# Patient Record
Sex: FEMALE | URBAN - METROPOLITAN AREA
[De-identification: names, ages, dates, MRNs, and addresses within clinical notes are randomized per-mention and may not be internally consistent; named-entity substitution may affect disease eponyms.]

---

## 2019-09-24 NOTE — H&P ADULT - NSICDXPASTSURGICALHX_GEN_ALL_CORE_FT
PAST SURGICAL HISTORY:  H/O carotid endarterectomy R side - Sptember 2018    History of cardiac cath 2013 ([facility])

## 2019-09-24 NOTE — H&P ADULT - NSHPSOCIALHISTORY_GEN_ALL_CORE
denies alcohol use  denies illicit drug use  Former 5-6 cigarette/day x 20 yr smoker. Quit 2 yrs ago.

## 2019-09-24 NOTE — H&P ADULT - NSHPPHYSICALEXAM_GEN_ALL_CORE
V/S 	BP:  168/92   HR: 83   RR: 16	T: 97.4	  O2: 97% RA   	  GEN: NAD  PULM:  CTA B/L  HEENT: No JVD  CARD: RRR, S1 and S2   ABD: +BS, NT, soft/ND	  EXT: No Edema B/L LE  NEURO: A+Ox3, no focal deficit  PSYCH: Mood appropriate   PULSES: 2+ Radial b/l, 2+ Femoral b/l (no bruit b/l), DP 2+ b/l, PT 2+ B/L  ASA III, Mallampati III  EKG NSR @ 78bpm, no ST changes, TWI in III V/S 	BP:  168/92   HR: 83   RR: 16	T: 97.4	  O2: 97% RA   	  GEN: NAD  PULM:  CTA B/L  HEENT: No JVD  CARD: RRR, S1 and S2   ABD: +BS, NT, soft/ND	  EXT: No Edema B/L LE  NEURO: A+Ox3, no focal deficit  PSYCH: Mood appropriate   PULSES: 2+ Radial b/l, 1+ Femoral b/l (no bruit b/l), DP 2+ b/l, PT 1+ B/L  ASA III, Mallampati III  EKG NSR @ 78bpm, no ST changes, TWI in III

## 2019-09-24 NOTE — H&P ADULT - NSICDXFAMILYHX_GEN_ALL_CORE_FT
FAMILY HISTORY:  Family history of early CAD, Brother s/p PCI at age 43  Sister s/p PCI at age 53 + 58

## 2019-09-24 NOTE — H&P ADULT - ASSESSMENT
58 y/o Greek Speaking F former smoker, w/ PMHX HTN, HLD, DM, history of diagnostic cardiac catheterization in 2013, Carotid Artery Disease s/p R Sided Carotid Endarterectomy, w/ (CCS Angina Equivalent Class 3 Sx) and abnormal NST.    Due to patient's risk factors, strong family history, abnormal stress test, and CCS angina class 3 equivalent symptoms, patient was referred for cardiac catheterization with PCI if indicated.      H/H 12/40. Pt denies bleeding, GI bleeding, hematemesis, hematuria, BRBPR or melena.   ASA 325mg and Plavix 600mg pre-cath.  IV NS@ 75cc/hr pre-cath.  Type of Sedation: Moderate  Candidate for Sedation: Yes  Risks & benefits of procedure and alternative therapy have been explained to the patient including but not limited to: allergic reaction, bleeding w/possible need for blood transfusion, infection, renal and vascular compromise, limb damage, arrhythmia, stroke, vessel dissection/perforation, Myocardial infarction, emergent CABG. Informed consent obtained and in chart

## 2019-09-24 NOTE — H&P ADULT - NSHPLABSRESULTS_GEN_ALL_CORE
12.3   7.65  )-----------( 290      ( 25 Sep 2019 11:22 )             40.9       09-25    140  |  100  |  13  ----------------------------<  219<H>  3.8   |  29  |  0.62  K repleted w/ Potassium 20meq PO    Ca    9.4      25 Sep 2019 11:22    TPro  7.3  /  Alb  4.3  /  TBili  0.2  /  DBili  x   /  AST  12  /  ALT  11  /  AlkPhos  57  09-25      PT/INR - ( 25 Sep 2019 11:22 )   PT: 11.1 sec;   INR: 0.98          PTT - ( 25 Sep 2019 11:22 )  PTT:29.3 sec    CARDIAC MARKERS ( 25 Sep 2019 11:22 )  x     / x     / 38 U/L / x     / x

## 2019-09-24 NOTE — H&P ADULT - HISTORY OF PRESENT ILLNESS
58 yo former 5-6 cigarette x 20yrs (quit 2yrs ago) F with strong FH of CAD and PMH of HTN, HLD, DM, history of diagnostic cardiac catheterization @ [facility] in 2013 presented to PCP with complaints of SOB 6 months following R sided carotid endarterectomy. SOB occurs after 1/2 flight of stairs and does not occur at rest. Patient additionally admits to occasional dizziness. Patient denied CP, diaphoresis, LE edema, orthopnea, palpitations, PND, fatigue, N/V or syncope. Patient was referred to Dr. Palomo for further cardiac evaluation. Per MD note, echo showed normal LVEF with no significant valvular abnormality. NST was done on 8/30/19 which showed a medium size completely reversible perfusion defect of moderate intensity involving the mid-apical entire septum (anterior and posterior) and the inferior myocardial walls suggestive of inducible myocardial ischemia in the posterior and LAD coronary circulation distributions.    In light of patient's risk factors, strong family history, abnormal stress test, and CCS anginal equivalent syndromes, patient was referred for cardiac catheterization with PCI if indicated. History obtained from patient's daughter (Robb)  **Patient will bring in list of current medications tomorrow    58 yo former 5-6 cigarette x 20yrs (quit 2yrs ago) F with strong FH of CAD and PMH of HTN, HLD, DM, history of diagnostic cardiac catheterization @ [facility] in 2013 presented to PCP with complaints of SOB 6 months following R sided carotid endarterectomy. SOB occurs after 1/2 flight of stairs and does not occur at rest. Patient additionally admits to occasional dizziness. Patient denied CP, diaphoresis, LE edema, orthopnea, palpitations, PND, fatigue, N/V or syncope. Patient was referred to Dr. Palomo for further cardiac evaluation. Per MD note, echo showed normal LVEF with no significant valvular abnormality. NST was done on 8/30/19 which showed a medium size completely reversible perfusion defect of moderate intensity involving the mid-apical entire septum (anterior and posterior) and the inferior myocardial walls suggestive of inducible myocardial ischemia in the posterior and LAD coronary circulation distributions.    In light of patient's risk factors, strong family history, abnormal stress test, and CCS anginal equivalent syndromes, patient was referred for cardiac catheterization with PCI if indicated. History obtained from patient's daughter (Robb)  **Patient will bring in list of current medications tomorrow    60 yo former 5-6 cigarette x 20yrs (quit 2yrs ago) F with strong FH of CAD and PMH of HTN, HLD, DM, history of diagnostic cardiac catheterization @ [facility] in 2013 presented to PCP with complaints of SOB 6 months following R sided carotid endarterectomy. SOB occurs after 1/2 flight of stairs and does not occur at rest. Patient additionally admits to occasional dizziness. Patient denied CP, diaphoresis, LE edema, orthopnea, palpitations, PND, fatigue, N/V or syncope. Patient was referred to Dr. Palomo for further cardiac evaluation. Per MD note, echo showed normal LVEF with no significant valvular abnormality. NST was done on 8/30/19 which showed a medium size completely reversible perfusion defect of moderate intensity involving the mid-apical entire septum (anterior and posterior) and the inferior myocardial walls suggestive of inducible myocardial ischemia in the posterior and LAD coronary circulation distributions.    In light of patient's risk factors, strong family history, abnormal stress test, and CCS anginal equivalent symptoms, patient was referred for cardiac catheterization with PCI if indicated. History obtained from patient's daughter (Robb)    58 y/o Uzbek Speaking F former 5-6 cigarette x 20yrs (quit 2yrs ago) F with strong FMHX of CAD and PMH of HTN, HLD, DM, history of diagnostic cardiac catheterization @ [facility] in 2013, Carotid Artery Disease s/p R Sided Carotid Endarterectomy, who presents w/ STARKEY after 1/2 flight of stairs and does not occur at rest (CCS Angina Equivalent Class 3 Sx). Patient additionally admits to occasional dizziness. Patient denied CP, diaphoresis, LE edema, orthopnea, palpitations, PND, fatigue, N/V or syncope. Patient was referred to Dr. Palomo for further cardiac evaluation. Per MD note, echo showed normal LVEF with no significant valvular abnormality. NST in 8/2019 showed a medium size completely reversible perfusion defect of moderate intensity involving the mid-apical entire septum (anterior and posterior) and the inferior myocardial walls suggestive of inducible myocardial ischemia in the posterior and LAD coronary circulation distributions.    Due to patient's risk factors, strong family history, abnormal stress test, and CCS angina class 3 equivalent symptoms, patient was referred for cardiac catheterization with PCI if indicated.

## 2019-09-25 ENCOUNTER — INPATIENT (INPATIENT)
Facility: HOSPITAL | Age: 59
LOS: 7 days | Discharge: HOME | DRG: 232 | End: 2019-10-03
Attending: THORACIC SURGERY (CARDIOTHORACIC VASCULAR SURGERY) | Admitting: THORACIC SURGERY (CARDIOTHORACIC VASCULAR SURGERY)
Payer: COMMERCIAL

## 2019-09-25 DIAGNOSIS — Z98.890 OTHER SPECIFIED POSTPROCEDURAL STATES: Chronic | ICD-10-CM

## 2019-09-25 LAB
ALBUMIN SERPL ELPH-MCNC: 4.3 G/DL — SIGNIFICANT CHANGE UP (ref 3.3–5)
ALP SERPL-CCNC: 57 U/L — SIGNIFICANT CHANGE UP (ref 40–120)
ALT FLD-CCNC: 11 U/L — SIGNIFICANT CHANGE UP (ref 10–45)
ANION GAP SERPL CALC-SCNC: 11 MMOL/L — SIGNIFICANT CHANGE UP (ref 5–17)
APTT BLD: 29.3 SEC — SIGNIFICANT CHANGE UP (ref 27.5–36.3)
AST SERPL-CCNC: 12 U/L — SIGNIFICANT CHANGE UP (ref 10–40)
BASOPHILS # BLD AUTO: 0.04 K/UL — SIGNIFICANT CHANGE UP (ref 0–0.2)
BASOPHILS NFR BLD AUTO: 0.5 % — SIGNIFICANT CHANGE UP (ref 0–2)
BILIRUB SERPL-MCNC: 0.2 MG/DL — SIGNIFICANT CHANGE UP (ref 0.2–1.2)
BUN SERPL-MCNC: 13 MG/DL — SIGNIFICANT CHANGE UP (ref 7–23)
CALCIUM SERPL-MCNC: 9.4 MG/DL — SIGNIFICANT CHANGE UP (ref 8.4–10.5)
CHLORIDE SERPL-SCNC: 100 MMOL/L — SIGNIFICANT CHANGE UP (ref 96–108)
CHOLEST SERPL-MCNC: 142 MG/DL — SIGNIFICANT CHANGE UP (ref 10–199)
CK SERPL-CCNC: 38 U/L — SIGNIFICANT CHANGE UP (ref 25–170)
CO2 SERPL-SCNC: 29 MMOL/L — SIGNIFICANT CHANGE UP (ref 22–31)
CREAT SERPL-MCNC: 0.62 MG/DL — SIGNIFICANT CHANGE UP (ref 0.5–1.3)
CRP SERPL-MCNC: 0.56 MG/DL — HIGH (ref 0–0.4)
EOSINOPHIL # BLD AUTO: 0.09 K/UL — SIGNIFICANT CHANGE UP (ref 0–0.5)
EOSINOPHIL NFR BLD AUTO: 1.2 % — SIGNIFICANT CHANGE UP (ref 0–6)
GLUCOSE SERPL-MCNC: 219 MG/DL — HIGH (ref 70–99)
HBA1C BLD-MCNC: 9.1 % — HIGH (ref 4–5.6)
HCT VFR BLD CALC: 40.9 % — SIGNIFICANT CHANGE UP (ref 34.5–45)
HDLC SERPL-MCNC: 37 MG/DL — LOW
HGB BLD-MCNC: 12.3 G/DL — SIGNIFICANT CHANGE UP (ref 11.5–15.5)
IMM GRANULOCYTES NFR BLD AUTO: 0.8 % — SIGNIFICANT CHANGE UP (ref 0–1.5)
INR BLD: 0.98 — SIGNIFICANT CHANGE UP (ref 0.88–1.16)
LIPID PNL WITH DIRECT LDL SERPL: 68 MG/DL — SIGNIFICANT CHANGE UP
LYMPHOCYTES # BLD AUTO: 2.15 K/UL — SIGNIFICANT CHANGE UP (ref 1–3.3)
LYMPHOCYTES # BLD AUTO: 28.1 % — SIGNIFICANT CHANGE UP (ref 13–44)
MCHC RBC-ENTMCNC: 22.9 PG — LOW (ref 27–34)
MCHC RBC-ENTMCNC: 30.1 GM/DL — LOW (ref 32–36)
MCV RBC AUTO: 76 FL — LOW (ref 80–100)
MONOCYTES # BLD AUTO: 0.46 K/UL — SIGNIFICANT CHANGE UP (ref 0–0.9)
MONOCYTES NFR BLD AUTO: 6 % — SIGNIFICANT CHANGE UP (ref 2–14)
NEUTROPHILS # BLD AUTO: 4.85 K/UL — SIGNIFICANT CHANGE UP (ref 1.8–7.4)
NEUTROPHILS NFR BLD AUTO: 63.4 % — SIGNIFICANT CHANGE UP (ref 43–77)
NRBC # BLD: 0 /100 WBCS — SIGNIFICANT CHANGE UP (ref 0–0)
PLATELET # BLD AUTO: 290 K/UL — SIGNIFICANT CHANGE UP (ref 150–400)
POTASSIUM SERPL-MCNC: 3.8 MMOL/L — SIGNIFICANT CHANGE UP (ref 3.5–5.3)
POTASSIUM SERPL-SCNC: 3.8 MMOL/L — SIGNIFICANT CHANGE UP (ref 3.5–5.3)
PROT SERPL-MCNC: 7.3 G/DL — SIGNIFICANT CHANGE UP (ref 6–8.3)
PROTHROM AB SERPL-ACNC: 11.1 SEC — SIGNIFICANT CHANGE UP (ref 10–12.9)
RBC # BLD: 5.38 M/UL — HIGH (ref 3.8–5.2)
RBC # FLD: 13.7 % — SIGNIFICANT CHANGE UP (ref 10.3–14.5)
SODIUM SERPL-SCNC: 140 MMOL/L — SIGNIFICANT CHANGE UP (ref 135–145)
TOTAL CHOLESTEROL/HDL RATIO MEASUREMENT: 3.8 RATIO — SIGNIFICANT CHANGE UP (ref 3.3–7.1)
TRIGL SERPL-MCNC: 185 MG/DL — HIGH (ref 10–149)
WBC # BLD: 7.65 K/UL — SIGNIFICANT CHANGE UP (ref 3.8–10.5)
WBC # FLD AUTO: 7.65 K/UL — SIGNIFICANT CHANGE UP (ref 3.8–10.5)

## 2019-09-25 PROCEDURE — 93010 ELECTROCARDIOGRAM REPORT: CPT

## 2019-09-25 PROCEDURE — 99223 1ST HOSP IP/OBS HIGH 75: CPT | Mod: 57

## 2019-09-25 PROCEDURE — 93880 EXTRACRANIAL BILAT STUDY: CPT | Mod: 26

## 2019-09-25 PROCEDURE — 71045 X-RAY EXAM CHEST 1 VIEW: CPT | Mod: 26

## 2019-09-25 RX ORDER — DEXTROSE 50 % IN WATER 50 %
15 SYRINGE (ML) INTRAVENOUS ONCE
Refills: 0 | Status: DISCONTINUED | OUTPATIENT
Start: 2019-09-25 | End: 2019-09-26

## 2019-09-25 RX ORDER — MECLIZINE HCL 12.5 MG
25 TABLET ORAL EVERY 12 HOURS
Refills: 0 | Status: DISCONTINUED | OUTPATIENT
Start: 2019-09-25 | End: 2019-10-03

## 2019-09-25 RX ORDER — INSULIN LISPRO 100/ML
VIAL (ML) SUBCUTANEOUS ONCE
Refills: 0 | Status: COMPLETED | OUTPATIENT
Start: 2019-09-25 | End: 2019-09-26

## 2019-09-25 RX ORDER — SODIUM CHLORIDE 9 MG/ML
1000 INJECTION, SOLUTION INTRAVENOUS
Refills: 0 | Status: DISCONTINUED | OUTPATIENT
Start: 2019-09-25 | End: 2019-09-27

## 2019-09-25 RX ORDER — SODIUM CHLORIDE 9 MG/ML
3 INJECTION INTRAMUSCULAR; INTRAVENOUS; SUBCUTANEOUS EVERY 8 HOURS
Refills: 0 | Status: DISCONTINUED | OUTPATIENT
Start: 2019-09-25 | End: 2019-10-03

## 2019-09-25 RX ORDER — INSULIN LISPRO 100/ML
VIAL (ML) SUBCUTANEOUS
Refills: 0 | Status: DISCONTINUED | OUTPATIENT
Start: 2019-09-25 | End: 2019-09-25

## 2019-09-25 RX ORDER — INSULIN LISPRO 100/ML
VIAL (ML) SUBCUTANEOUS ONCE
Refills: 0 | Status: COMPLETED | OUTPATIENT
Start: 2019-09-25 | End: 2019-09-25

## 2019-09-25 RX ORDER — DEXTROSE 50 % IN WATER 50 %
12.5 SYRINGE (ML) INTRAVENOUS ONCE
Refills: 0 | Status: DISCONTINUED | OUTPATIENT
Start: 2019-09-25 | End: 2019-09-26

## 2019-09-25 RX ORDER — GLUCAGON INJECTION, SOLUTION 0.5 MG/.1ML
1 INJECTION, SOLUTION SUBCUTANEOUS ONCE
Refills: 0 | Status: DISCONTINUED | OUTPATIENT
Start: 2019-09-25 | End: 2019-10-03

## 2019-09-25 RX ORDER — ASPIRIN/CALCIUM CARB/MAGNESIUM 324 MG
325 TABLET ORAL ONCE
Refills: 0 | Status: COMPLETED | OUTPATIENT
Start: 2019-09-25 | End: 2019-09-25

## 2019-09-25 RX ORDER — ASPIRIN/CALCIUM CARB/MAGNESIUM 324 MG
81 TABLET ORAL DAILY
Refills: 0 | Status: DISCONTINUED | OUTPATIENT
Start: 2019-09-25 | End: 2019-10-03

## 2019-09-25 RX ORDER — DEXTROSE 50 % IN WATER 50 %
25 SYRINGE (ML) INTRAVENOUS ONCE
Refills: 0 | Status: DISCONTINUED | OUTPATIENT
Start: 2019-09-25 | End: 2019-09-26

## 2019-09-25 RX ORDER — METOPROLOL TARTRATE 50 MG
12.5 TABLET ORAL EVERY 12 HOURS
Refills: 0 | Status: DISCONTINUED | OUTPATIENT
Start: 2019-09-25 | End: 2019-09-27

## 2019-09-25 RX ORDER — SIMVASTATIN 20 MG/1
20 TABLET, FILM COATED ORAL AT BEDTIME
Refills: 0 | Status: DISCONTINUED | OUTPATIENT
Start: 2019-09-25 | End: 2019-10-03

## 2019-09-25 RX ORDER — POTASSIUM CHLORIDE 20 MEQ
20 PACKET (EA) ORAL ONCE
Refills: 0 | Status: COMPLETED | OUTPATIENT
Start: 2019-09-25 | End: 2019-09-25

## 2019-09-25 RX ORDER — ESCITALOPRAM OXALATE 10 MG/1
5 TABLET, FILM COATED ORAL DAILY
Refills: 0 | Status: DISCONTINUED | OUTPATIENT
Start: 2019-09-25 | End: 2019-10-03

## 2019-09-25 RX ORDER — HEPARIN SODIUM 5000 [USP'U]/ML
5000 INJECTION INTRAVENOUS; SUBCUTANEOUS EVERY 8 HOURS
Refills: 0 | Status: DISCONTINUED | OUTPATIENT
Start: 2019-09-25 | End: 2019-10-03

## 2019-09-25 RX ORDER — CLOPIDOGREL BISULFATE 75 MG/1
600 TABLET, FILM COATED ORAL ONCE
Refills: 0 | Status: COMPLETED | OUTPATIENT
Start: 2019-09-25 | End: 2019-09-25

## 2019-09-25 RX ADMIN — Medication 325 MILLIGRAM(S): at 12:11

## 2019-09-25 RX ADMIN — Medication 20 MILLIEQUIVALENT(S): at 12:29

## 2019-09-25 RX ADMIN — Medication 4: at 12:12

## 2019-09-25 RX ADMIN — CLOPIDOGREL BISULFATE 600 MILLIGRAM(S): 75 TABLET, FILM COATED ORAL at 12:12

## 2019-09-25 NOTE — CONSULT NOTE ADULT - SUBJECTIVE AND OBJECTIVE BOX
Surgeon: Dr. Arenas    Requesting Physician: Dr. Felix    HISTORY OF PRESENT ILLNESS:  60 y/o Czech Speaking F former 5-6 cigarette x 20yrs (quit 2yrs ago) with strong FMHX of CAD and PMH of HTN, HLD, DM, history of diagnostic cardiac catheterization @ [facility] in 2013, Carotid Artery Disease s/p R Sided Carotid Endarterectomy, who presents w/ STARKEY after 1/2 flight of stairs and does not occur at rest (CCS Angina Equivalent Class 3 Sx). Patient additionally admits to occasional dizziness. Patient denied CP, diaphoresis, LE edema, orthopnea, palpitations, PND, fatigue, N/V or syncope. Patient was referred to Dr. Palomo for further cardiac evaluation. Per MD note, echo showed normal LVEF with no significant valvular abnormality. N ST in 8/2019 showed a medium size completely reversible perfusion defect of moderate intensity involving the mid-apical entire septum (anterior and posterior) and the inferior myocardial walls suggestive of inducible myocardial ischemia in the posterior and LAD coronary circulation distributions.    Today in cath lab..............    PAST MEDICAL & SURGICAL HISTORY:  Diabetes mellitus  Hyperlipemia  Hypertension  History of cardiac cath: 2013 ([facility])  H/O carotid endarterectomy: R side - Sptember 2018    MEDICATIONS  (STANDING):  dextrose 5%. 1000 milliLiter(s) (50 mL/Hr) IV Continuous <Continuous>  dextrose 50% Injectable 12.5 Gram(s) IV Push Once  dextrose 50% Injectable 25 Gram(s) IV Push Once  dextrose 50% Injectable 25 Gram(s) IV Push Once  heparin  Injectable 5000 Unit(s) SubCutaneous every 8 hours  insulin lispro (HumaLOG) corrective regimen sliding scale   SubCutaneous Once  sodium chloride 0.9% lock flush 3 milliLiter(s) IV Push every 8 hours    MEDICATIONS  (PRN):  dextrose 40% Gel 15 Gram(s) Oral Once PRN Blood Glucose LESS THAN 70 milliGRAM(s)/deciliter  glucagon  Injectable 1 milliGRAM(s) IntraMuscular Once PRN Glucose LESS THAN 70 milligrams/deciliter    Allergies: No Known Allergies    SOCIAL HISTORY:  Smoker:  YES        PACK YEARS:  5-6 cigarette x 20yrs                     WHEN QUIT: 2 years ago  ETOH use:  NO              Illicit Drug use:  NO  Occupation:  Assisted device use (Cane / Walker):  Live with:    FAMILY HISTORY:  Family history of early CAD: Brother s/p PCI at age 43  Sister s/p PCI at age 53 + 58    Review of Systems (Need 10):  CONSTITUTIONAL: Denies fevers / chills, sweats, fatigue, weight loss, weight gain                                       NEURO: +dizziness Denies parathesias, seizures, syncope, confusion                                                                                  EYES:  Denies blurry vision, discharge, pain, loss of vision                                                                                    ENMT:  Denies difficulty hearing, vertigo, dysphagia, epistaxis, recent dental work                                       CV:  + STARKEY with 1/2 flight stairs Denies chest pain, palpitations, orthopnea                                                                                           RESPIRATORY:  Denies wheezing, SOB, cough / sputum, hemoptysis                                                               GI:  Denies nausea, vomiting, diarrhea, constipation, melena                                                                          : Denies hematuria, dysuria, urgency, incontinence                                                                                          MUSKULOSKELETAL:  Denies arthritis, joint swelling, muscle weakness                                                             SKIN/BREAST:  Denies rash, itching, hair loss, masses                                                                                              PSYCH:  Denies depression, anxiety, suicidal ideation                                                                                                HEME/LYMPH:  Denies bruises easily, enlarged lymph nodes, tender lymph nodes                                          ENDOCRINE:  Denies cold intolerance, heat intolerance, polydipsia                                                                      Vital Signs Last 24 Hrs:      Physical Exam  CONSTITUTIONAL:                                                                          WNL  NEURO:                                                                                             WNL                      EYES:                                                                                                  WNL  ENMT:                                                                                                WNL  CV:                                                                                                      WNL  RESPIRATORY:                                                                                  WNL  GI:                                                                                                       WNL  : MESSINA + / -                                                                                 WNL  MUSKULOSKELETAL:                                                                       WNL  SKIN / BREAST:                                                                                 WNL                                                          LABS:             12.3   7.65  )-----------( 290      ( 25 Sep 2019 11:22 )             40.9     09-25  140  |  100  |  13  ----------------------------<  219<H>  3.8   |  29  |  0.62    Ca    9.4      25 Sep 2019 11:22    TPro  7.3  /  Alb  4.3  /  TBili  0.2  /  DBili  x   /  AST  12  /  ALT  11  /  AlkPhos  57  09-25    PT/INR - ( 25 Sep 2019 11:22 )   PT: 11.1 sec;   INR: 0.98     PTT - ( 25 Sep 2019 11:22 )  PTT:29.3 sec    CARDIAC MARKERS ( 25 Sep 2019 11:22 )  x     / x     / 38 U/L / x     / x        RADIOLOGY & ADDITIONAL STUDIES:  CAROTID U/S: pending    CXR: pending    CT Scan:    EKG:     TTE / GERMAN:    Cardiac Cath: Surgeon: Dr. Arenas    Requesting Physician: Dr. Fleix    HISTORY OF PRESENT ILLNESS:  58 y/o Hebrew Speaking F former 5-6 cigarette x 20yrs (quit 2yrs ago) with strong FMHX of CAD and PMH of HTN, HLD, DM, history of diagnostic cardiac catheterization @ [facility] in 2013, Carotid Artery Disease s/p R Sided Carotid Endarterectomy, who presents w/ STARKEY after 1/2 flight of stairs and does not occur at rest (CCS Angina Equivalent Class 3 Sx). Patient additionally admits to occasional dizziness. Patient denied CP, diaphoresis, LE edema, orthopnea, palpitations, PND, fatigue, N/V or syncope. Patient was referred to Dr. Palomo for further cardiac evaluation. Per MD note, echo showed normal LVEF with no significant valvular abnormality. N ST in 8/2019 showed a medium size completely reversible perfusion defect of moderate intensity involving the mid-apical entire septum (anterior and posterior) and the inferior myocardial walls suggestive of inducible myocardial ischemia in the posterior and LAD coronary circulation distributions. Cath today revealed LAD- 100%  proximal with R-L and L-L collaterals, pD1-  90% stenosis, LCX- mild disease, pOM1- 80% stenosis, pOM2- 80% stenosis, RCA- Dominant with mild luminal irregularities, LM normal. LVEF normal 60%, LVEDP normal, no AS/MR    PAST MEDICAL & SURGICAL HISTORY:  Diabetes mellitus  Hyperlipemia  Hypertension  History of cardiac cath: 2013 ([facility])  H/O carotid endarterectomy: R side - Sptember 2018    MEDICATIONS  (STANDING):  dextrose 5%. 1000 milliLiter(s) (50 mL/Hr) IV Continuous <Continuous>  dextrose 50% Injectable 12.5 Gram(s) IV Push Once  dextrose 50% Injectable 25 Gram(s) IV Push Once  dextrose 50% Injectable 25 Gram(s) IV Push Once  heparin  Injectable 5000 Unit(s) SubCutaneous every 8 hours  insulin lispro (HumaLOG) corrective regimen sliding scale   SubCutaneous Once  sodium chloride 0.9% lock flush 3 milliLiter(s) IV Push every 8 hours    MEDICATIONS  (PRN):  dextrose 40% Gel 15 Gram(s) Oral Once PRN Blood Glucose LESS THAN 70 milliGRAM(s)/deciliter  glucagon  Injectable 1 milliGRAM(s) IntraMuscular Once PRN Glucose LESS THAN 70 milligrams/deciliter    Allergies: No Known Allergies    SOCIAL HISTORY:  Smoker:  YES        PACK YEARS:  5-6 cigarette x 20yrs                     WHEN QUIT: 2 years ago  ETOH use:  NO              Illicit Drug use:  NO  Occupation:  Assisted device use (Cane / Walker): walker after R CEA  Live with: children    FAMILY HISTORY:  Family history of early CAD: Brother s/p PCI at age 43  Sister s/p PCI at age 53 + 58    Review of Systems (Need 10):  CONSTITUTIONAL: Denies fevers / chills, sweats, fatigue, weight loss, weight gain                                       NEURO: +dizziness Denies parathesias, seizures, syncope, confusion                                                                                  EYES:  Denies blurry vision, discharge, pain, loss of vision                                                                                    ENMT:  Denies difficulty hearing, vertigo, dysphagia, epistaxis, recent dental work                                       CV:  + STARKEY with 1/2 flight stairs Denies chest pain, palpitations, orthopnea                                                                                           RESPIRATORY:  Denies wheezing, SOB, cough / sputum, hemoptysis                                                               GI:  Denies nausea, vomiting, diarrhea, constipation, melena                                                                          : Denies hematuria, dysuria, urgency, incontinence                                                                                          MUSKULOSKELETAL:  Denies arthritis, joint swelling, muscle weakness                                                             SKIN/BREAST:  Denies rash, itching, hair loss, masses                                                                                              PSYCH:  Denies depression, anxiety, suicidal ideation                                                                                                HEME/LYMPH:  Denies bruises easily, enlarged lymph nodes, tender lymph nodes                                          ENDOCRINE:  Denies cold intolerance, heat intolerance, polydipsia                                                                      Vital Signs Last 24 Hrs:      Physical Exam  CONSTITUTIONAL: NAD, stable  NEURO: A&O x3, no focal deficits                  EYES: EOMI, PERRLA  ENMT: normocephalic, atraumatic  CV: RRR, normal S1, S2  RESPIRATORY:  CTA b/l  GI: +BS, soft, nontender  : no fierro  MUSKULOSKELETAL: FROm b/l, no joint swelling  SKIN / BREAST: no lacerations/ abrasions                                                          LABS:             12.3   7.65  )-----------( 290      ( 25 Sep 2019 11:22 )             40.9     09-25  140  |  100  |  13  ----------------------------<  219<H>  3.8   |  29  |  0.62    Ca    9.4      25 Sep 2019 11:22    TPro  7.3  /  Alb  4.3  /  TBili  0.2  /  DBili  x   /  AST  12  /  ALT  11  /  AlkPhos  57  09-25    PT/INR - ( 25 Sep 2019 11:22 )   PT: 11.1 sec;   INR: 0.98     PTT - ( 25 Sep 2019 11:22 )  PTT:29.3 sec    CARDIAC MARKERS ( 25 Sep 2019 11:22 )  x     / x     / 38 U/L / x     / x        RADIOLOGY & ADDITIONAL STUDIES:  CAROTID U/S: pending    CXR: pending    CT Scan:    EKG:     TTE / GERMAN:    Cardiac Cath:

## 2019-09-25 NOTE — CONSULT NOTE ADULT - ASSESSMENT
58 y/o Occitan Speaking F former 5-6 cigarette x 20yrs (quit 2yrs ago) with strong FMHX of CAD and PMH of HTN, HLD, DM, history of diagnostic cardiac catheterization @ [facility] in 2013, Carotid Artery Disease s/p R Sided Carotid Endarterectomy, who presents w/ STARKEY after 1/2 flight of stairs and does not occur at rest (CCS Angina Equivalent Class 3 Sx). Patient additionally admits to occasional dizziness. Patient denied CP, diaphoresis, LE edema, orthopnea, palpitations, PND, fatigue, N/V or syncope. Patient was referred to Dr. Palomo for further cardiac evaluation. Per MD note, echo showed normal LVEF with no significant valvular abnormality. N ST in 8/2019 showed a medium size completely reversible perfusion defect of moderate intensity involving the mid-apical entire septum (anterior and posterior) and the inferior myocardial walls suggestive of inducible myocardial ischemia in the posterior and LAD coronary circulation distributions.    Plan:  Problem 1: CAD  Admit to CT surgery  Pre Op work up to be done: carotids, echo, PFTs, type and screen x2, ekg, CXR,  Cath reveals:      Problem 2: HTN  Continue BB, d/c enalipril  Continue statin  Continue ASA    Problem 3: DM  Insulin SS ordered  f/u Hg A1C  Consider endo consult pending HA1C results    Problem 4: PVD  s/p r CEA: obtain carotid US  f/u results    I have reviewed clinical labs tests and reports, radiology tests and reports, as well as old patient medical records, and discussed with the refering physician 60 y/o Divehi Speaking F former 5-6 cigarette x 20yrs (quit 2yrs ago) with strong FMHX of CAD and PMH of HTN, HLD, DM, history of diagnostic cardiac catheterization @ [facility] in 2013, Carotid Artery Disease s/p R Sided Carotid Endarterectomy, who presents w/ STARKEY after 1/2 flight of stairs and does not occur at rest (CCS Angina Equivalent Class 3 Sx). Patient additionally admits to occasional dizziness. Patient denied CP, diaphoresis, LE edema, orthopnea, palpitations, PND, fatigue, N/V or syncope. Patient was referred to Dr. Palomo for further cardiac evaluation. Per MD note, echo showed normal LVEF with no significant valvular abnormality. N ST in 8/2019 showed a medium size completely reversible perfusion defect of moderate intensity involving the mid-apical entire septum (anterior and posterior) and the inferior myocardial walls suggestive of inducible myocardial ischemia in the posterior and LAD coronary circulation distributions.    Plan:  Problem 1: CAD  Admit to CT surgery  Pre Op work up to be done: carotids, echo, PFTs, type and screen x2, ekg, CXR,  cath with 3vcad, plan fo midcab with PCI post op hybrid procedure    Problem 2: HTN  Continue BB, d/c enalipril  Continue statin  Continue ASA    Problem 3: DM  Insulin SS ordered  f/u Hg A1C  Consider endo consult pending HA1C results    Problem 4: PVD  s/p r CEA: obtain carotid US  f/u results    I have reviewed clinical labs tests and reports, radiology tests and reports, as well as old patient medical records, and discussed with the refering physician

## 2019-09-26 VITALS
DIASTOLIC BLOOD PRESSURE: 79 MMHG | OXYGEN SATURATION: 93 % | RESPIRATION RATE: 16 BRPM | SYSTOLIC BLOOD PRESSURE: 155 MMHG | HEART RATE: 84 BPM

## 2019-09-26 PROBLEM — I10 ESSENTIAL (PRIMARY) HYPERTENSION: Chronic | Status: ACTIVE | Noted: 2019-09-24

## 2019-09-26 PROBLEM — E78.5 HYPERLIPIDEMIA, UNSPECIFIED: Chronic | Status: ACTIVE | Noted: 2019-09-24

## 2019-09-26 PROBLEM — Z00.00 ENCOUNTER FOR PREVENTIVE HEALTH EXAMINATION: Status: ACTIVE | Noted: 2019-09-26

## 2019-09-26 PROBLEM — E11.9 TYPE 2 DIABETES MELLITUS WITHOUT COMPLICATIONS: Chronic | Status: ACTIVE | Noted: 2019-09-24

## 2019-09-26 LAB
ALBUMIN SERPL ELPH-MCNC: 3.7 G/DL — SIGNIFICANT CHANGE UP (ref 3.3–5)
ALP SERPL-CCNC: 55 U/L — SIGNIFICANT CHANGE UP (ref 40–120)
ALT FLD-CCNC: 10 U/L — SIGNIFICANT CHANGE UP (ref 10–45)
ANION GAP SERPL CALC-SCNC: 12 MMOL/L — SIGNIFICANT CHANGE UP (ref 5–17)
APTT BLD: 28.4 SEC — SIGNIFICANT CHANGE UP (ref 27.5–36.3)
AST SERPL-CCNC: 11 U/L — SIGNIFICANT CHANGE UP (ref 10–40)
BASOPHILS # BLD AUTO: 0.04 K/UL — SIGNIFICANT CHANGE UP (ref 0–0.2)
BASOPHILS NFR BLD AUTO: 0.5 % — SIGNIFICANT CHANGE UP (ref 0–2)
BILIRUB SERPL-MCNC: 0.2 MG/DL — SIGNIFICANT CHANGE UP (ref 0.2–1.2)
BLD GP AB SCN SERPL QL: NEGATIVE — SIGNIFICANT CHANGE UP
BLD GP AB SCN SERPL QL: NEGATIVE — SIGNIFICANT CHANGE UP
BUN SERPL-MCNC: 14 MG/DL — SIGNIFICANT CHANGE UP (ref 7–23)
CALCIUM SERPL-MCNC: 9 MG/DL — SIGNIFICANT CHANGE UP (ref 8.4–10.5)
CHLORIDE SERPL-SCNC: 100 MMOL/L — SIGNIFICANT CHANGE UP (ref 96–108)
CO2 SERPL-SCNC: 27 MMOL/L — SIGNIFICANT CHANGE UP (ref 22–31)
CREAT SERPL-MCNC: 0.6 MG/DL — SIGNIFICANT CHANGE UP (ref 0.5–1.3)
EOSINOPHIL # BLD AUTO: 0.11 K/UL — SIGNIFICANT CHANGE UP (ref 0–0.5)
EOSINOPHIL NFR BLD AUTO: 1.3 % — SIGNIFICANT CHANGE UP (ref 0–6)
ESTIMATED AVERAGE GLUCOSE: 217 MG/DL — HIGH (ref 68–114)
GLUCOSE BLDC GLUCOMTR-MCNC: 219 MG/DL — HIGH (ref 70–99)
GLUCOSE BLDC GLUCOMTR-MCNC: 221 MG/DL — HIGH (ref 70–99)
GLUCOSE BLDC GLUCOMTR-MCNC: 286 MG/DL — HIGH (ref 70–99)
GLUCOSE SERPL-MCNC: 249 MG/DL — HIGH (ref 70–99)
HBA1C BLD-MCNC: 9.2 % — HIGH (ref 4–5.6)
HBA1C BLD-MCNC: 9.2 % — HIGH (ref 4–5.6)
HCT VFR BLD CALC: 37.9 % — SIGNIFICANT CHANGE UP (ref 34.5–45)
HCV AB S/CO SERPL IA: 0.19 S/CO — SIGNIFICANT CHANGE UP
HCV AB SERPL-IMP: SIGNIFICANT CHANGE UP
HGB BLD-MCNC: 11.5 G/DL — SIGNIFICANT CHANGE UP (ref 11.5–15.5)
IMM GRANULOCYTES NFR BLD AUTO: 0.6 % — SIGNIFICANT CHANGE UP (ref 0–1.5)
INR BLD: 1.01 — SIGNIFICANT CHANGE UP (ref 0.88–1.16)
LYMPHOCYTES # BLD AUTO: 1.88 K/UL — SIGNIFICANT CHANGE UP (ref 1–3.3)
LYMPHOCYTES # BLD AUTO: 21.9 % — SIGNIFICANT CHANGE UP (ref 13–44)
MCHC RBC-ENTMCNC: 23.2 PG — LOW (ref 27–34)
MCHC RBC-ENTMCNC: 30.3 GM/DL — LOW (ref 32–36)
MCV RBC AUTO: 76.6 FL — LOW (ref 80–100)
MONOCYTES # BLD AUTO: 0.53 K/UL — SIGNIFICANT CHANGE UP (ref 0–0.9)
MONOCYTES NFR BLD AUTO: 6.2 % — SIGNIFICANT CHANGE UP (ref 2–14)
NEUTROPHILS # BLD AUTO: 5.97 K/UL — SIGNIFICANT CHANGE UP (ref 1.8–7.4)
NEUTROPHILS NFR BLD AUTO: 69.5 % — SIGNIFICANT CHANGE UP (ref 43–77)
NRBC # BLD: 0 /100 WBCS — SIGNIFICANT CHANGE UP (ref 0–0)
NT-PROBNP SERPL-SCNC: 158 PG/ML — SIGNIFICANT CHANGE UP (ref 0–300)
PLATELET # BLD AUTO: 295 K/UL — SIGNIFICANT CHANGE UP (ref 150–400)
POTASSIUM SERPL-MCNC: 4 MMOL/L — SIGNIFICANT CHANGE UP (ref 3.5–5.3)
POTASSIUM SERPL-SCNC: 4 MMOL/L — SIGNIFICANT CHANGE UP (ref 3.5–5.3)
PROT SERPL-MCNC: 6.7 G/DL — SIGNIFICANT CHANGE UP (ref 6–8.3)
PROTHROM AB SERPL-ACNC: 11.4 SEC — SIGNIFICANT CHANGE UP (ref 10–12.9)
RBC # BLD: 4.95 M/UL — SIGNIFICANT CHANGE UP (ref 3.8–5.2)
RBC # FLD: 13.6 % — SIGNIFICANT CHANGE UP (ref 10.3–14.5)
RH IG SCN BLD-IMP: POSITIVE — SIGNIFICANT CHANGE UP
RH IG SCN BLD-IMP: POSITIVE — SIGNIFICANT CHANGE UP
SODIUM SERPL-SCNC: 139 MMOL/L — SIGNIFICANT CHANGE UP (ref 135–145)
T4 FREE SERPL-MCNC: 1.09 NG/DL — SIGNIFICANT CHANGE UP (ref 0.7–1.48)
TSH SERPL-MCNC: 0.72 UIU/ML — SIGNIFICANT CHANGE UP (ref 0.35–4.94)
WBC # BLD: 8.58 K/UL — SIGNIFICANT CHANGE UP (ref 3.8–10.5)
WBC # FLD AUTO: 8.58 K/UL — SIGNIFICANT CHANGE UP (ref 3.8–10.5)

## 2019-09-26 PROCEDURE — 94010 BREATHING CAPACITY TEST: CPT | Mod: 26

## 2019-09-26 PROCEDURE — 93306 TTE W/DOPPLER COMPLETE: CPT | Mod: 26

## 2019-09-26 PROCEDURE — 99222 1ST HOSP IP/OBS MODERATE 55: CPT | Mod: GC

## 2019-09-26 RX ORDER — DEXTROSE 50 % IN WATER 50 %
25 SYRINGE (ML) INTRAVENOUS ONCE
Refills: 0 | Status: DISCONTINUED | OUTPATIENT
Start: 2019-09-26 | End: 2019-09-27

## 2019-09-26 RX ORDER — INSULIN GLARGINE 100 [IU]/ML
8 INJECTION, SOLUTION SUBCUTANEOUS AT BEDTIME
Refills: 0 | Status: DISCONTINUED | OUTPATIENT
Start: 2019-09-26 | End: 2019-09-27

## 2019-09-26 RX ORDER — INSULIN LISPRO 100/ML
VIAL (ML) SUBCUTANEOUS
Refills: 0 | Status: DISCONTINUED | OUTPATIENT
Start: 2019-09-26 | End: 2019-09-27

## 2019-09-26 RX ORDER — CHLORHEXIDINE GLUCONATE 213 G/1000ML
1 SOLUTION TOPICAL ONCE
Refills: 0 | Status: COMPLETED | OUTPATIENT
Start: 2019-09-27 | End: 2019-09-27

## 2019-09-26 RX ORDER — DEXTROSE 50 % IN WATER 50 %
15 SYRINGE (ML) INTRAVENOUS ONCE
Refills: 0 | Status: DISCONTINUED | OUTPATIENT
Start: 2019-09-26 | End: 2019-09-27

## 2019-09-26 RX ORDER — DEXTROSE 50 % IN WATER 50 %
12.5 SYRINGE (ML) INTRAVENOUS ONCE
Refills: 0 | Status: DISCONTINUED | OUTPATIENT
Start: 2019-09-26 | End: 2019-09-27

## 2019-09-26 RX ORDER — CHLORHEXIDINE GLUCONATE 213 G/1000ML
1 SOLUTION TOPICAL ONCE
Refills: 0 | Status: COMPLETED | OUTPATIENT
Start: 2019-09-26 | End: 2019-09-26

## 2019-09-26 RX ORDER — CHLORHEXIDINE GLUCONATE 213 G/1000ML
5 SOLUTION TOPICAL ONCE
Refills: 0 | Status: COMPLETED | OUTPATIENT
Start: 2019-09-26 | End: 2019-09-27

## 2019-09-26 RX ADMIN — Medication 12.5 MILLIGRAM(S): at 17:16

## 2019-09-26 RX ADMIN — Medication 12.5 MILLIGRAM(S): at 06:30

## 2019-09-26 RX ADMIN — Medication 25 MILLIGRAM(S): at 17:16

## 2019-09-26 RX ADMIN — CHLORHEXIDINE GLUCONATE 1 APPLICATION(S): 213 SOLUTION TOPICAL at 19:49

## 2019-09-26 RX ADMIN — HEPARIN SODIUM 5000 UNIT(S): 5000 INJECTION INTRAVENOUS; SUBCUTANEOUS at 22:40

## 2019-09-26 RX ADMIN — Medication 81 MILLIGRAM(S): at 11:56

## 2019-09-26 RX ADMIN — SIMVASTATIN 20 MILLIGRAM(S): 20 TABLET, FILM COATED ORAL at 00:30

## 2019-09-26 RX ADMIN — Medication 12.5 MILLIGRAM(S): at 00:31

## 2019-09-26 RX ADMIN — Medication 25 MILLIGRAM(S): at 00:50

## 2019-09-26 RX ADMIN — Medication 6: at 11:58

## 2019-09-26 RX ADMIN — SIMVASTATIN 20 MILLIGRAM(S): 20 TABLET, FILM COATED ORAL at 22:40

## 2019-09-26 RX ADMIN — SODIUM CHLORIDE 3 MILLILITER(S): 9 INJECTION INTRAMUSCULAR; INTRAVENOUS; SUBCUTANEOUS at 00:00

## 2019-09-26 RX ADMIN — ESCITALOPRAM OXALATE 5 MILLIGRAM(S): 10 TABLET, FILM COATED ORAL at 11:56

## 2019-09-26 RX ADMIN — HEPARIN SODIUM 5000 UNIT(S): 5000 INJECTION INTRAVENOUS; SUBCUTANEOUS at 15:04

## 2019-09-26 RX ADMIN — SODIUM CHLORIDE 3 MILLILITER(S): 9 INJECTION INTRAMUSCULAR; INTRAVENOUS; SUBCUTANEOUS at 22:38

## 2019-09-26 RX ADMIN — Medication 4: at 22:40

## 2019-09-26 RX ADMIN — INSULIN GLARGINE 8 UNIT(S): 100 INJECTION, SOLUTION SUBCUTANEOUS at 22:40

## 2019-09-26 RX ADMIN — Medication 4: at 17:16

## 2019-09-26 RX ADMIN — HEPARIN SODIUM 5000 UNIT(S): 5000 INJECTION INTRAVENOUS; SUBCUTANEOUS at 06:30

## 2019-09-26 RX ADMIN — HEPARIN SODIUM 5000 UNIT(S): 5000 INJECTION INTRAVENOUS; SUBCUTANEOUS at 00:31

## 2019-09-26 RX ADMIN — SODIUM CHLORIDE 3 MILLILITER(S): 9 INJECTION INTRAMUSCULAR; INTRAVENOUS; SUBCUTANEOUS at 14:50

## 2019-09-26 RX ADMIN — SODIUM CHLORIDE 3 MILLILITER(S): 9 INJECTION INTRAMUSCULAR; INTRAVENOUS; SUBCUTANEOUS at 06:30

## 2019-09-26 RX ADMIN — CHLORHEXIDINE GLUCONATE 1 APPLICATION(S): 213 SOLUTION TOPICAL at 22:41

## 2019-09-26 RX ADMIN — Medication 25 MILLIGRAM(S): at 06:30

## 2019-09-26 NOTE — PROGRESS NOTE ADULT - ASSESSMENT
60 y/o Japanese Speaking F former 5-6 cigarette x 20yrs (quit 2yrs ago) with strong FMHx of CAD and PMH of HTN, HLD, DM, history of diagnostic cardiac catheterization @ [facility] in 2013, Carotid Artery Disease s/p R Sided Carotid Endarterectomy, who presents w/ STARKEY after 1/2 flight of stairs and does not occur at rest (CCS Angina Equivalent Class 3 Sx). Patient additionally admits to occasional dizziness. Patient denied CP, diaphoresis, LE edema, orthopnea, palpitations, PND, fatigue, N/V or syncope. Patient was referred to Dr. Palomo for further cardiac evaluation. Per MD note, echo showed normal LVEF with no significant valvular abnormality. N ST in 8/2019 showed a medium size completely reversible perfusion defect of moderate intensity involving the mid-apical entire septum (anterior and posterior) and the inferior myocardial walls suggestive of inducible myocardial ischemia in the posterior and LAD coronary circulation distributions. Cath today revealed LAD- 100%  proximal with R-L and L-L collaterals, pD1-  90% stenosis, LCX- mild disease, pOM1- 80% stenosis, pOM2- 80% stenosis, RCA- Dominant with mild luminal irregularities, LM normal. LVEF normal 60%, LVEDP normal, no AS/MR. Pre-Op for MIDCAB tomorrow with future PCI completion  - NPO after MN  - Blood products ordered  - Consent in chart

## 2019-09-26 NOTE — CONSULT NOTE ADULT - ATTENDING COMMENTS
Pt seen on rounds this afternoon.  Glycemic control was poor as an outpatient despite maximal doses of three oral agents.  Although she likely needs insulin, there are sufficient dietary factors to modify (i.e. a decrease in starch at her main meal and a marked decrease in her fruit intake) that she might be controllable with orals.  For now, her fingersticks are in the 200 range on sliding scale only.  To start Lantus at 15 units (slightly below weight-based since she will be NPO for OR and also insulin-naive), and 5 units lispro pre-meal.  Will almost certainly need an insulin drip post-op
3vd, best is open cabg, however pt and family would prefer hybrid approach, risks and benefits explained to pt and family agree

## 2019-09-26 NOTE — PHYSICAL THERAPY INITIAL EVALUATION ADULT - PERTINENT HX OF CURRENT PROBLEM, REHAB EVAL
58 y/o French Speaking F former 5-6 cigarette x 20yrs (quit 2yrs ago) F with strong FMHX of CAD and PMH of HTN, HLD, DM, history of diagnostic cardiac catheterization @ [facility] in 2013, Carotid Artery Disease s/p R Sided Carotid Endarterectomy, who presents w/ STARKEY after 1/2 flight of stairs and does not occur at rest

## 2019-09-26 NOTE — CONSULT NOTE ADULT - SUBJECTIVE AND OBJECTIVE BOX
HPI: 59yFemale    Age at Dx:  How dx:  Hx and duration of insulin:  Current Therapy:  Hx of hypoglycemia  Hx of DKA/HHS?    Home FSG:  Fasting  Lunch  Dinner  Bed    Hx of other regimens  Complications:  Outpatient Endo:    PMH & Surgical Hx:CAD I25.10  Family history of early CAD  Handoff  MEWS Score  Diabetes mellitus  Hyperlipemia  Hypertension  History of cardiac cath  H/O carotid endarterectomy      FH:  DM:  Thyroid:  Autoimmune:  Other:    SH:  Smoking  Etoh:  Recreational Drugs:  Social Life:    Current Meds:  aspirin  chewable 81 milliGRAM(s) Oral daily  chlorhexidine 0.12% Liquid 5 milliLiter(s) Swish and Spit once  chlorhexidine 4% Liquid 1 Application(s) Topical once  chlorhexidine 4% Liquid 1 Application(s) Topical once  dextrose 40% Gel 15 Gram(s) Oral once PRN  dextrose 5%. 1000 milliLiter(s) IV Continuous <Continuous>  dextrose 50% Injectable 12.5 Gram(s) IV Push once  dextrose 50% Injectable 25 Gram(s) IV Push once  dextrose 50% Injectable 25 Gram(s) IV Push once  escitalopram 5 milliGRAM(s) Oral daily  glucagon  Injectable 1 milliGRAM(s) IntraMuscular Once PRN  heparin  Injectable 5000 Unit(s) SubCutaneous every 8 hours  insulin lispro (HumaLOG) corrective regimen sliding scale   SubCutaneous Before meals and at bedtime  meclizine 25 milliGRAM(s) Oral every 12 hours  metoprolol tartrate 12.5 milliGRAM(s) Oral every 12 hours  simvastatin 20 milliGRAM(s) Oral at bedtime  sodium chloride 0.9% lock flush 3 milliLiter(s) IV Push every 8 hours      Allergies:  No Known Allergies      ROS:  Denies the following except as indicated.    General: weight loss/weight gain, decreased appetite, fatigue  Eyes: Blurry vision, double vision, visual changes  ENT: Throat pain, changes in voice,   CV: palpitations, SOB, CP, cough  GI: NVD, difficulty swallowing, abdominal pain  : polyuria, dysuria  Endo: abnormal menses, temperature intolerance, decreased libido  MSK: weakness, joint pain  Skin: rash, dryness, diaphoresis  Heme: Easy bruising,bleeding  Neuro: HA, dizziness, lightheadedness, numbness tingling  Psych: Anxiety, Depression    Vital Signs Last 24 Hrs  T(C): 36.9 (26 Sep 2019 17:29), Max: 36.9 (26 Sep 2019 17:29)  T(F): 98.4 (26 Sep 2019 17:29), Max: 98.4 (26 Sep 2019 17:29)  HR: 92 (26 Sep 2019 17:15) (76 - 92)  BP: 151/83 (26 Sep 2019 17:15) (113/66 - 155/79)  BP(mean): 118 (26 Sep 2019 17:15) (83 - 118)  RR: 17 (26 Sep 2019 17:15) (16 - 17)  SpO2: 95% (26 Sep 2019 17:15) (92% - 96%)  Height (cm): 165.1 (09-26 @ 11:55)  Weight (kg): 76.2 (09-26 @ 11:55)  BMI (kg/m2): 28 (09-26 @ 11:55)      Constitutional: wn/wd in NAD.   HEENT: NCAT, MMM, OP clear, EOMI, , no proptosis or lid retraction  Neck: no thyromegaly or palpable thyroid nodules   Respiratory: lungs CTAB.  Cardiovascular: regular rhythm, normal S1 and S2, no audible murmurs, no peripheral edema  GI: soft, NT/ND, no masses/HSM appreciated.  Neurology: no tremors, DTR 2+  Skin: no visible rashes/lesions  Psychiatric: AAO x 3, normal affect/mood.  Ext: radial pulses intact, DP pulses intact, extremities warm, no cyanosis, clubbing or edema.       LABS:                        11.5   8.58  )-----------( 295      ( 26 Sep 2019 05:48 )             37.9     09-26    139  |  100  |  14  ----------------------------<  249<H>  4.0   |  27  |  0.60    Ca    9.0      26 Sep 2019 05:48    TPro  6.7  /  Alb  3.7  /  TBili  0.2  /  DBili  x   /  AST  11  /  ALT  10  /  AlkPhos  55  09-26    PT/INR - ( 26 Sep 2019 05:48 )   PT: 11.4 sec;   INR: 1.01          PTT - ( 26 Sep 2019 05:48 )  PTT:28.4 sec    Hemoglobin A1C, Whole Blood: 9.2 (09-26 @ 11:39)  Hemoglobin A1C, Whole Blood: 9.2 (09-26 @ 05:48)  Hemoglobin A1C, Whole Blood: 9.1 (09-25 @ 11:22)    Thyroid Stimulating Hormone, Serum: 0.723 (09-26 @ 05:48)      RADIOLOGY & ADDITIONAL STUDIES:  CAPILLARY BLOOD GLUCOSE      POCT Blood Glucose.: 219 mg/dL (26 Sep 2019 16:48)  POCT Blood Glucose.: 286 mg/dL (26 Sep 2019 11:57)        A/P:59y Female    1.  DM  Please continue lantus       units at night / morning.  Please continue lispro      units before each meal.  Please continue lispro moderate / low dose sliding scale four times daily with meals and at bedtime    Pt's fingerstick glucose goal is     Will continue to monitor     For discharge, pt can continue    Pt can follow up at discharge with [facility] by calling  to make an appointment.   Will discuss case with     and update primary team HPI: 1 y/o Kinyarwanda Speaking F former 5-6 cigarette x 20yrs (quit 2yrs ago) F with strong FMHX of CAD and PMH of HTN, HLD, DM, history of diagnostic cardiac catheterization @ [facility] in 2013, Carotid Artery Disease s/p R Sided Carotid Endarterectomy, who presents w/ STARKEY after 1/2 flight of stairs and does not occur at rest (CCS Angina Equivalent Class 3 Sx). Patient additionally admits to occasional dizziness. Patient denied CP, diaphoresis, LE edema, orthopnea, palpitations, PND, fatigue, N/V or syncope. Patient was referred to Dr. Palomo for further cardiac evaluation. Per MD note, echo showed normal LVEF with no significant valvular abnormality. NST in 8/2019 showed a medium size completely reversible perfusion defect of moderate intensity involving the mid-apical entire septum (anterior and posterior) and the inferior myocardial walls suggestive of inducible myocardial ischemia in the posterior and LAD coronary circulation distributions.   Due to patient's risk factors, strong family history, abnormal stress test, and CCS angina class 3 equivalent symptoms, patient was referred for cardiac catheterization with PCI if indicated.        Age at Dx:  How dx:  Hx and duration of insulin:  Current Therapy:  Hx of hypoglycemia  Hx of DKA/HHS?    Home FSG:  Fasting  Lunch  Dinner  Bed    Hx of other regimens  Complications:  Outpatient Endo:    PMH & Surgical Hx:CAD I25.10  Family history of early CAD  Handoff  MEWS Score  Diabetes mellitus  Hyperlipemia  Hypertension  History of cardiac cath  H/O carotid endarterectomy      FH:  DM:  Thyroid:  Autoimmune:  Other:    SH:  Smoking  Etoh:  Recreational Drugs:  Social Life:    Current Meds:  aspirin  chewable 81 milliGRAM(s) Oral daily  chlorhexidine 0.12% Liquid 5 milliLiter(s) Swish and Spit once  chlorhexidine 4% Liquid 1 Application(s) Topical once  chlorhexidine 4% Liquid 1 Application(s) Topical once  dextrose 40% Gel 15 Gram(s) Oral once PRN  dextrose 5%. 1000 milliLiter(s) IV Continuous <Continuous>  dextrose 50% Injectable 12.5 Gram(s) IV Push once  dextrose 50% Injectable 25 Gram(s) IV Push once  dextrose 50% Injectable 25 Gram(s) IV Push once  escitalopram 5 milliGRAM(s) Oral daily  glucagon  Injectable 1 milliGRAM(s) IntraMuscular Once PRN  heparin  Injectable 5000 Unit(s) SubCutaneous every 8 hours  insulin lispro (HumaLOG) corrective regimen sliding scale   SubCutaneous Before meals and at bedtime  meclizine 25 milliGRAM(s) Oral every 12 hours  metoprolol tartrate 12.5 milliGRAM(s) Oral every 12 hours  simvastatin 20 milliGRAM(s) Oral at bedtime  sodium chloride 0.9% lock flush 3 milliLiter(s) IV Push every 8 hours      Allergies:  No Known Allergies      ROS:  Denies the following except as indicated.    General: weight loss/weight gain, decreased appetite, fatigue  Eyes: Blurry vision, double vision, visual changes  ENT: Throat pain, changes in voice,   CV: palpitations, SOB, CP, cough  GI: NVD, difficulty swallowing, abdominal pain  : polyuria, dysuria  Endo: abnormal menses, temperature intolerance, decreased libido  MSK: weakness, joint pain  Skin: rash, dryness, diaphoresis  Heme: Easy bruising,bleeding  Neuro: HA, dizziness, lightheadedness, numbness tingling  Psych: Anxiety, Depression    Vital Signs Last 24 Hrs  T(C): 36.9 (26 Sep 2019 17:29), Max: 36.9 (26 Sep 2019 17:29)  T(F): 98.4 (26 Sep 2019 17:29), Max: 98.4 (26 Sep 2019 17:29)  HR: 92 (26 Sep 2019 17:15) (76 - 92)  BP: 151/83 (26 Sep 2019 17:15) (113/66 - 155/79)  BP(mean): 118 (26 Sep 2019 17:15) (83 - 118)  RR: 17 (26 Sep 2019 17:15) (16 - 17)  SpO2: 95% (26 Sep 2019 17:15) (92% - 96%)  Height (cm): 165.1 (09-26 @ 11:55)  Weight (kg): 76.2 (09-26 @ 11:55)  BMI (kg/m2): 28 (09-26 @ 11:55)      Constitutional: wn/wd in NAD.   HEENT: NCAT, MMM, OP clear, EOMI, , no proptosis or lid retraction  Neck: no thyromegaly or palpable thyroid nodules   Respiratory: lungs CTAB.  Cardiovascular: regular rhythm, normal S1 and S2, no audible murmurs, no peripheral edema  GI: soft, NT/ND, no masses/HSM appreciated.  Neurology: no tremors, DTR 2+  Skin: no visible rashes/lesions  Psychiatric: AAO x 3, normal affect/mood.  Ext: radial pulses intact, DP pulses intact, extremities warm, no cyanosis, clubbing or edema.       LABS:                        11.5   8.58  )-----------( 295      ( 26 Sep 2019 05:48 )             37.9     09-26    139  |  100  |  14  ----------------------------<  249<H>  4.0   |  27  |  0.60    Ca    9.0      26 Sep 2019 05:48    TPro  6.7  /  Alb  3.7  /  TBili  0.2  /  DBili  x   /  AST  11  /  ALT  10  /  AlkPhos  55  09-26    PT/INR - ( 26 Sep 2019 05:48 )   PT: 11.4 sec;   INR: 1.01          PTT - ( 26 Sep 2019 05:48 )  PTT:28.4 sec    Hemoglobin A1C, Whole Blood: 9.2 (09-26 @ 11:39)  Hemoglobin A1C, Whole Blood: 9.2 (09-26 @ 05:48)  Hemoglobin A1C, Whole Blood: 9.1 (09-25 @ 11:22)    Thyroid Stimulating Hormone, Serum: 0.723 (09-26 @ 05:48)      RADIOLOGY & ADDITIONAL STUDIES:  CAPILLARY BLOOD GLUCOSE      POCT Blood Glucose.: 219 mg/dL (26 Sep 2019 16:48)  POCT Blood Glucose.: 286 mg/dL (26 Sep 2019 11:57)        A/P:59y Female    1.  DM  Please continue lantus       units at night / morning.  Please continue lispro      units before each meal.  Please continue lispro moderate / low dose sliding scale four times daily with meals and at bedtime    Pt's fingerstick glucose goal is     Will continue to monitor     For discharge, pt can continue    Pt can follow up at discharge with [facility] by calling  to make an appointment.   Will discuss case with     and update primary team HPI: 60 y/o Bengali Speaking F former 5-6 cigarette x 20yrs (quit 2yrs ago) F with strong FMHX of CAD and PMH of HTN, HLD, DM, history of diagnostic cardiac catheterization @ [facility] in , Carotid Artery Disease s/p R Sided Carotid Endarterectomy, who presents w/ STARKEY after 1/2 flight of stairs and does not occur at rest (CCS Angina Equivalent Class 3 Sx). Patient additionally admits to occasional dizziness. Patient denied CP, diaphoresis, LE edema, orthopnea, palpitations, PND, fatigue, N/V or syncope. Patient was referred to Dr. Palomo for further cardiac evaluation. Per MD note, echo showed normal LVEF with no significant valvular abnormality. NST in 2019 showed a medium size completely reversible perfusion defect of moderate intensity involving the mid-apical entire septum (anterior and posterior) and the inferior myocardial walls suggestive of inducible myocardial ischemia in the posterior and LAD coronary circulation distributions.   Due to patient's risk factors, strong family history, abnormal stress test, and CCS angina class 3 equivalent symptoms, patient was referred for cardiac catheterization with PCI if indicated. Cath done revealed LAD- 100%  proximal with R-L and L-L collaterals, pD1-  90% stenosis, LCX- mild disease, pOM1- 80% stenosis, pOM2- 80% stenosis, RCA- Dominant with mild luminal irregularities, LM normal. LVEF normal 60%, LVEDP normal, no AS/MR. She is being planned for MID-CABG tomorrow.    Endocrine was consulted for her DM management. In the hospital, patient was started on lispro MDSS. Hba1c was 9.2, TSh was 0.723 and free T4 was 1.09. Liver enzymes were wnl.     FSG & Insulin received:  Today:  pre-breakfast fs,   pre-lunch fs, 6 units lispro SS    Endocrine History  Age at Dx: 10 years ago  How dx: by a routine blood work  Hx and duration of insulin: has never been on insulin  Current Therapy: metformin 1000 BID, januvia 100, and Glimepiride 4mg once daily. She was initially on metformin along. But januvia and glimepiride was started 4 years ago  Hx of hypoglycemia - none. the lowest FSG are in 120  Hx of DKA/HHS - denies    Home FSG: Patient measures her FSG twice a day - once in fasting when her FSg runs in 130 t0 150. Later in the day, her FSg runs in 210 to 270s. she eats cheese with olives, brown bread for breakfats, she has arabic meal with beef, egg plant, potatoes, rice and vegetables for lunch. she usually does not dinner and eats only fruits with one apple, peach or oranges. She has some snack of 2 cookies once a week. otherwise does not snack a lot.    Hx of other regimens  Complications: denies  Outpatient Endo:  in [facility] Hx:CAD I25.10  Family history of early CAD  Diabetes mellitus  Hyperlipemia  Hypertension  History of cardiac cath  H/O carotid endarterectomy      FH:  DM: DM in mother at age 50s  Thyroid: none  Autoimmune: none  Other: CAD history in brother at Age 43 and sister at age 50    SH:  Smoking - former smoker, quit 2 years ago  Etoh: denies  Recreational Drugs: denies    Current Meds:  aspirin  chewable 81 milliGRAM(s) Oral daily  chlorhexidine 0.12% Liquid 5 milliLiter(s) Swish and Spit once  chlorhexidine 4% Liquid 1 Application(s) Topical once  chlorhexidine 4% Liquid 1 Application(s) Topical once  dextrose 40% Gel 15 Gram(s) Oral once PRN  dextrose 5%. 1000 milliLiter(s) IV Continuous <Continuous>  dextrose 50% Injectable 12.5 Gram(s) IV Push once  dextrose 50% Injectable 25 Gram(s) IV Push once  dextrose 50% Injectable 25 Gram(s) IV Push once  escitalopram 5 milliGRAM(s) Oral daily  glucagon  Injectable 1 milliGRAM(s) IntraMuscular Once PRN  heparin  Injectable 5000 Unit(s) SubCutaneous every 8 hours  insulin lispro (HumaLOG) corrective regimen sliding scale   SubCutaneous Before meals and at bedtime  meclizine 25 milliGRAM(s) Oral every 12 hours  metoprolol tartrate 12.5 milliGRAM(s) Oral every 12 hours  simvastatin 20 milliGRAM(s) Oral at bedtime  sodium chloride 0.9% lock flush 3 milliLiter(s) IV Push every 8 hours      Allergies:  No Known Allergies      ROS:  Denies the following except as indicated.    General: weight loss/weight gain, decreased appetite, fatigue  Eyes: Blurry vision, double vision, visual changes  ENT: Throat pain, changes in voice,   CV: palpitations, CP, cough  GI: NVD, difficulty swallowing, abdominal pain  : polyuria, dysuria  Endo: abnormal menses, temperature intolerance, decreased libido  MSK: weakness, joint pain  Skin: rash, dryness, diaphoresis  Heme: Easy bruising,bleeding  Neuro: HA, dizziness, lightheadedness, numbness tingling  Psych: Anxiety, Depression    Vital Signs Last 24 Hrs  T(C): 36.9 (26 Sep 2019 17:29), Max: 36.9 (26 Sep 2019 17:29)  T(F): 98.4 (26 Sep 2019 17:29), Max: 98.4 (26 Sep 2019 17:29)  HR: 92 (26 Sep 2019 17:15) (76 - 92)  BP: 151/83 (26 Sep 2019 17:15) (113/66 - 155/79)  BP(mean): 118 (26 Sep 2019 17:15) (83 - 118)  RR: 17 (26 Sep 2019 17:15) (16 - 17)  SpO2: 95% (26 Sep 2019 17:15) (92% - 96%)  Height (cm): 165.1 ( @ 11:55)  Weight (kg): 76.2 ( @ 11:55)  BMI (kg/m2): 28 ( @ 11:55)      Constitutional: wn/wd in NAD.   HEENT: NCAT, MMM, OP clear, EOMI, , no proptosis or lid retraction  Neck: no thyromegaly or palpable thyroid nodules   Respiratory: lungs CTAB.  Cardiovascular: regular rhythm, normal S1 and S2, no audible murmurs, no peripheral edema  GI: soft, NT/ND, no masses/HSM appreciated.  Neurology: no tremors, DTR 2+, no focal neurological deficits  Skin: no visible rashes/lesions  Psychiatric: AAO x 3, normal affect/mood.  Ext: radial pulses intact, DP pulses intact, extremities warm, no cyanosis, clubbing or edema.       LABS:                        11.5   8.58  )-----------( 295      ( 26 Sep 2019 05:48 )             37.9         139  |  100  |  14  ----------------------------<  249<H>  4.0   |  27  |  0.60    Ca    9.0      26 Sep 2019 05:48    TPro  6.7  /  Alb  3.7  /  TBili  0.2  /  DBili  x   /  AST  11  /  ALT  10  /  AlkPhos  55      PT/INR - ( 26 Sep 2019 05:48 )   PT: 11.4 sec;   INR: 1.01          PTT - ( 26 Sep 2019 05:48 )  PTT:28.4 sec    Hemoglobin A1C, Whole Blood: 9.2 ( @ 11:39)  Hemoglobin A1C, Whole Blood: 9.2 ( @ 05:48)  Hemoglobin A1C, Whole Blood: 9.1 ( @ 11:22)    Thyroid Stimulating Hormone, Serum: 0.723 ( @ 05:48)      RADIOLOGY & ADDITIONAL STUDIES:  CAPILLARY BLOOD GLUCOSE      POCT Blood Glucose.: 219 mg/dL (26 Sep 2019 16:48)  POCT Blood Glucose.: 286 mg/dL (26 Sep 2019 11:57)        A/P: 60 y/o Bengali Speaking F former 5-6 cigarette x 20yrs (quit 2yrs ago) F with strong FMHX of CAD and PMH of HTN, HLD, DM, history of diagnostic cardiac catheterization @ [facility] in , Carotid Artery Disease s/p R Sided Carotid Endarterectomy, got admitted for MID-CABG.    1.  DM Type 2 - uncontrolled - with no complications  Hba1c 9.2  Wt 75kg  Cr/GFR 0.62/97 ECHO showed EF 60%  Please start on lantus  15   units at night.  Please start on lispro  5    units before each meal.    Please continue lispro moderate dose sliding scale four times daily with meals and at bedtime  carb consistent diet  Pt's fingerstick glucose goal is 120 to 150    2. Hyperlipidemia  Lipid profile showed CHol 142, , LDL 68 and HDL 37  - family h/o early CAD  - Please get lipoprotein a level    Will continue to monitor     For discharge,TBD    Pt can follow up at discharge with [facility] by calling  to make an appointment.   discussed case with     and updated primary team    REMINDERS FOR INSULIN/DIABETES SUPPLIES at DISCHARGE:  INSULIN:   Long actin/Basal Insulin: Examples: Toujeo, Basaglar, Tresiba, Lantus   Short acting/Bolus Insulin: Humalog, Admelog, Novolog  Please ensure that BOTH short acting and long acting insulin are prescribed in the same preparation (Ex: PEN vs VIAL/SOLUTION)     TESTING SUPPLIES:   All glucometer supplies should be written as generic to avoid issues with insurance. Use the free text option in sunrise prescription writer, and type in glucometer test strips, lancets, etc to order.    If sending patient home on insulin PEN, please send:   •	BD papo insulin pen needles for use up to 4 times daily (total quantity 100)  •	Lancets for use up to 4 times daily (total quantity 100)  •	Glucometer Test strips for use up to 4 times daily (total quantity 100)  •	Alcohol swabs for use up to 4 times daily (total quantity 100)  •	Glucometer (If provided by hospital, still provide scripts for lancets, test strips, and swabs)  If sending patient home on insulin VIAL, please send:   •	Insulin syringes (6mm) - for use up to 4 times daily (total quantity 100)  •	Lancets for use up to 4 times daily (total quantity 100)  •	Generic Glucometer Test strips for use up to 4 times daily (total quantity 100)  •	Alcohol swabs for use up to 4 times daily (total quantity 100)  •	Generic Glucometer (If provided by hospital, still provide scripts for lancets, test strips, and swabs)  •	Do not specify brand for testing supplies (such as contour, freestyle, one touch etc) that way the pharmacy has the freedom to pick and change according to what the insurance dictates.  For patients without insurance:   •	Provide social work with appropriate scripts so they may obtain 1 week of samples  •	Provide with glucometer. Glucometers are located at various nursing stations, the nursing office, education, and endocrine fellows office.  •	Please make appointment with Katarina Champagne NP or Ebony Mercedes RN and SHELYL Romero at the [address] [place] [address] endocrinology clinic. They can see patients without insurance, provide appropriate samples, and assist in getting insurance coverage.     PREFERRED PHARMACY:  [facility] (located on 1st floor next to admitting)  P: [phone]  Hours: M – F 8AM – 8PM, Sat 8AM – 4PM, Sun—closed  If not using [facility], please follow up with chosen pharmacy to ensure insulin prescribed is covered.

## 2019-09-26 NOTE — PHYSICAL THERAPY INITIAL EVALUATION ADULT - GENERAL OBSERVATIONS, REHAB EVAL
Patient received semi-supine no acute distress +telemetry, cleared for PT by DIMA Araujo, agreeable to PT Eval. Left seated out of bed no acute distress +call DIMA casanova aware. FIM 4

## 2019-09-26 NOTE — PHYSICAL THERAPY INITIAL EVALUATION ADULT - ADDITIONAL COMMENTS
reports she owns rolling walker which she used ~10 months ago s/p neck sx; was not ambulating with device prior to admission

## 2019-09-26 NOTE — PROGRESS NOTE ADULT - SUBJECTIVE AND OBJECTIVE BOX
Planned Date of Surgery:       9/27/2019                                                                                                           Surgeon: Dr. Arenas    Procedure: MIDCAB    HPI:  58 y/o Italian Speaking F former 5-6 cigarette x 20yrs (quit 2yrs ago) with strong FMHx of CAD and PMH of HTN, HLD, DM, history of diagnostic cardiac catheterization @ [facility] in 2013, Carotid Artery Disease s/p R Sided Carotid Endarterectomy, who presents w/ STARKEY after 1/2 flight of stairs and does not occur at rest (CCS Angina Equivalent Class 3 Sx). Patient additionally admits to occasional dizziness. Patient denied CP, diaphoresis, LE edema, orthopnea, palpitations, PND, fatigue, N/V or syncope. Patient was referred to Dr. Palomo for further cardiac evaluation. Per MD note, echo showed normal LVEF with no significant valvular abnormality. N ST in 8/2019 showed a medium size completely reversible perfusion defect of moderate intensity involving the mid-apical entire septum (anterior and posterior) and the inferior myocardial walls suggestive of inducible myocardial ischemia in the posterior and LAD coronary circulation distributions. Cath today revealed LAD- 100%  proximal with R-L and L-L collaterals, pD1-  90% stenosis, LCX- mild disease, pOM1- 80% stenosis, pOM2- 80% stenosis, RCA- Dominant with mild luminal irregularities, LM normal. LVEF normal 60%, LVEDP normal, no AS/MR    PAST MEDICAL & SURGICAL HISTORY:  Diabetes mellitus  Hyperlipemia  Hypertension  History of cardiac cath: 2013 ([facility])  H/O carotid endarterectomy: R side - Sptember 2018      No Known Allergies      Physical Exam  T(C): 36.7 (09-26-19 @ 15:09), Max: 36.7 (09-26-19 @ 15:09)  HR: 76 (09-26-19 @ 12:25) (76 - 84)  BP: 116/65 (09-26-19 @ 12:25) (113/66 - 155/79)  RR: 17 (09-26-19 @ 12:25) (16 - 17)  SpO2: 96% (09-26-19 @ 12:25) (92% - 96%)  CONSTITUTIONAL: NAD, stable  NEURO: A&O x3, no focal deficits                  EYES: EOMI in tact  ENMT: normocephalic, atraumatic  CV: Regular rate and rhythm  RESPIRATORY:  non-labored breathing, chest expansion symmetric  GI: soft, nontender  : no fierro    MEDICATIONS  (STANDING):  aspirin  chewable 81 milliGRAM(s) Oral daily  chlorhexidine 0.12% Liquid 5 milliLiter(s) Swish and Spit once  chlorhexidine 4% Liquid 1 Application(s) Topical once  chlorhexidine 4% Liquid 1 Application(s) Topical once  dextrose 5%. 1000 milliLiter(s) (50 mL/Hr) IV Continuous <Continuous>  dextrose 50% Injectable 12.5 Gram(s) IV Push once  dextrose 50% Injectable 25 Gram(s) IV Push once  dextrose 50% Injectable 25 Gram(s) IV Push once  escitalopram 5 milliGRAM(s) Oral daily  heparin  Injectable 5000 Unit(s) SubCutaneous every 8 hours  insulin lispro (HumaLOG) corrective regimen sliding scale   SubCutaneous Before meals and at bedtime  meclizine 25 milliGRAM(s) Oral every 12 hours  metoprolol tartrate 12.5 milliGRAM(s) Oral every 12 hours  simvastatin 20 milliGRAM(s) Oral at bedtime  sodium chloride 0.9% lock flush 3 milliLiter(s) IV Push every 8 hours    MEDICATIONS  (PRN):  dextrose 40% Gel 15 Gram(s) Oral once PRN Blood Glucose LESS THAN 70 milliGRAM(s)/deciliter  glucagon  Injectable 1 milliGRAM(s) IntraMuscular Once PRN Glucose LESS THAN 70 milligrams/deciliter      On Beta Blocker? YES     Labs:                        11.5   8.58  )-----------( 295      ( 26 Sep 2019 05:48 )             37.9     09-26    139  |  100  |  14  ----------------------------<  249<H>  4.0   |  27  |  0.60    Ca    9.0      26 Sep 2019 05:48    TPro  6.7  /  Alb  3.7  /  TBili  0.2  /  DBili  x   /  AST  11  /  ALT  10  /  AlkPhos  55  09-26    PT/INR - ( 26 Sep 2019 05:48 )   PT: 11.4 sec;   INR: 1.01          PTT - ( 26 Sep 2019 05:48 )  PTT:28.4 sec    Hemoglobin A1C, Whole Blood: 9.2 % (09-26-19 @ 11:39)      CARDIAC MARKERS ( 25 Sep 2019 11:22 )  x     / x     / 38 U/L / x     / x              Hgb A1C: 9.2    EKG: NSR 78BPM    CXR: No acute infiltrates, no obvious effusion, no pneumothorax, official read pending        CT Scans: none     Cath Report: LAD- 100%  proximal with R-L and L-L collaterals, pD1-  90% stenosis, LCX- mild disease, pOM1- 80% stenosis, pOM2- 80% stenosis, RCA- Dominant with mild luminal irregularities, LM normal. LVEF normal 60%, LVEDP normal, no AS/MR    Echo: EF 65%  < from: Echocardiogram (09.26.19 @ 12:21) >     1. Normal left and right ventricular size and systolic function.   2. The aortic valve is moderately thickened.   3. The mitral valve is moderately thickened.    < end of copied text >    PFT's: FEV 1 89%    Carotid Duplex:  < from: US Duplex Carotid Arteries Complete, Bilateral (09.25.19 @ 19:41) >  VERTEBRAL ARTERIES:   Antegrade flow was seen within both vertebral arteries.      IMPRESSION:  Small plaque bilaterally without hemodynamically significant carotid   stenosis.    < end of copied text >    Consult in Chart?  YES   Consent in Chart? YES   Pre-op Orders Placed? YES   Blood Prodeucts Ordered? YES   NPO ordered? YES / NO

## 2019-09-27 ENCOUNTER — APPOINTMENT (OUTPATIENT)
Dept: CARDIOTHORACIC SURGERY | Facility: HOSPITAL | Age: 59
End: 2019-09-27

## 2019-09-27 LAB
ALBUMIN SERPL ELPH-MCNC: 3.3 G/DL — SIGNIFICANT CHANGE UP (ref 3.3–5)
ALBUMIN SERPL ELPH-MCNC: 3.7 G/DL — SIGNIFICANT CHANGE UP (ref 3.3–5)
ALBUMIN SERPL ELPH-MCNC: 3.7 G/DL — SIGNIFICANT CHANGE UP (ref 3.3–5)
ALP SERPL-CCNC: 56 U/L — SIGNIFICANT CHANGE UP (ref 40–120)
ALP SERPL-CCNC: 60 U/L — SIGNIFICANT CHANGE UP (ref 40–120)
ALP SERPL-CCNC: 62 U/L — SIGNIFICANT CHANGE UP (ref 40–120)
ALT FLD-CCNC: 17 U/L — SIGNIFICANT CHANGE UP (ref 10–45)
ALT FLD-CCNC: 20 U/L — SIGNIFICANT CHANGE UP (ref 10–45)
ALT FLD-CCNC: 20 U/L — SIGNIFICANT CHANGE UP (ref 10–45)
ANION GAP SERPL CALC-SCNC: 10 MMOL/L — SIGNIFICANT CHANGE UP (ref 5–17)
ANION GAP SERPL CALC-SCNC: 16 MMOL/L — SIGNIFICANT CHANGE UP (ref 5–17)
ANION GAP SERPL CALC-SCNC: 9 MMOL/L — SIGNIFICANT CHANGE UP (ref 5–17)
ANISOCYTOSIS BLD QL: SLIGHT — SIGNIFICANT CHANGE UP
APTT BLD: 26.3 SEC — LOW (ref 27.5–36.3)
APTT BLD: 27 SEC — LOW (ref 27.5–36.3)
APTT BLD: 28.8 SEC — SIGNIFICANT CHANGE UP (ref 27.5–36.3)
AST SERPL-CCNC: 25 U/L — SIGNIFICANT CHANGE UP (ref 10–40)
AST SERPL-CCNC: 30 U/L — SIGNIFICANT CHANGE UP (ref 10–40)
AST SERPL-CCNC: 40 U/L — SIGNIFICANT CHANGE UP (ref 10–40)
BASE EXCESS BLDA CALC-SCNC: -4 MMOL/L — LOW (ref -2–3)
BASE EXCESS BLDA CALC-SCNC: -4.4 MMOL/L — LOW (ref -2–3)
BASE EXCESS BLDA CALC-SCNC: 1.5 MMOL/L — SIGNIFICANT CHANGE UP (ref -2–3)
BASOPHILS # BLD AUTO: 0.05 K/UL — SIGNIFICANT CHANGE UP (ref 0–0.2)
BASOPHILS NFR BLD AUTO: 0.3 % — SIGNIFICANT CHANGE UP (ref 0–2)
BILIRUB SERPL-MCNC: 0.2 MG/DL — SIGNIFICANT CHANGE UP (ref 0.2–1.2)
BILIRUB SERPL-MCNC: 0.3 MG/DL — SIGNIFICANT CHANGE UP (ref 0.2–1.2)
BILIRUB SERPL-MCNC: <0.2 MG/DL — SIGNIFICANT CHANGE UP (ref 0.2–1.2)
BUN SERPL-MCNC: 11 MG/DL — SIGNIFICANT CHANGE UP (ref 7–23)
BUN SERPL-MCNC: 12 MG/DL — SIGNIFICANT CHANGE UP (ref 7–23)
BUN SERPL-MCNC: 12 MG/DL — SIGNIFICANT CHANGE UP (ref 7–23)
CALCIUM SERPL-MCNC: 8.4 MG/DL — SIGNIFICANT CHANGE UP (ref 8.4–10.5)
CALCIUM SERPL-MCNC: 8.4 MG/DL — SIGNIFICANT CHANGE UP (ref 8.4–10.5)
CALCIUM SERPL-MCNC: 8.6 MG/DL — SIGNIFICANT CHANGE UP (ref 8.4–10.5)
CHLORIDE SERPL-SCNC: 101 MMOL/L — SIGNIFICANT CHANGE UP (ref 96–108)
CHLORIDE SERPL-SCNC: 101 MMOL/L — SIGNIFICANT CHANGE UP (ref 96–108)
CHLORIDE SERPL-SCNC: 98 MMOL/L — SIGNIFICANT CHANGE UP (ref 96–108)
CO2 SERPL-SCNC: 22 MMOL/L — SIGNIFICANT CHANGE UP (ref 22–31)
CO2 SERPL-SCNC: 26 MMOL/L — SIGNIFICANT CHANGE UP (ref 22–31)
CO2 SERPL-SCNC: 26 MMOL/L — SIGNIFICANT CHANGE UP (ref 22–31)
CREAT SERPL-MCNC: 0.51 MG/DL — SIGNIFICANT CHANGE UP (ref 0.5–1.3)
CREAT SERPL-MCNC: 0.51 MG/DL — SIGNIFICANT CHANGE UP (ref 0.5–1.3)
CREAT SERPL-MCNC: 0.53 MG/DL — SIGNIFICANT CHANGE UP (ref 0.5–1.3)
EOSINOPHIL # BLD AUTO: 0.04 K/UL — SIGNIFICANT CHANGE UP (ref 0–0.5)
EOSINOPHIL NFR BLD AUTO: 0.3 % — SIGNIFICANT CHANGE UP (ref 0–6)
GAS PNL BLDA: SIGNIFICANT CHANGE UP
GLUCOSE BLDC GLUCOMTR-MCNC: 125 MG/DL — HIGH (ref 70–99)
GLUCOSE BLDC GLUCOMTR-MCNC: 154 MG/DL — HIGH (ref 70–99)
GLUCOSE BLDC GLUCOMTR-MCNC: 177 MG/DL — HIGH (ref 70–99)
GLUCOSE BLDC GLUCOMTR-MCNC: 205 MG/DL — HIGH (ref 70–99)
GLUCOSE BLDC GLUCOMTR-MCNC: 237 MG/DL — HIGH (ref 70–99)
GLUCOSE BLDC GLUCOMTR-MCNC: 245 MG/DL — HIGH (ref 70–99)
GLUCOSE BLDC GLUCOMTR-MCNC: 257 MG/DL — HIGH (ref 70–99)
GLUCOSE BLDC GLUCOMTR-MCNC: 262 MG/DL — HIGH (ref 70–99)
GLUCOSE BLDC GLUCOMTR-MCNC: 267 MG/DL — HIGH (ref 70–99)
GLUCOSE BLDC GLUCOMTR-MCNC: 298 MG/DL — HIGH (ref 70–99)
GLUCOSE BLDC GLUCOMTR-MCNC: 83 MG/DL — SIGNIFICANT CHANGE UP (ref 70–99)
GLUCOSE SERPL-MCNC: 248 MG/DL — HIGH (ref 70–99)
GLUCOSE SERPL-MCNC: 267 MG/DL — HIGH (ref 70–99)
GLUCOSE SERPL-MCNC: 91 MG/DL — SIGNIFICANT CHANGE UP (ref 70–99)
HCO3 BLDA-SCNC: 20 MMOL/L — LOW (ref 21–28)
HCO3 BLDA-SCNC: 21 MMOL/L — SIGNIFICANT CHANGE UP (ref 21–28)
HCO3 BLDA-SCNC: 26 MMOL/L — SIGNIFICANT CHANGE UP (ref 21–28)
HCT VFR BLD CALC: 34.2 % — LOW (ref 34.5–45)
HCT VFR BLD CALC: 34.5 % — SIGNIFICANT CHANGE UP (ref 34.5–45)
HCT VFR BLD CALC: 35.1 % — SIGNIFICANT CHANGE UP (ref 34.5–45)
HGB BLD-MCNC: 10.8 G/DL — LOW (ref 11.5–15.5)
HGB BLD-MCNC: 10.9 G/DL — LOW (ref 11.5–15.5)
HGB BLD-MCNC: 11 G/DL — LOW (ref 11.5–15.5)
IMM GRANULOCYTES NFR BLD AUTO: 1.8 % — HIGH (ref 0–1.5)
INR BLD: 1.06 — SIGNIFICANT CHANGE UP (ref 0.88–1.16)
INR BLD: 1.08 — SIGNIFICANT CHANGE UP (ref 0.88–1.16)
INR BLD: 1.15 — SIGNIFICANT CHANGE UP (ref 0.88–1.16)
LACTATE SERPL-SCNC: 1.5 MMOL/L — SIGNIFICANT CHANGE UP (ref 0.5–2)
LACTATE SERPL-SCNC: 1.9 MMOL/L — SIGNIFICANT CHANGE UP (ref 0.5–2)
LACTATE SERPL-SCNC: 2.5 MMOL/L — HIGH (ref 0.5–2)
LACTATE SERPL-SCNC: 4.8 MMOL/L — CRITICAL HIGH (ref 0.5–2)
LYMPHOCYTES # BLD AUTO: 1.52 K/UL — SIGNIFICANT CHANGE UP (ref 1–3.3)
LYMPHOCYTES # BLD AUTO: 10.4 % — LOW (ref 13–44)
LYMPHOCYTES # BLD AUTO: 8 % — LOW (ref 13–44)
MAGNESIUM SERPL-MCNC: 1.4 MG/DL — LOW (ref 1.6–2.6)
MAGNESIUM SERPL-MCNC: 2 MG/DL — SIGNIFICANT CHANGE UP (ref 1.6–2.6)
MAGNESIUM SERPL-MCNC: 2.3 MG/DL — SIGNIFICANT CHANGE UP (ref 1.6–2.6)
MANUAL SMEAR VERIFICATION: SIGNIFICANT CHANGE UP
MCHC RBC-ENTMCNC: 23.3 PG — LOW (ref 27–34)
MCHC RBC-ENTMCNC: 23.3 PG — LOW (ref 27–34)
MCHC RBC-ENTMCNC: 23.4 PG — LOW (ref 27–34)
MCHC RBC-ENTMCNC: 31.3 GM/DL — LOW (ref 32–36)
MCHC RBC-ENTMCNC: 31.6 GM/DL — LOW (ref 32–36)
MCHC RBC-ENTMCNC: 31.6 GM/DL — LOW (ref 32–36)
MCV RBC AUTO: 73.7 FL — LOW (ref 80–100)
MCV RBC AUTO: 74.2 FL — LOW (ref 80–100)
MCV RBC AUTO: 74.4 FL — LOW (ref 80–100)
METAMYELOCYTES # FLD: 1 % — HIGH
MICROCYTES BLD QL: SLIGHT — SIGNIFICANT CHANGE UP
MONOCYTES # BLD AUTO: 0.58 K/UL — SIGNIFICANT CHANGE UP (ref 0–0.9)
MONOCYTES NFR BLD AUTO: 1 % — LOW (ref 2–14)
MONOCYTES NFR BLD AUTO: 4 % — SIGNIFICANT CHANGE UP (ref 2–14)
NEUTROPHILS # BLD AUTO: 12.21 K/UL — HIGH (ref 1.8–7.4)
NEUTROPHILS NFR BLD AUTO: 83 % — HIGH (ref 43–77)
NEUTROPHILS NFR BLD AUTO: 83.2 % — HIGH (ref 43–77)
NEUTS BAND # BLD: 7 % — SIGNIFICANT CHANGE UP
NRBC # BLD: 0 /100 WBCS — SIGNIFICANT CHANGE UP (ref 0–0)
OVALOCYTES BLD QL SMEAR: SLIGHT — SIGNIFICANT CHANGE UP
PCO2 BLDA: 37 MMHG — SIGNIFICANT CHANGE UP (ref 32–45)
PCO2 BLDA: 38 MMHG — SIGNIFICANT CHANGE UP (ref 32–45)
PCO2 BLDA: 42 MMHG — SIGNIFICANT CHANGE UP (ref 32–45)
PH BLDA: 7.36 — SIGNIFICANT CHANGE UP (ref 7.35–7.45)
PH BLDA: 7.36 — SIGNIFICANT CHANGE UP (ref 7.35–7.45)
PH BLDA: 7.41 — SIGNIFICANT CHANGE UP (ref 7.35–7.45)
PHOSPHATE SERPL-MCNC: 1.6 MG/DL — LOW (ref 2.5–4.5)
PHOSPHATE SERPL-MCNC: 3.3 MG/DL — SIGNIFICANT CHANGE UP (ref 2.5–4.5)
PHOSPHATE SERPL-MCNC: 3.6 MG/DL — SIGNIFICANT CHANGE UP (ref 2.5–4.5)
PLAT MORPH BLD: ABNORMAL
PLATELET # BLD AUTO: 248 K/UL — SIGNIFICANT CHANGE UP (ref 150–400)
PLATELET # BLD AUTO: 293 K/UL — SIGNIFICANT CHANGE UP (ref 150–400)
PLATELET # BLD AUTO: 300 K/UL — SIGNIFICANT CHANGE UP (ref 150–400)
PO2 BLDA: 75 MMHG — LOW (ref 83–108)
PO2 BLDA: 82 MMHG — LOW (ref 83–108)
PO2 BLDA: 87 MMHG — SIGNIFICANT CHANGE UP (ref 83–108)
POTASSIUM SERPL-MCNC: 3.3 MMOL/L — LOW (ref 3.5–5.3)
POTASSIUM SERPL-MCNC: 4.2 MMOL/L — SIGNIFICANT CHANGE UP (ref 3.5–5.3)
POTASSIUM SERPL-MCNC: 4.7 MMOL/L — SIGNIFICANT CHANGE UP (ref 3.5–5.3)
POTASSIUM SERPL-SCNC: 3.3 MMOL/L — LOW (ref 3.5–5.3)
POTASSIUM SERPL-SCNC: 4.2 MMOL/L — SIGNIFICANT CHANGE UP (ref 3.5–5.3)
POTASSIUM SERPL-SCNC: 4.7 MMOL/L — SIGNIFICANT CHANGE UP (ref 3.5–5.3)
PROT SERPL-MCNC: 6.1 G/DL — SIGNIFICANT CHANGE UP (ref 6–8.3)
PROT SERPL-MCNC: 6.4 G/DL — SIGNIFICANT CHANGE UP (ref 6–8.3)
PROT SERPL-MCNC: 6.6 G/DL — SIGNIFICANT CHANGE UP (ref 6–8.3)
PROTHROM AB SERPL-ACNC: 12 SEC — SIGNIFICANT CHANGE UP (ref 10–12.9)
PROTHROM AB SERPL-ACNC: 12.2 SEC — SIGNIFICANT CHANGE UP (ref 10–12.9)
PROTHROM AB SERPL-ACNC: 13.1 SEC — HIGH (ref 10–12.9)
RBC # BLD: 4.61 M/UL — SIGNIFICANT CHANGE UP (ref 3.8–5.2)
RBC # BLD: 4.68 M/UL — SIGNIFICANT CHANGE UP (ref 3.8–5.2)
RBC # BLD: 4.72 M/UL — SIGNIFICANT CHANGE UP (ref 3.8–5.2)
RBC # FLD: 13.7 % — SIGNIFICANT CHANGE UP (ref 10.3–14.5)
RBC # FLD: 13.8 % — SIGNIFICANT CHANGE UP (ref 10.3–14.5)
RBC # FLD: 13.8 % — SIGNIFICANT CHANGE UP (ref 10.3–14.5)
RBC BLD AUTO: ABNORMAL
SAO2 % BLDA: 95 % — SIGNIFICANT CHANGE UP (ref 95–100)
SAO2 % BLDA: 95 % — SIGNIFICANT CHANGE UP (ref 95–100)
SAO2 % BLDA: 96 % — SIGNIFICANT CHANGE UP (ref 95–100)
SODIUM SERPL-SCNC: 136 MMOL/L — SIGNIFICANT CHANGE UP (ref 135–145)
SODIUM SERPL-SCNC: 136 MMOL/L — SIGNIFICANT CHANGE UP (ref 135–145)
SODIUM SERPL-SCNC: 137 MMOL/L — SIGNIFICANT CHANGE UP (ref 135–145)
WBC # BLD: 14.66 K/UL — HIGH (ref 3.8–10.5)
WBC # BLD: 19.84 K/UL — HIGH (ref 3.8–10.5)
WBC # BLD: 20.56 K/UL — HIGH (ref 3.8–10.5)
WBC # FLD AUTO: 14.66 K/UL — HIGH (ref 3.8–10.5)
WBC # FLD AUTO: 19.84 K/UL — HIGH (ref 3.8–10.5)
WBC # FLD AUTO: 20.56 K/UL — HIGH (ref 3.8–10.5)

## 2019-09-27 PROCEDURE — 93010 ELECTROCARDIOGRAM REPORT: CPT

## 2019-09-27 PROCEDURE — 33533 CABG ARTERIAL SINGLE: CPT

## 2019-09-27 PROCEDURE — 99292 CRITICAL CARE ADDL 30 MIN: CPT

## 2019-09-27 PROCEDURE — 99232 SBSQ HOSP IP/OBS MODERATE 35: CPT

## 2019-09-27 PROCEDURE — 71045 X-RAY EXAM CHEST 1 VIEW: CPT | Mod: 26

## 2019-09-27 PROCEDURE — 76998 US GUIDE INTRAOP: CPT | Mod: 26,59

## 2019-09-27 PROCEDURE — 99291 CRITICAL CARE FIRST HOUR: CPT

## 2019-09-27 RX ORDER — CEFAZOLIN SODIUM 1 G
2000 VIAL (EA) INJECTION EVERY 8 HOURS
Refills: 0 | Status: DISCONTINUED | OUTPATIENT
Start: 2019-09-27 | End: 2019-09-27

## 2019-09-27 RX ORDER — INSULIN HUMAN 100 [IU]/ML
1 INJECTION, SOLUTION SUBCUTANEOUS
Qty: 100 | Refills: 0 | Status: DISCONTINUED | OUTPATIENT
Start: 2019-09-27 | End: 2019-09-28

## 2019-09-27 RX ORDER — ACETAMINOPHEN 500 MG
650 TABLET ORAL EVERY 6 HOURS
Refills: 0 | Status: DISCONTINUED | OUTPATIENT
Start: 2019-09-27 | End: 2019-10-03

## 2019-09-27 RX ORDER — NICARDIPINE HYDROCHLORIDE 30 MG/1
5 CAPSULE, EXTENDED RELEASE ORAL
Qty: 40 | Refills: 0 | Status: DISCONTINUED | OUTPATIENT
Start: 2019-09-27 | End: 2019-09-29

## 2019-09-27 RX ORDER — METOPROLOL TARTRATE 50 MG
12.5 TABLET ORAL EVERY 12 HOURS
Refills: 0 | Status: DISCONTINUED | OUTPATIENT
Start: 2019-09-27 | End: 2019-09-29

## 2019-09-27 RX ORDER — SODIUM CHLORIDE 9 MG/ML
1000 INJECTION INTRAMUSCULAR; INTRAVENOUS; SUBCUTANEOUS
Refills: 0 | Status: DISCONTINUED | OUTPATIENT
Start: 2019-09-27 | End: 2019-10-03

## 2019-09-27 RX ORDER — POLYETHYLENE GLYCOL 3350 17 G/17G
17 POWDER, FOR SOLUTION ORAL DAILY
Refills: 0 | Status: DISCONTINUED | OUTPATIENT
Start: 2019-09-27 | End: 2019-10-03

## 2019-09-27 RX ORDER — CLEVIDIPINE BUTYRATE 50MG/100ML
2 VIAL (ML) INTRAVENOUS
Qty: 25 | Refills: 0 | Status: DISCONTINUED | OUTPATIENT
Start: 2019-09-27 | End: 2019-09-27

## 2019-09-27 RX ORDER — CHLORHEXIDINE GLUCONATE 213 G/1000ML
15 SOLUTION TOPICAL EVERY 12 HOURS
Refills: 0 | Status: DISCONTINUED | OUTPATIENT
Start: 2019-09-27 | End: 2019-09-27

## 2019-09-27 RX ORDER — SENNA PLUS 8.6 MG/1
2 TABLET ORAL AT BEDTIME
Refills: 0 | Status: DISCONTINUED | OUTPATIENT
Start: 2019-09-27 | End: 2019-10-03

## 2019-09-27 RX ORDER — OXYCODONE AND ACETAMINOPHEN 5; 325 MG/1; MG/1
2 TABLET ORAL EVERY 6 HOURS
Refills: 0 | Status: DISCONTINUED | OUTPATIENT
Start: 2019-09-27 | End: 2019-10-03

## 2019-09-27 RX ORDER — OXYCODONE AND ACETAMINOPHEN 5; 325 MG/1; MG/1
1 TABLET ORAL EVERY 6 HOURS
Refills: 0 | Status: DISCONTINUED | OUTPATIENT
Start: 2019-09-27 | End: 2019-10-03

## 2019-09-27 RX ORDER — CLOPIDOGREL BISULFATE 75 MG/1
75 TABLET, FILM COATED ORAL DAILY
Refills: 0 | Status: DISCONTINUED | OUTPATIENT
Start: 2019-09-27 | End: 2019-10-03

## 2019-09-27 RX ORDER — FENTANYL CITRATE 50 UG/ML
25 INJECTION INTRAVENOUS
Refills: 0 | Status: DISCONTINUED | OUTPATIENT
Start: 2019-09-27 | End: 2019-09-27

## 2019-09-27 RX ORDER — MEPERIDINE HYDROCHLORIDE 50 MG/ML
25 INJECTION INTRAMUSCULAR; INTRAVENOUS; SUBCUTANEOUS ONCE
Refills: 0 | Status: DISCONTINUED | OUTPATIENT
Start: 2019-09-27 | End: 2019-09-27

## 2019-09-27 RX ORDER — PANTOPRAZOLE SODIUM 20 MG/1
40 TABLET, DELAYED RELEASE ORAL DAILY
Refills: 0 | Status: DISCONTINUED | OUTPATIENT
Start: 2019-09-27 | End: 2019-09-27

## 2019-09-27 RX ORDER — KETOROLAC TROMETHAMINE 30 MG/ML
15 SYRINGE (ML) INJECTION EVERY 6 HOURS
Refills: 0 | Status: DISCONTINUED | OUTPATIENT
Start: 2019-09-27 | End: 2019-09-28

## 2019-09-27 RX ORDER — SODIUM CHLORIDE 9 MG/ML
1000 INJECTION, SOLUTION INTRAVENOUS ONCE
Refills: 0 | Status: COMPLETED | OUTPATIENT
Start: 2019-09-27 | End: 2019-09-27

## 2019-09-27 RX ORDER — KETOROLAC TROMETHAMINE 30 MG/ML
30 SYRINGE (ML) INJECTION ONCE
Refills: 0 | Status: DISCONTINUED | OUTPATIENT
Start: 2019-09-27 | End: 2019-09-27

## 2019-09-27 RX ORDER — CEFAZOLIN SODIUM 1 G
2000 VIAL (EA) INJECTION EVERY 8 HOURS
Refills: 0 | Status: COMPLETED | OUTPATIENT
Start: 2019-09-27 | End: 2019-09-28

## 2019-09-27 RX ORDER — MAGNESIUM SULFATE 500 MG/ML
2 VIAL (ML) INJECTION
Refills: 0 | Status: COMPLETED | OUTPATIENT
Start: 2019-09-27 | End: 2019-09-27

## 2019-09-27 RX ORDER — DOCUSATE SODIUM 100 MG
100 CAPSULE ORAL THREE TIMES A DAY
Refills: 0 | Status: DISCONTINUED | OUTPATIENT
Start: 2019-09-27 | End: 2019-10-03

## 2019-09-27 RX ORDER — CHLORHEXIDINE GLUCONATE 213 G/1000ML
1 SOLUTION TOPICAL
Refills: 0 | Status: DISCONTINUED | OUTPATIENT
Start: 2019-09-27 | End: 2019-10-02

## 2019-09-27 RX ORDER — CHLORHEXIDINE GLUCONATE 213 G/1000ML
5 SOLUTION TOPICAL EVERY 4 HOURS
Refills: 0 | Status: DISCONTINUED | OUTPATIENT
Start: 2019-09-27 | End: 2019-09-27

## 2019-09-27 RX ORDER — FENTANYL CITRATE 50 UG/ML
12.5 INJECTION INTRAVENOUS ONCE
Refills: 0 | Status: DISCONTINUED | OUTPATIENT
Start: 2019-09-27 | End: 2019-09-27

## 2019-09-27 RX ORDER — POTASSIUM CHLORIDE 20 MEQ
20 PACKET (EA) ORAL
Refills: 0 | Status: COMPLETED | OUTPATIENT
Start: 2019-09-27 | End: 2019-09-27

## 2019-09-27 RX ORDER — BUPIVACAINE 13.3 MG/ML
20 INJECTION, SUSPENSION, LIPOSOMAL INFILTRATION ONCE
Refills: 0 | Status: DISCONTINUED | OUTPATIENT
Start: 2019-09-27 | End: 2019-09-27

## 2019-09-27 RX ORDER — PANTOPRAZOLE SODIUM 20 MG/1
40 TABLET, DELAYED RELEASE ORAL
Refills: 0 | Status: DISCONTINUED | OUTPATIENT
Start: 2019-09-27 | End: 2019-10-03

## 2019-09-27 RX ADMIN — Medication 6: at 06:34

## 2019-09-27 RX ADMIN — Medication 100 MILLIGRAM(S): at 21:45

## 2019-09-27 RX ADMIN — Medication 100 MILLIEQUIVALENT(S): at 17:50

## 2019-09-27 RX ADMIN — Medication 100 MILLIEQUIVALENT(S): at 19:01

## 2019-09-27 RX ADMIN — SODIUM CHLORIDE 2000 MILLILITER(S): 9 INJECTION, SOLUTION INTRAVENOUS at 18:00

## 2019-09-27 RX ADMIN — Medication 100 MILLIEQUIVALENT(S): at 20:19

## 2019-09-27 RX ADMIN — SODIUM CHLORIDE 3 MILLILITER(S): 9 INJECTION INTRAMUSCULAR; INTRAVENOUS; SUBCUTANEOUS at 21:46

## 2019-09-27 RX ADMIN — FENTANYL CITRATE 25 MICROGRAM(S): 50 INJECTION INTRAVENOUS at 15:26

## 2019-09-27 RX ADMIN — Medication 50 GRAM(S): at 13:42

## 2019-09-27 RX ADMIN — NICARDIPINE HYDROCHLORIDE 25 MG/HR: 30 CAPSULE, EXTENDED RELEASE ORAL at 19:19

## 2019-09-27 RX ADMIN — PANTOPRAZOLE SODIUM 40 MILLIGRAM(S): 20 TABLET, DELAYED RELEASE ORAL at 14:25

## 2019-09-27 RX ADMIN — Medication 100 MILLIEQUIVALENT(S): at 17:51

## 2019-09-27 RX ADMIN — Medication 12.5 MILLIGRAM(S): at 06:24

## 2019-09-27 RX ADMIN — Medication 2000 MILLIGRAM(S): at 14:26

## 2019-09-27 RX ADMIN — Medication 50 GRAM(S): at 14:13

## 2019-09-27 RX ADMIN — SODIUM CHLORIDE 3 MILLILITER(S): 9 INJECTION INTRAMUSCULAR; INTRAVENOUS; SUBCUTANEOUS at 06:19

## 2019-09-27 RX ADMIN — FENTANYL CITRATE 12.5 MICROGRAM(S): 50 INJECTION INTRAVENOUS at 22:09

## 2019-09-27 RX ADMIN — Medication 30 MILLIGRAM(S): at 19:01

## 2019-09-27 RX ADMIN — Medication 30 MILLIGRAM(S): at 20:28

## 2019-09-27 RX ADMIN — CHLORHEXIDINE GLUCONATE 1 APPLICATION(S): 213 SOLUTION TOPICAL at 05:00

## 2019-09-27 RX ADMIN — FENTANYL CITRATE 25 MICROGRAM(S): 50 INJECTION INTRAVENOUS at 14:22

## 2019-09-27 RX ADMIN — FENTANYL CITRATE 12.5 MICROGRAM(S): 50 INJECTION INTRAVENOUS at 21:45

## 2019-09-27 RX ADMIN — SODIUM CHLORIDE 3 MILLILITER(S): 9 INJECTION INTRAMUSCULAR; INTRAVENOUS; SUBCUTANEOUS at 13:43

## 2019-09-27 RX ADMIN — Medication 2000 MILLIGRAM(S): at 21:45

## 2019-09-27 RX ADMIN — SIMVASTATIN 20 MILLIGRAM(S): 20 TABLET, FILM COATED ORAL at 21:45

## 2019-09-27 RX ADMIN — CHLORHEXIDINE GLUCONATE 5 MILLILITER(S): 213 SOLUTION TOPICAL at 06:25

## 2019-09-27 RX ADMIN — Medication 15 MILLIGRAM(S): at 23:46

## 2019-09-27 RX ADMIN — Medication 12.5 MILLIGRAM(S): at 19:17

## 2019-09-27 NOTE — BRIEF OPERATIVE NOTE - NSICDXBRIEFPROCEDURE_GEN_ALL_CORE_FT
PROCEDURES:  Minimally invasive direct coronary artery bypass (MIDCAB) with transesophageal echocardiography (GERMAN) 27-Sep-2019 12:26:46 ROJAS to LAD [place], Alvina

## 2019-09-27 NOTE — PROGRESS NOTE ADULT - SUBJECTIVE AND OBJECTIVE BOX
CTICU  CRITICAL  CARE  attending     Hand off received 					   Pertinent clinical, laboratory, radiographic, hemodynamic, echocardiographic, respiratory data, microbiologic data and chart were reviewed and analyzed frequently throughout the course of the day and night  Patient seen and examined with CTS/ SH attending at bedside    Pt is a 59y , Female, post op day s/p CABG x 1; robotic assisted midCAB      post op:    intubated/sedated  weaned and extubated  volume resuscitation for lactic acidosis        , FAMILY HISTORY:  Family history of early CAD: Brother s/p PCI at age 43  Sister s/p PCI at age 53 + 58  PAST MEDICAL & SURGICAL HISTORY:  Diabetes mellitus  Hyperlipemia  Hypertension  History of cardiac cath: 2013 ([facility])  H/O carotid endarterectomy: R side - Sptember 2018    Patient is a 59y old  Female who presents with a chief complaint of SOB with abnormal nuclear stress test (26 Sep 2019 19:15)      14 system review was unremarkable    Vital signs, hemodynamic and respiratory parameters were reviewed from the bedside nursing flowsheet.  ICU Vital Signs Last 24 Hrs  T(C): 36.9 (27 Sep 2019 14:00), Max: 36.9 (26 Sep 2019 17:29)  T(F): 98.4 (27 Sep 2019 14:00), Max: 98.4 (26 Sep 2019 17:29)  HR: 110 (27 Sep 2019 16:30) (84 - 114)  BP: 141/81 (27 Sep 2019 04:10) (139/72 - 151/83)  BP(mean): 105 (27 Sep 2019 04:10) (98 - 118)  ABP: 130/52 (27 Sep 2019 16:30) (110/46 - 154/74)  ABP(mean): 80 (27 Sep 2019 16:30) (66 - 106)  RR: 19 (27 Sep 2019 16:30) (12 - 22)  SpO2: 97% (27 Sep 2019 16:30) (95% - 100%)    Adult Advanced Hemodynamics Last 24 Hrs  CVP(mm Hg): 2 (27 Sep 2019 16:30) (2 - 9)  CVP(cm H2O): --  CO: --  CI: --  PA: --  PA(mean): --  PCWP: --  SVR: --  SVRI: --  PVR: --  PVRI: --, ABG - ( 27 Sep 2019 16:39 )  pH, Arterial: 7.37  pH, Blood: x     /  pCO2: 39    /  pO2: 90    / HCO3: 22    / Base Excess: -3.0  /  SaO2: 96                Mode: AC/ CMV (Assist Control/ Continuous Mandatory Ventilation)  RR (machine): 22  TV (machine): 500  FiO2: 50  PEEP: 5  ITime: 1  MAP: 12  PIP: 25    Intake and output was reviewed and the fluid balance was calculated  Daily Height in cm: 165.1 (26 Sep 2019 19:44)    Daily   I&O's Summary    26 Sep 2019 07:01  -  27 Sep 2019 07:00  --------------------------------------------------------  IN: 0 mL / OUT: 450 mL / NET: -450 mL    27 Sep 2019 07:01  -  27 Sep 2019 16:55  --------------------------------------------------------  IN: 1429 mL / OUT: 440 mL / NET: 989 mL        All lines and drain sites were assessed  [facility]      POCT Blood Glucose.: 262 mg/dL (27 Sep 2019 16:33)    No acute change in mental status  Auscultation of the chest reveals equal bs  Abdomen is soft  Extremities are warm and well perfused  Wounds appear clean and unremarkable  Antibiotics are periop    labs  CBC Full  -  ( 27 Sep 2019 16:37 )  WBC Count : 19.84 K/uL  RBC Count : 4.72 M/uL  Hemoglobin : 11.0 g/dL  Hematocrit : 35.1 %  Platelet Count - Automated : 293 K/uL  Mean Cell Volume : 74.4 fl  Mean Cell Hemoglobin : 23.3 pg  Mean Cell Hemoglobin Concentration : 31.3 gm/dL  Auto Neutrophil # : x  Auto Lymphocyte # : x  Auto Monocyte # : x  Auto Eosinophil # : x  Auto Basophil # : x  Auto Neutrophil % : x  Auto Lymphocyte % : x  Auto Monocyte % : x  Auto Eosinophil % : x  Auto Basophil % : x    09-27    137  |  101  |  12  ----------------------------<  248<H>  4.7   |  26  |  0.51    Ca    8.4      27 Sep 2019 12:35  Phos  3.6     09-27  Mg     1.4     09-27    TPro  6.1  /  Alb  3.3  /  TBili  0.3  /  DBili  x   /  AST  40  /  ALT  20  /  AlkPhos  60  09-27    PT/INR - ( 27 Sep 2019 16:37 )   PT: 12.2 sec;   INR: 1.08          PTT - ( 27 Sep 2019 16:37 )  PTT:28.8 sec  The current medications were reviewed   MEDICATIONS  (STANDING):  aspirin  chewable 81 milliGRAM(s) Oral daily  ceFAZolin  Injectable. 2000 milliGRAM(s) IV Push every 8 hours  chlorhexidine 2% Cloths 1 Application(s) Topical <User Schedule>  clevidipine Infusion 2 mG/Hr (4 mL/Hr) IV Continuous <Continuous>  clopidogrel Tablet 75 milliGRAM(s) Oral daily  docusate sodium 100 milliGRAM(s) Oral three times a day  escitalopram 5 milliGRAM(s) Oral daily  heparin  Injectable 5000 Unit(s) SubCutaneous every 8 hours  insulin regular Infusion 1 Unit(s)/Hr (1 mL/Hr) IV Continuous <Continuous>  lactated ringers Bolus 1000 milliLiter(s) IV Bolus once  meclizine 25 milliGRAM(s) Oral every 12 hours  pantoprazole    Tablet 40 milliGRAM(s) Oral before breakfast  polyethylene glycol 3350 17 Gram(s) Oral daily  senna 2 Tablet(s) Oral at bedtime  simvastatin 20 milliGRAM(s) Oral at bedtime  sodium chloride 0.9% lock flush 3 milliLiter(s) IV Push every 8 hours  sodium chloride 0.9%. 1000 milliLiter(s) (10 mL/Hr) IV Continuous <Continuous>    MEDICATIONS  (PRN):  acetaminophen   Tablet .. 650 milliGRAM(s) Oral every 6 hours PRN Temp greater or equal to 38C (100.4F), Mild Pain (1 - 3)  glucagon  Injectable 1 milliGRAM(s) IntraMuscular Once PRN Glucose LESS THAN 70 milligrams/deciliter  ketorolac   Injectable 15 milliGRAM(s) IV Push every 6 hours PRN Moderate Pain (4 - 6)       PROBLEM LIST/ ASSESSMENT:  HEALTH ISSUES - PROBLEM Dx:    lactic acidosis  s/p CABG    ,   Patient is a 59y old  Female who presents with a chief complaint of SOB with abnormal nuclear stress test (26 Sep 2019 19:15)     s/p CABG      My plan includes :  close hemodynamic, ventilatory and drain monitoring and management per post op routine    Monitor for arrhythmias and monitor parameters for organ perfusion  monitor neurologic status  Head of the bed should remain elevated to 45 deg .   chest PT and IS will be encouraged  monitor adequacy of oxygenation and ventilation and attempt to wean oxygen  Nutritional goals will be met using po eventually , ensure adequate caloric intake and montior the same  Stress ulcer and VTE prophylaxis will be achieved    Glycemic control is satisfactory  Electrolytes have been repleted as necessary and wound care has been carried out. Pain control has been achieved.   agressive physical therapy and early mobility and ambulation goals will be met   The family was updated about the course and plan  CRITICAL CARE TIME SPENT in evaluation and management, reassessments, review and interpretation of labs and x-rays, ventilator and hemodynamic management, formulating a plan and coordinating care: ___90____ MIN.  Time does not include procedural time.  CTICU ATTENDING     					    Pieter Anderson MD

## 2019-09-28 LAB
ALBUMIN SERPL ELPH-MCNC: 2.9 G/DL — LOW (ref 3.3–5)
ALP SERPL-CCNC: 49 U/L — SIGNIFICANT CHANGE UP (ref 40–120)
ALT FLD-CCNC: 15 U/L — SIGNIFICANT CHANGE UP (ref 10–45)
ANION GAP SERPL CALC-SCNC: 10 MMOL/L — SIGNIFICANT CHANGE UP (ref 5–17)
ANION GAP SERPL CALC-SCNC: 8 MMOL/L — SIGNIFICANT CHANGE UP (ref 5–17)
APTT BLD: 26.8 SEC — LOW (ref 27.5–36.3)
APTT BLD: 29.5 SEC — SIGNIFICANT CHANGE UP (ref 27.5–36.3)
AST SERPL-CCNC: 18 U/L — SIGNIFICANT CHANGE UP (ref 10–40)
BILIRUB SERPL-MCNC: 0.3 MG/DL — SIGNIFICANT CHANGE UP (ref 0.2–1.2)
BUN SERPL-MCNC: 10 MG/DL — SIGNIFICANT CHANGE UP (ref 7–23)
BUN SERPL-MCNC: 15 MG/DL — SIGNIFICANT CHANGE UP (ref 7–23)
CALCIUM SERPL-MCNC: 7.8 MG/DL — LOW (ref 8.4–10.5)
CALCIUM SERPL-MCNC: 8.1 MG/DL — LOW (ref 8.4–10.5)
CHLORIDE SERPL-SCNC: 103 MMOL/L — SIGNIFICANT CHANGE UP (ref 96–108)
CHLORIDE SERPL-SCNC: 106 MMOL/L — SIGNIFICANT CHANGE UP (ref 96–108)
CO2 SERPL-SCNC: 23 MMOL/L — SIGNIFICANT CHANGE UP (ref 22–31)
CO2 SERPL-SCNC: 23 MMOL/L — SIGNIFICANT CHANGE UP (ref 22–31)
CREAT SERPL-MCNC: 0.52 MG/DL — SIGNIFICANT CHANGE UP (ref 0.5–1.3)
CREAT SERPL-MCNC: 0.64 MG/DL — SIGNIFICANT CHANGE UP (ref 0.5–1.3)
GAS PNL BLDA: SIGNIFICANT CHANGE UP
GLUCOSE BLDC GLUCOMTR-MCNC: 101 MG/DL — HIGH (ref 70–99)
GLUCOSE BLDC GLUCOMTR-MCNC: 105 MG/DL — HIGH (ref 70–99)
GLUCOSE BLDC GLUCOMTR-MCNC: 127 MG/DL — HIGH (ref 70–99)
GLUCOSE BLDC GLUCOMTR-MCNC: 134 MG/DL — HIGH (ref 70–99)
GLUCOSE BLDC GLUCOMTR-MCNC: 173 MG/DL — HIGH (ref 70–99)
GLUCOSE BLDC GLUCOMTR-MCNC: 195 MG/DL — HIGH (ref 70–99)
GLUCOSE BLDC GLUCOMTR-MCNC: 222 MG/DL — HIGH (ref 70–99)
GLUCOSE BLDC GLUCOMTR-MCNC: 337 MG/DL — HIGH (ref 70–99)
GLUCOSE SERPL-MCNC: 121 MG/DL — HIGH (ref 70–99)
GLUCOSE SERPL-MCNC: 346 MG/DL — HIGH (ref 70–99)
HCT VFR BLD CALC: 30.8 % — LOW (ref 34.5–45)
HCT VFR BLD CALC: 32.1 % — LOW (ref 34.5–45)
HGB BLD-MCNC: 10.2 G/DL — LOW (ref 11.5–15.5)
HGB BLD-MCNC: 9.5 G/DL — LOW (ref 11.5–15.5)
INR BLD: 1.14 — SIGNIFICANT CHANGE UP (ref 0.88–1.16)
INR BLD: 1.16 — SIGNIFICANT CHANGE UP (ref 0.88–1.16)
LACTATE SERPL-SCNC: 1.1 MMOL/L — SIGNIFICANT CHANGE UP (ref 0.5–2)
LACTATE SERPL-SCNC: 1.2 MMOL/L — SIGNIFICANT CHANGE UP (ref 0.5–2)
MAGNESIUM SERPL-MCNC: 2 MG/DL — SIGNIFICANT CHANGE UP (ref 1.6–2.6)
MAGNESIUM SERPL-MCNC: 2.6 MG/DL — SIGNIFICANT CHANGE UP (ref 1.6–2.6)
MCHC RBC-ENTMCNC: 23.2 PG — LOW (ref 27–34)
MCHC RBC-ENTMCNC: 23.5 PG — LOW (ref 27–34)
MCHC RBC-ENTMCNC: 30.8 GM/DL — LOW (ref 32–36)
MCHC RBC-ENTMCNC: 31.8 GM/DL — LOW (ref 32–36)
MCV RBC AUTO: 74 FL — LOW (ref 80–100)
MCV RBC AUTO: 75.3 FL — LOW (ref 80–100)
NRBC # BLD: 0 /100 WBCS — SIGNIFICANT CHANGE UP (ref 0–0)
NRBC # BLD: 0 /100 WBCS — SIGNIFICANT CHANGE UP (ref 0–0)
PHOSPHATE SERPL-MCNC: 3.6 MG/DL — SIGNIFICANT CHANGE UP (ref 2.5–4.5)
PLATELET # BLD AUTO: 231 K/UL — SIGNIFICANT CHANGE UP (ref 150–400)
PLATELET # BLD AUTO: 264 K/UL — SIGNIFICANT CHANGE UP (ref 150–400)
POTASSIUM SERPL-MCNC: 4.3 MMOL/L — SIGNIFICANT CHANGE UP (ref 3.5–5.3)
POTASSIUM SERPL-MCNC: 4.3 MMOL/L — SIGNIFICANT CHANGE UP (ref 3.5–5.3)
POTASSIUM SERPL-SCNC: 4.3 MMOL/L — SIGNIFICANT CHANGE UP (ref 3.5–5.3)
POTASSIUM SERPL-SCNC: 4.3 MMOL/L — SIGNIFICANT CHANGE UP (ref 3.5–5.3)
PROT SERPL-MCNC: 5.7 G/DL — LOW (ref 6–8.3)
PROTHROM AB SERPL-ACNC: 12.9 SEC — SIGNIFICANT CHANGE UP (ref 10–12.9)
PROTHROM AB SERPL-ACNC: 13.2 SEC — HIGH (ref 10–12.9)
RBC # BLD: 4.09 M/UL — SIGNIFICANT CHANGE UP (ref 3.8–5.2)
RBC # BLD: 4.34 M/UL — SIGNIFICANT CHANGE UP (ref 3.8–5.2)
RBC # FLD: 13.9 % — SIGNIFICANT CHANGE UP (ref 10.3–14.5)
RBC # FLD: 14.4 % — SIGNIFICANT CHANGE UP (ref 10.3–14.5)
SODIUM SERPL-SCNC: 136 MMOL/L — SIGNIFICANT CHANGE UP (ref 135–145)
SODIUM SERPL-SCNC: 137 MMOL/L — SIGNIFICANT CHANGE UP (ref 135–145)
WBC # BLD: 11.39 K/UL — HIGH (ref 3.8–10.5)
WBC # BLD: 15.46 K/UL — HIGH (ref 3.8–10.5)
WBC # FLD AUTO: 11.39 K/UL — HIGH (ref 3.8–10.5)
WBC # FLD AUTO: 15.46 K/UL — HIGH (ref 3.8–10.5)

## 2019-09-28 PROCEDURE — 99291 CRITICAL CARE FIRST HOUR: CPT

## 2019-09-28 PROCEDURE — 71045 X-RAY EXAM CHEST 1 VIEW: CPT | Mod: 26,76

## 2019-09-28 RX ORDER — GLUCAGON INJECTION, SOLUTION 0.5 MG/.1ML
1 INJECTION, SOLUTION SUBCUTANEOUS ONCE
Refills: 0 | Status: DISCONTINUED | OUTPATIENT
Start: 2019-09-28 | End: 2019-10-03

## 2019-09-28 RX ORDER — LIDOCAINE 4 G/100G
1 CREAM TOPICAL EVERY 24 HOURS
Refills: 0 | Status: DISCONTINUED | OUTPATIENT
Start: 2019-09-28 | End: 2019-10-03

## 2019-09-28 RX ORDER — POTASSIUM CHLORIDE 20 MEQ
20 PACKET (EA) ORAL ONCE
Refills: 0 | Status: COMPLETED | OUTPATIENT
Start: 2019-09-28 | End: 2019-09-28

## 2019-09-28 RX ORDER — DEXTROSE 50 % IN WATER 50 %
25 SYRINGE (ML) INTRAVENOUS ONCE
Refills: 0 | Status: DISCONTINUED | OUTPATIENT
Start: 2019-09-28 | End: 2019-10-03

## 2019-09-28 RX ORDER — FUROSEMIDE 40 MG
20 TABLET ORAL ONCE
Refills: 0 | Status: COMPLETED | OUTPATIENT
Start: 2019-09-28 | End: 2019-09-28

## 2019-09-28 RX ORDER — KETOROLAC TROMETHAMINE 30 MG/ML
30 SYRINGE (ML) INJECTION EVERY 6 HOURS
Refills: 0 | Status: DISCONTINUED | OUTPATIENT
Start: 2019-09-28 | End: 2019-09-29

## 2019-09-28 RX ORDER — DEXTROSE 50 % IN WATER 50 %
15 SYRINGE (ML) INTRAVENOUS ONCE
Refills: 0 | Status: DISCONTINUED | OUTPATIENT
Start: 2019-09-28 | End: 2019-10-03

## 2019-09-28 RX ORDER — INSULIN LISPRO 100/ML
VIAL (ML) SUBCUTANEOUS
Refills: 0 | Status: DISCONTINUED | OUTPATIENT
Start: 2019-09-28 | End: 2019-09-28

## 2019-09-28 RX ORDER — INSULIN LISPRO 100/ML
3 VIAL (ML) SUBCUTANEOUS
Refills: 0 | Status: DISCONTINUED | OUTPATIENT
Start: 2019-09-28 | End: 2019-09-29

## 2019-09-28 RX ORDER — HUMAN INSULIN 100 [IU]/ML
7 INJECTION, SUSPENSION SUBCUTANEOUS ONCE
Refills: 0 | Status: COMPLETED | OUTPATIENT
Start: 2019-09-28 | End: 2019-09-28

## 2019-09-28 RX ORDER — SODIUM CHLORIDE 9 MG/ML
1000 INJECTION, SOLUTION INTRAVENOUS
Refills: 0 | Status: DISCONTINUED | OUTPATIENT
Start: 2019-09-28 | End: 2019-10-03

## 2019-09-28 RX ORDER — SODIUM CHLORIDE 9 MG/ML
500 INJECTION, SOLUTION INTRAVENOUS ONCE
Refills: 0 | Status: COMPLETED | OUTPATIENT
Start: 2019-09-28 | End: 2019-09-28

## 2019-09-28 RX ORDER — INSULIN LISPRO 100/ML
VIAL (ML) SUBCUTANEOUS
Refills: 0 | Status: DISCONTINUED | OUTPATIENT
Start: 2019-09-28 | End: 2019-10-03

## 2019-09-28 RX ORDER — DEXTROSE 50 % IN WATER 50 %
12.5 SYRINGE (ML) INTRAVENOUS ONCE
Refills: 0 | Status: DISCONTINUED | OUTPATIENT
Start: 2019-09-28 | End: 2019-10-03

## 2019-09-28 RX ORDER — INSULIN GLARGINE 100 [IU]/ML
14 INJECTION, SOLUTION SUBCUTANEOUS AT BEDTIME
Refills: 0 | Status: DISCONTINUED | OUTPATIENT
Start: 2019-09-28 | End: 2019-09-29

## 2019-09-28 RX ORDER — MAGNESIUM SULFATE 500 MG/ML
2 VIAL (ML) INJECTION ONCE
Refills: 0 | Status: COMPLETED | OUTPATIENT
Start: 2019-09-28 | End: 2019-09-28

## 2019-09-28 RX ORDER — METOPROLOL TARTRATE 50 MG
25 TABLET ORAL EVERY 12 HOURS
Refills: 0 | Status: DISCONTINUED | OUTPATIENT
Start: 2019-09-28 | End: 2019-09-29

## 2019-09-28 RX ADMIN — HUMAN INSULIN 7 UNIT(S): 100 INJECTION, SUSPENSION SUBCUTANEOUS at 11:15

## 2019-09-28 RX ADMIN — Medication 15 MILLIGRAM(S): at 16:04

## 2019-09-28 RX ADMIN — Medication 81 MILLIGRAM(S): at 11:16

## 2019-09-28 RX ADMIN — Medication 100 MILLIGRAM(S): at 06:04

## 2019-09-28 RX ADMIN — Medication 25 MILLIGRAM(S): at 17:15

## 2019-09-28 RX ADMIN — SODIUM CHLORIDE 3 MILLILITER(S): 9 INJECTION INTRAMUSCULAR; INTRAVENOUS; SUBCUTANEOUS at 14:55

## 2019-09-28 RX ADMIN — SIMVASTATIN 20 MILLIGRAM(S): 20 TABLET, FILM COATED ORAL at 21:35

## 2019-09-28 RX ADMIN — SENNA PLUS 2 TABLET(S): 8.6 TABLET ORAL at 21:35

## 2019-09-28 RX ADMIN — Medication 3 UNIT(S): at 17:14

## 2019-09-28 RX ADMIN — Medication 100 MILLIGRAM(S): at 14:59

## 2019-09-28 RX ADMIN — ESCITALOPRAM OXALATE 5 MILLIGRAM(S): 10 TABLET, FILM COATED ORAL at 11:16

## 2019-09-28 RX ADMIN — Medication 2000 MILLIGRAM(S): at 21:35

## 2019-09-28 RX ADMIN — Medication 100 MILLIGRAM(S): at 21:35

## 2019-09-28 RX ADMIN — OXYCODONE AND ACETAMINOPHEN 1 TABLET(S): 5; 325 TABLET ORAL at 19:12

## 2019-09-28 RX ADMIN — Medication 650 MILLIGRAM(S): at 11:45

## 2019-09-28 RX ADMIN — SODIUM CHLORIDE 1000 MILLILITER(S): 9 INJECTION, SOLUTION INTRAVENOUS at 01:00

## 2019-09-28 RX ADMIN — HEPARIN SODIUM 5000 UNIT(S): 5000 INJECTION INTRAVENOUS; SUBCUTANEOUS at 14:59

## 2019-09-28 RX ADMIN — POLYETHYLENE GLYCOL 3350 17 GRAM(S): 17 POWDER, FOR SOLUTION ORAL at 11:19

## 2019-09-28 RX ADMIN — Medication 20 MILLIGRAM(S): at 08:02

## 2019-09-28 RX ADMIN — Medication 15 MILLIGRAM(S): at 00:30

## 2019-09-28 RX ADMIN — Medication 2000 MILLIGRAM(S): at 06:03

## 2019-09-28 RX ADMIN — Medication 100 MILLIEQUIVALENT(S): at 07:53

## 2019-09-28 RX ADMIN — Medication 25 MILLIGRAM(S): at 18:37

## 2019-09-28 RX ADMIN — LIDOCAINE 1 PATCH: 4 CREAM TOPICAL at 23:14

## 2019-09-28 RX ADMIN — OXYCODONE AND ACETAMINOPHEN 1 TABLET(S): 5; 325 TABLET ORAL at 20:10

## 2019-09-28 RX ADMIN — CLOPIDOGREL BISULFATE 75 MILLIGRAM(S): 75 TABLET, FILM COATED ORAL at 11:17

## 2019-09-28 RX ADMIN — Medication 25 MILLIGRAM(S): at 08:08

## 2019-09-28 RX ADMIN — Medication 15 MILLIGRAM(S): at 07:00

## 2019-09-28 RX ADMIN — Medication 2000 MILLIGRAM(S): at 14:59

## 2019-09-28 RX ADMIN — Medication 3 UNIT(S): at 11:19

## 2019-09-28 RX ADMIN — Medication 50 GRAM(S): at 08:01

## 2019-09-28 RX ADMIN — Medication 15 MILLIGRAM(S): at 06:07

## 2019-09-28 RX ADMIN — Medication 2: at 22:04

## 2019-09-28 RX ADMIN — HEPARIN SODIUM 5000 UNIT(S): 5000 INJECTION INTRAVENOUS; SUBCUTANEOUS at 21:35

## 2019-09-28 RX ADMIN — Medication 650 MILLIGRAM(S): at 21:35

## 2019-09-28 RX ADMIN — SODIUM CHLORIDE 3 MILLILITER(S): 9 INJECTION INTRAMUSCULAR; INTRAVENOUS; SUBCUTANEOUS at 21:36

## 2019-09-28 RX ADMIN — Medication 15 MILLIGRAM(S): at 16:20

## 2019-09-28 RX ADMIN — HEPARIN SODIUM 5000 UNIT(S): 5000 INJECTION INTRAVENOUS; SUBCUTANEOUS at 06:03

## 2019-09-28 RX ADMIN — Medication 650 MILLIGRAM(S): at 22:33

## 2019-09-28 RX ADMIN — CHLORHEXIDINE GLUCONATE 1 APPLICATION(S): 213 SOLUTION TOPICAL at 07:58

## 2019-09-28 RX ADMIN — INSULIN GLARGINE 14 UNIT(S): 100 INJECTION, SOLUTION SUBCUTANEOUS at 22:05

## 2019-09-28 RX ADMIN — Medication 4: at 17:14

## 2019-09-28 RX ADMIN — Medication 12.5 MILLIGRAM(S): at 06:03

## 2019-09-28 RX ADMIN — Medication 650 MILLIGRAM(S): at 12:44

## 2019-09-28 RX ADMIN — Medication 2: at 11:19

## 2019-09-28 RX ADMIN — PANTOPRAZOLE SODIUM 40 MILLIGRAM(S): 20 TABLET, DELAYED RELEASE ORAL at 06:22

## 2019-09-28 RX ADMIN — Medication 50 GRAM(S): at 03:30

## 2019-09-28 RX ADMIN — SODIUM CHLORIDE 3 MILLILITER(S): 9 INJECTION INTRAMUSCULAR; INTRAVENOUS; SUBCUTANEOUS at 06:05

## 2019-09-28 NOTE — PROGRESS NOTE ADULT - SUBJECTIVE AND OBJECTIVE BOX
CTICU  CRITICAL  CARE  attending     Hand off received 					   Pertinent clinical, laboratory, radiographic, hemodynamic, echocardiographic, respiratory data, microbiologic data and chart were reviewed and analyzed frequently throughout the course of the day and night  Patient seen and examined with CTS/ SH attending at bedside  Pt is a 59y , Female, HEALTH ISSUES - PROBLEM Dx:      , FAMILY HISTORY:  Family history of early CAD: Brother s/p PCI at age 43  Sister s/p PCI at age 53 + 58  PAST MEDICAL & SURGICAL HISTORY:  Diabetes mellitus  Hyperlipemia  Hypertension  History of cardiac cath: 2013 ([facility])  H/O carotid endarterectomy: R side - Sptember 2018    Patient is a 59y old  Female who presents with a chief complaint of SOB with abnormal nuclear stress test (28 Sep 2019 13:34)      14 system review was unremarkable    Vital signs, hemodynamic and respiratory parameters were reviewed from the bedside nursing flowsheet.  ICU Vital Signs Last 24 Hrs  T(C): 37.3 (28 Sep 2019 21:18), Max: 37.3 (28 Sep 2019 21:18)  T(F): 99.2 (28 Sep 2019 21:18), Max: 99.2 (28 Sep 2019 21:18)  HR: 100 (28 Sep 2019 22:00) (88 - 114)  BP: 110/62 (28 Sep 2019 22:00) (92/58 - 119/73)  BP(mean): 74 (28 Sep 2019 22:00) (68 - 86)  ABP: 126/62 (28 Sep 2019 17:00) (92/46 - 136/62)  ABP(mean): 92 (28 Sep 2019 17:00) (60 - 92)  RR: 18 (28 Sep 2019 22:00) (18 - 29)  SpO2: 94% (28 Sep 2019 22:00) (93% - 98%)    Adult Advanced Hemodynamics Last 24 Hrs  CVP(mm Hg): 2 (28 Sep 2019 10:45) (2 - 12)  CVP(cm H2O): --  CO: --  CI: --  PA: --  PA(mean): --  PCWP: --  SVR: --  SVRI: --  PVR: --  PVRI: --, ABG - ( 28 Sep 2019 04:13 )  pH, Arterial: 7.45  pH, Blood: x     /  pCO2: 37    /  pO2: 81    / HCO3: 25    / Base Excess: 1.1   /  SaO2: 96                  Intake and output was reviewed and the fluid balance was calculated  Daily     Daily   I&O's Summary    27 Sep 2019 07:01  -  28 Sep 2019 07:00  --------------------------------------------------------  IN: 4311 mL / OUT: 2235 mL / NET: 2076 mL    28 Sep 2019 07:01  -  28 Sep 2019 22:45  --------------------------------------------------------  IN: 280.5 mL / OUT: 1115 mL / NET: -834.5 mL        All lines and drain sites were assessed  Glycemic trend was reviewedCAPILLARY BLOOD GLUCOSE      POCT Blood Glucose.: 195 mg/dL (28 Sep 2019 21:50)    No acute change in mental status  Auscultation of the chest reveals equal bs  Abdomen is soft  Extremities are warm and well perfused  Wounds appear clean and unremarkable  Antibiotics are periop    labs  CBC Full  -  ( 28 Sep 2019 16:02 )  WBC Count : 11.39 K/uL  RBC Count : 4.09 M/uL  Hemoglobin : 9.5 g/dL  Hematocrit : 30.8 %  Platelet Count - Automated : 231 K/uL  Mean Cell Volume : 75.3 fl  Mean Cell Hemoglobin : 23.2 pg  Mean Cell Hemoglobin Concentration : 30.8 gm/dL  Auto Neutrophil # : x  Auto Lymphocyte # : x  Auto Monocyte # : x  Auto Eosinophil # : x  Auto Basophil # : x  Auto Neutrophil % : x  Auto Lymphocyte % : x  Auto Monocyte % : x  Auto Eosinophil % : x  Auto Basophil % : x    09-28    137  |  106  |  15  ----------------------------<  346<H>  4.3   |  23  |  0.64    Ca    7.8<L>      28 Sep 2019 16:02  Phos  3.6     09-28  Mg     2.6     09-28    TPro  5.7<L>  /  Alb  2.9<L>  /  TBili  0.3  /  DBili  x   /  AST  18  /  ALT  15  /  AlkPhos  49  09-28    PT/INR - ( 28 Sep 2019 16:02 )   PT: 13.2 sec;   INR: 1.16          PTT - ( 28 Sep 2019 16:02 )  PTT:29.5 sec  The current medications were reviewed   MEDICATIONS  (STANDING):  aspirin  chewable 81 milliGRAM(s) Oral daily  chlorhexidine 2% Cloths 1 Application(s) Topical <User Schedule>  clopidogrel Tablet 75 milliGRAM(s) Oral daily  dextrose 5%. 1000 milliLiter(s) (50 mL/Hr) IV Continuous <Continuous>  dextrose 50% Injectable 12.5 Gram(s) IV Push once  dextrose 50% Injectable 25 Gram(s) IV Push once  dextrose 50% Injectable 25 Gram(s) IV Push once  docusate sodium 100 milliGRAM(s) Oral three times a day  escitalopram 5 milliGRAM(s) Oral daily  heparin  Injectable 5000 Unit(s) SubCutaneous every 8 hours  insulin glargine Injectable (LANTUS) 14 Unit(s) SubCutaneous at bedtime  insulin lispro (HumaLOG) corrective regimen sliding scale   SubCutaneous Before meals and at bedtime  insulin lispro Injectable (HumaLOG) 3 Unit(s) SubCutaneous three times a day before meals  ketorolac   Injectable 30 milliGRAM(s) IV Push every 6 hours  lidocaine   Patch 1 Patch Transdermal every 24 hours  meclizine 25 milliGRAM(s) Oral every 12 hours  metoprolol tartrate 25 milliGRAM(s) Oral every 12 hours  metoprolol tartrate 12.5 milliGRAM(s) Oral every 12 hours  niCARdipine Infusion 5 mG/Hr (25 mL/Hr) IV Continuous <Continuous>  pantoprazole    Tablet 40 milliGRAM(s) Oral before breakfast  polyethylene glycol 3350 17 Gram(s) Oral daily  senna 2 Tablet(s) Oral at bedtime  simvastatin 20 milliGRAM(s) Oral at bedtime  sodium chloride 0.9% lock flush 3 milliLiter(s) IV Push every 8 hours  sodium chloride 0.9%. 1000 milliLiter(s) (10 mL/Hr) IV Continuous <Continuous>    MEDICATIONS  (PRN):  acetaminophen   Tablet .. 650 milliGRAM(s) Oral every 6 hours PRN Temp greater or equal to 38C (100.4F), Mild Pain (1 - 3)  dextrose 40% Gel 15 Gram(s) Oral once PRN Blood Glucose LESS THAN 70 milliGRAM(s)/deciliter  glucagon  Injectable 1 milliGRAM(s) IntraMuscular once PRN Glucose LESS THAN 70 milligrams/deciliter  glucagon  Injectable 1 milliGRAM(s) IntraMuscular Once PRN Glucose LESS THAN 70 milligrams/deciliter  oxyCODONE    5 mG/acetaminophen 325 mG 1 Tablet(s) Oral every 6 hours PRN Moderate Pain (4 - 6)  oxyCODONE    5 mG/acetaminophen 325 mG 2 Tablet(s) Oral every 6 hours PRN Severe Pain (7 - 10)       PROBLEM LIST/ ASSESSMENT:  HEALTH ISSUES - PROBLEM Dx:      ,   Patient is a 59y old  Female who presents with a chief complaint of SOB with abnormal nuclear stress test (28 Sep 2019 13:34)     s/p cardiac surgery              My plan includes :  close hemodynamic, ventilatory and drain monitoring and management per post op routine    Monitor for arrhythmias and monitor parameters for organ perfusion  beta blockade not administered due to hemodynamic instability and bradycardia  monitor neurologic status  Head of the bed should remain elevated to 45 deg .   chest PT and IS will be encouraged  monitor adequacy of oxygenation and ventilation and attempt to wean oxygen  antibiotic regimen will be tailored to the clinical, laboratory and microbiologic data  Nutritional goals will be met using po eventually , ensure adequate caloric intake and montior the same  Stress ulcer and VTE prophylaxis will be achieved    Glycemic control is satisfactory  Electrolytes have been repleted as necessary and wound care has been carried out. Pain control has been achieved.   agressive physical therapy and early mobility and ambulation goals will be met   The family was updated about the course and plan  CRITICAL CARE TIME personally provided by me  in evaluation and management, reassessments, review and interpretation of labs and x-rays, ventilator and hemodynamic management, formulating a plan and coordinating care: ___90____ MIN.  Time does not include procedural time.  CTICU ATTENDING     					    Garth Peterson MD

## 2019-09-28 NOTE — PROGRESS NOTE ADULT - SUBJECTIVE AND OBJECTIVE BOX
Op day s/p MIDCAB  EF nl      58yo DM, former smoker, PVD s/p Right CEA, family history of CAD recent cardiac workup for STARKEY  + nuclear stress test    9/25  elective LHC which showed  LAD- 100%  proximal with R-L and L-L collaterals, pD1-  90% stenosis, LCX- mild disease, pOM1- 80% stenosis, pOM2- 80% stenosis, RCA- Dominant.     Pt planned for hybrid procedure and underwent MIDCAB today  uncomplicated case.   Extubated  Hypertensive requiring Nicardipine  Mild lactic acidosis post op resolved with hydration     Problems  1. CAD s/p MIDCAB with plan for PCI in the future  2. hemodynamic instability     Plan   Neuro -- pain control  CVS - hemodynamic support, monitor for arrhythmia.  Monitor for bleeding  Nicardipine for BP support  Will start BB in AM   chest tube management  Pulm - stable on NC  Nebs PRN  GI - GI proph   - monitor UOP  Endo - glycemic control  Heme - correct coagulapathy, monitor for bleeding  ID - periop antibiotics.       Critical post op.    Critical care time spent 50 min

## 2019-09-28 NOTE — PROGRESS NOTE ADULT - SUBJECTIVE AND OBJECTIVE BOX
incomplete INTERVAL HPI/OVERNIGHT EVENTS:    Patient seen at bedside, in pain. States she has no appetite. Did not eat breakfast. Insulin drip was d/c this morning, required 2-3.5 units/hour overnight. Patient received 7 units NPH this morning. Started on basal/bolus.     Today:  pre-breakfast fs - on insulin drip   pre-lunch fs  nutritional lispro  3 units + 2  units lispro SS    Pt reports the following symptoms:  CONSTITUTIONAL:  Negative fever or chills, feels well, good appetite  EYES:  Negative  blurry vision or double vision  CARDIOVASCULAR:  Negative for chest pain or palpitations  RESPIRATORY:  Negative for cough, wheezing, or SOB   GASTROINTESTINAL:  Negative for nausea, vomiting, diarrhea, constipation, or abdominal pain  GENITOURINARY:  Negative frequency, urgency or dysuria  NEUROLOGIC:  No headache, confusion, dizziness, lightheadedness    MEDICATIONS  (STANDING):  aspirin  chewable 81 milliGRAM(s) Oral daily  ceFAZolin  Injectable. 2000 milliGRAM(s) IV Push every 8 hours  chlorhexidine 2% Cloths 1 Application(s) Topical <User Schedule>  clopidogrel Tablet 75 milliGRAM(s) Oral daily  dextrose 5%. 1000 milliLiter(s) (50 mL/Hr) IV Continuous <Continuous>  dextrose 50% Injectable 12.5 Gram(s) IV Push once  dextrose 50% Injectable 25 Gram(s) IV Push once  dextrose 50% Injectable 25 Gram(s) IV Push once  docusate sodium 100 milliGRAM(s) Oral three times a day  escitalopram 5 milliGRAM(s) Oral daily  heparin  Injectable 5000 Unit(s) SubCutaneous every 8 hours  insulin glargine Injectable (LANTUS) 14 Unit(s) SubCutaneous at bedtime  insulin lispro (HumaLOG) corrective regimen sliding scale   SubCutaneous Before meals and at bedtime  insulin lispro Injectable (HumaLOG) 3 Unit(s) SubCutaneous three times a day before meals  meclizine 25 milliGRAM(s) Oral every 12 hours  metoprolol tartrate 25 milliGRAM(s) Oral every 12 hours  metoprolol tartrate 12.5 milliGRAM(s) Oral every 12 hours  niCARdipine Infusion 5 mG/Hr (25 mL/Hr) IV Continuous <Continuous>  pantoprazole    Tablet 40 milliGRAM(s) Oral before breakfast  polyethylene glycol 3350 17 Gram(s) Oral daily  senna 2 Tablet(s) Oral at bedtime  simvastatin 20 milliGRAM(s) Oral at bedtime  sodium chloride 0.9% lock flush 3 milliLiter(s) IV Push every 8 hours  sodium chloride 0.9%. 1000 milliLiter(s) (10 mL/Hr) IV Continuous <Continuous>    MEDICATIONS  (PRN):  acetaminophen   Tablet .. 650 milliGRAM(s) Oral every 6 hours PRN Temp greater or equal to 38C (100.4F), Mild Pain (1 - 3)  dextrose 40% Gel 15 Gram(s) Oral once PRN Blood Glucose LESS THAN 70 milliGRAM(s)/deciliter  glucagon  Injectable 1 milliGRAM(s) IntraMuscular once PRN Glucose LESS THAN 70 milligrams/deciliter  glucagon  Injectable 1 milliGRAM(s) IntraMuscular Once PRN Glucose LESS THAN 70 milligrams/deciliter  ketorolac   Injectable 15 milliGRAM(s) IV Push every 6 hours PRN Moderate Pain (4 - 6)  oxyCODONE    5 mG/acetaminophen 325 mG 1 Tablet(s) Oral every 6 hours PRN Moderate Pain (4 - 6)  oxyCODONE    5 mG/acetaminophen 325 mG 2 Tablet(s) Oral every 6 hours PRN Severe Pain (7 - 10)      PHYSICAL EXAM  Vital Signs Last 24 Hrs  T(C): 36.2 (28 Sep 2019 10:17), Max: 37.3 (27 Sep 2019 18:08)  T(F): 97.2 (28 Sep 2019 10:17), Max: 99.2 (27 Sep 2019 18:08)  HR: 92 (28 Sep 2019 13:00) (88 - 116)  BP: 98/59 (28 Sep 2019 09:00) (98/59 - 98/59)  BP(mean): 68 (28 Sep 2019 09:00) (68 - 68)  RR: 22 (28 Sep 2019 13:00) (12 - 29)  SpO2: 97% (28 Sep 2019 13:00) (93% - 99%)    Constitutional: wn/wd in NAD.   HEENT: NCAT, EOMI, no proptosis or lid retraction  Neck: no thyromegaly or palpable thyroid nodules   Respiratory: lungs CTAB, +chest tube   Cardiovascular: regular rhythm, normal S1 and S2, no audible murmurs, no peripheral edema  GI: soft, NT/ND, no masses/HSM appreciated.  Neurology: no tremors, DTR 2+  Skin: no visible rashes/lesions  Psychiatric: AAO x 3, normal affect/mood.    LABS:                        10.2   15.46 )-----------( 264      ( 28 Sep 2019 04:05 )             32.1         136  |  103  |  10  ----------------------------<  121<H>  4.3   |  23  |  0.52    Ca    8.1<L>      28 Sep 2019 04:05  Phos  3.6       Mg     2.0         TPro  5.7<L>  /  Alb  2.9<L>  /  TBili  0.3  /  DBili  x   /  AST  18  /  ALT  15  /  AlkPhos  49      PT/INR - ( 28 Sep 2019 04:05 )   PT: 12.9 sec;   INR: 1.14          PTT - ( 28 Sep 2019 04:05 )  PTT:26.8 sec    Thyroid Stimulating Hormone, Serum: 0.723 uIU/mL ( @ 05:48)      HbA1C: 9.2 % ( @ 11:39)  9.2 % ( @ 05:48)  9.1 % ( @ 11:22)    CAPILLARY BLOOD GLUCOSE  POCT Blood Glucose.: 222 mg/dL (28 Sep 2019 11:03)  POCT Blood Glucose.: 134 mg/dL (28 Sep 2019 09:03)  POCT Blood Glucose.: 173 mg/dL (28 Sep 2019 07:46)  POCT Blood Glucose.: 105 mg/dL (28 Sep 2019 04:06)  POCT Blood Glucose.: 101 mg/dL (28 Sep 2019 02:28)  POCT Blood Glucose.: 127 mg/dL (28 Sep 2019 00:11)  POCT Blood Glucose.: 125 mg/dL (27 Sep 2019 22:58)  POCT Blood Glucose.: 83 mg/dL (27 Sep 2019 21:51)  POCT Blood Glucose.: 154 mg/dL (27 Sep 2019 19:41)  POCT Blood Glucose.: 177 mg/dL (27 Sep 2019 18:56)  POCT Blood Glucose.: 205 mg/dL (27 Sep 2019 17:55)  POCT Blood Glucose.: 262 mg/dL (27 Sep 2019 16:33)  POCT Blood Glucose.: 245 mg/dL (27 Sep 2019 15:41)  POCT Blood Glucose.: 237 mg/dL (27 Sep 2019 14:59)  POCT Blood Glucose.: 298 mg/dL (27 Sep 2019 14:12)      A/P: 60 y/o Romanian Speaking F former cigarette smoker x 20yrs (quit 2yrs ago), strong FMHX of CAD and PMH of HTN, HLD, DM, history of Carotid Artery Disease s/p R Sided Carotid Endarterectomy s/p MID-CABG.     1.  DM Type 2 - uncontrolled, complicated. Hba1c 9.2  - Please continue lantus 14 units at night.  - Please continue lispro 3 units before each meal.    - Please change lispro to moderate dose sliding scale four times daily with meals and at bedtime  - Carb consistent diet  - Pt's fingerstick glucose goal is 120 to 150    Case d/w Dr. Sheridan, CTS team informed.

## 2019-09-29 LAB
ALBUMIN SERPL ELPH-MCNC: 3 G/DL — LOW (ref 3.3–5)
ALP SERPL-CCNC: 49 U/L — SIGNIFICANT CHANGE UP (ref 40–120)
ALT FLD-CCNC: 9 U/L — LOW (ref 10–45)
ANION GAP SERPL CALC-SCNC: 5 MMOL/L — SIGNIFICANT CHANGE UP (ref 5–17)
APTT BLD: 28.8 SEC — SIGNIFICANT CHANGE UP (ref 27.5–36.3)
AST SERPL-CCNC: 11 U/L — SIGNIFICANT CHANGE UP (ref 10–40)
BILIRUB SERPL-MCNC: 0.2 MG/DL — SIGNIFICANT CHANGE UP (ref 0.2–1.2)
BUN SERPL-MCNC: 18 MG/DL — SIGNIFICANT CHANGE UP (ref 7–23)
CALCIUM SERPL-MCNC: 7.8 MG/DL — LOW (ref 8.4–10.5)
CHLORIDE SERPL-SCNC: 106 MMOL/L — SIGNIFICANT CHANGE UP (ref 96–108)
CO2 SERPL-SCNC: 27 MMOL/L — SIGNIFICANT CHANGE UP (ref 22–31)
CREAT SERPL-MCNC: 0.66 MG/DL — SIGNIFICANT CHANGE UP (ref 0.5–1.3)
GLUCOSE BLDC GLUCOMTR-MCNC: 197 MG/DL — HIGH (ref 70–99)
GLUCOSE BLDC GLUCOMTR-MCNC: 202 MG/DL — HIGH (ref 70–99)
GLUCOSE BLDC GLUCOMTR-MCNC: 284 MG/DL — HIGH (ref 70–99)
GLUCOSE BLDC GLUCOMTR-MCNC: 323 MG/DL — HIGH (ref 70–99)
GLUCOSE SERPL-MCNC: 194 MG/DL — HIGH (ref 70–99)
HCT VFR BLD CALC: 29.4 % — LOW (ref 34.5–45)
HGB BLD-MCNC: 8.7 G/DL — LOW (ref 11.5–15.5)
INR BLD: 1.18 — HIGH (ref 0.88–1.16)
MAGNESIUM SERPL-MCNC: 2.3 MG/DL — SIGNIFICANT CHANGE UP (ref 1.6–2.6)
MCHC RBC-ENTMCNC: 22.9 PG — LOW (ref 27–34)
MCHC RBC-ENTMCNC: 29.6 GM/DL — LOW (ref 32–36)
MCV RBC AUTO: 77.4 FL — LOW (ref 80–100)
NRBC # BLD: 0 /100 WBCS — SIGNIFICANT CHANGE UP (ref 0–0)
PHOSPHATE SERPL-MCNC: 2.2 MG/DL — LOW (ref 2.5–4.5)
PLATELET # BLD AUTO: 208 K/UL — SIGNIFICANT CHANGE UP (ref 150–400)
POTASSIUM SERPL-MCNC: 4.2 MMOL/L — SIGNIFICANT CHANGE UP (ref 3.5–5.3)
POTASSIUM SERPL-SCNC: 4.2 MMOL/L — SIGNIFICANT CHANGE UP (ref 3.5–5.3)
PROT SERPL-MCNC: 5.7 G/DL — LOW (ref 6–8.3)
PROTHROM AB SERPL-ACNC: 13.4 SEC — HIGH (ref 10–12.9)
RBC # BLD: 3.8 M/UL — SIGNIFICANT CHANGE UP (ref 3.8–5.2)
RBC # FLD: 14.3 % — SIGNIFICANT CHANGE UP (ref 10.3–14.5)
SODIUM SERPL-SCNC: 138 MMOL/L — SIGNIFICANT CHANGE UP (ref 135–145)
WBC # BLD: 10.45 K/UL — SIGNIFICANT CHANGE UP (ref 3.8–10.5)
WBC # FLD AUTO: 10.45 K/UL — SIGNIFICANT CHANGE UP (ref 3.8–10.5)

## 2019-09-29 PROCEDURE — 71045 X-RAY EXAM CHEST 1 VIEW: CPT | Mod: 26

## 2019-09-29 RX ORDER — INSULIN GLARGINE 100 [IU]/ML
18 INJECTION, SOLUTION SUBCUTANEOUS AT BEDTIME
Refills: 0 | Status: DISCONTINUED | OUTPATIENT
Start: 2019-09-29 | End: 2019-10-01

## 2019-09-29 RX ORDER — POTASSIUM PHOSPHATE, MONOBASIC POTASSIUM PHOSPHATE, DIBASIC 236; 224 MG/ML; MG/ML
15 INJECTION, SOLUTION INTRAVENOUS ONCE
Refills: 0 | Status: COMPLETED | OUTPATIENT
Start: 2019-09-29 | End: 2019-09-29

## 2019-09-29 RX ORDER — INSULIN GLARGINE 100 [IU]/ML
16 INJECTION, SOLUTION SUBCUTANEOUS AT BEDTIME
Refills: 0 | Status: DISCONTINUED | OUTPATIENT
Start: 2019-09-29 | End: 2019-09-29

## 2019-09-29 RX ORDER — METOPROLOL TARTRATE 50 MG
25 TABLET ORAL
Refills: 0 | Status: DISCONTINUED | OUTPATIENT
Start: 2019-09-29 | End: 2019-09-30

## 2019-09-29 RX ORDER — CALCIUM GLUCONATE 100 MG/ML
2 VIAL (ML) INTRAVENOUS ONCE
Refills: 0 | Status: COMPLETED | OUTPATIENT
Start: 2019-09-29 | End: 2019-09-29

## 2019-09-29 RX ORDER — INSULIN LISPRO 100/ML
6 VIAL (ML) SUBCUTANEOUS
Refills: 0 | Status: DISCONTINUED | OUTPATIENT
Start: 2019-09-29 | End: 2019-09-30

## 2019-09-29 RX ADMIN — Medication 30 MILLIGRAM(S): at 14:34

## 2019-09-29 RX ADMIN — HEPARIN SODIUM 5000 UNIT(S): 5000 INJECTION INTRAVENOUS; SUBCUTANEOUS at 07:48

## 2019-09-29 RX ADMIN — Medication 2: at 09:08

## 2019-09-29 RX ADMIN — Medication 25 MILLIGRAM(S): at 17:29

## 2019-09-29 RX ADMIN — Medication 30 MILLIGRAM(S): at 14:35

## 2019-09-29 RX ADMIN — SODIUM CHLORIDE 3 MILLILITER(S): 9 INJECTION INTRAMUSCULAR; INTRAVENOUS; SUBCUTANEOUS at 21:30

## 2019-09-29 RX ADMIN — Medication 81 MILLIGRAM(S): at 09:12

## 2019-09-29 RX ADMIN — OXYCODONE AND ACETAMINOPHEN 2 TABLET(S): 5; 325 TABLET ORAL at 22:19

## 2019-09-29 RX ADMIN — Medication 200 GRAM(S): at 02:43

## 2019-09-29 RX ADMIN — Medication 30 MILLIGRAM(S): at 09:03

## 2019-09-29 RX ADMIN — Medication 100 MILLIGRAM(S): at 07:47

## 2019-09-29 RX ADMIN — Medication 30 MILLIGRAM(S): at 01:41

## 2019-09-29 RX ADMIN — Medication 6 UNIT(S): at 17:25

## 2019-09-29 RX ADMIN — SODIUM CHLORIDE 3 MILLILITER(S): 9 INJECTION INTRAMUSCULAR; INTRAVENOUS; SUBCUTANEOUS at 12:11

## 2019-09-29 RX ADMIN — Medication 30 MILLIGRAM(S): at 01:09

## 2019-09-29 RX ADMIN — HEPARIN SODIUM 5000 UNIT(S): 5000 INJECTION INTRAVENOUS; SUBCUTANEOUS at 21:42

## 2019-09-29 RX ADMIN — OXYCODONE AND ACETAMINOPHEN 1 TABLET(S): 5; 325 TABLET ORAL at 10:11

## 2019-09-29 RX ADMIN — Medication 4: at 17:15

## 2019-09-29 RX ADMIN — OXYCODONE AND ACETAMINOPHEN 1 TABLET(S): 5; 325 TABLET ORAL at 09:10

## 2019-09-29 RX ADMIN — CLOPIDOGREL BISULFATE 75 MILLIGRAM(S): 75 TABLET, FILM COATED ORAL at 09:09

## 2019-09-29 RX ADMIN — ESCITALOPRAM OXALATE 5 MILLIGRAM(S): 10 TABLET, FILM COATED ORAL at 09:09

## 2019-09-29 RX ADMIN — INSULIN GLARGINE 18 UNIT(S): 100 INJECTION, SOLUTION SUBCUTANEOUS at 22:16

## 2019-09-29 RX ADMIN — HEPARIN SODIUM 5000 UNIT(S): 5000 INJECTION INTRAVENOUS; SUBCUTANEOUS at 13:35

## 2019-09-29 RX ADMIN — OXYCODONE AND ACETAMINOPHEN 1 TABLET(S): 5; 325 TABLET ORAL at 18:29

## 2019-09-29 RX ADMIN — PANTOPRAZOLE SODIUM 40 MILLIGRAM(S): 20 TABLET, DELAYED RELEASE ORAL at 07:43

## 2019-09-29 RX ADMIN — SODIUM CHLORIDE 3 MILLILITER(S): 9 INJECTION INTRAMUSCULAR; INTRAVENOUS; SUBCUTANEOUS at 07:23

## 2019-09-29 RX ADMIN — Medication 100 MILLIGRAM(S): at 21:41

## 2019-09-29 RX ADMIN — Medication 25 MILLIGRAM(S): at 21:41

## 2019-09-29 RX ADMIN — Medication 6: at 12:12

## 2019-09-29 RX ADMIN — LIDOCAINE 1 PATCH: 4 CREAM TOPICAL at 09:03

## 2019-09-29 RX ADMIN — Medication 8: at 22:16

## 2019-09-29 RX ADMIN — Medication 25 MILLIGRAM(S): at 07:47

## 2019-09-29 RX ADMIN — Medication 25 MILLIGRAM(S): at 09:09

## 2019-09-29 RX ADMIN — Medication 30 MILLIGRAM(S): at 07:48

## 2019-09-29 RX ADMIN — OXYCODONE AND ACETAMINOPHEN 2 TABLET(S): 5; 325 TABLET ORAL at 21:41

## 2019-09-29 RX ADMIN — LIDOCAINE 1 PATCH: 4 CREAM TOPICAL at 21:45

## 2019-09-29 RX ADMIN — SENNA PLUS 2 TABLET(S): 8.6 TABLET ORAL at 21:42

## 2019-09-29 RX ADMIN — Medication 100 MILLIGRAM(S): at 13:34

## 2019-09-29 RX ADMIN — Medication 3 UNIT(S): at 09:09

## 2019-09-29 RX ADMIN — Medication 3 UNIT(S): at 12:12

## 2019-09-29 RX ADMIN — POLYETHYLENE GLYCOL 3350 17 GRAM(S): 17 POWDER, FOR SOLUTION ORAL at 09:10

## 2019-09-29 RX ADMIN — OXYCODONE AND ACETAMINOPHEN 1 TABLET(S): 5; 325 TABLET ORAL at 17:29

## 2019-09-29 RX ADMIN — POTASSIUM PHOSPHATE, MONOBASIC POTASSIUM PHOSPHATE, DIBASIC 63.75 MILLIMOLE(S): 236; 224 INJECTION, SOLUTION INTRAVENOUS at 02:43

## 2019-09-29 RX ADMIN — SIMVASTATIN 20 MILLIGRAM(S): 20 TABLET, FILM COATED ORAL at 21:41

## 2019-09-29 NOTE — PROGRESS NOTE ADULT - SUBJECTIVE AND OBJECTIVE BOX
incomplete INTERVAL HPI/OVERNIGHT EVENTS:    Patient seen at bedside. Blood sugar in 300s at dinner time. Patient states a family member brought half a bagel which she consumed. Pt noted to have small pleural effusion.     FSG & Insulin received:  Yesterday:  pre-dinner fs  nutritional lispro 3  units + 4  units lispro SS  bedtime fs  lantus 14  units +  2  units lispro SS    Today:  pre-breakfast fs  nutritional lispro 3  units + 2  units lispro SS  pre-lunch fs  nutritional lispro 3  units+ 6  units lispro SS    Pt reports the following symptoms:  CONSTITUTIONAL:  Negative fever or chills, feels well, good appetite  EYES:  Negative  blurry vision or double vision  CARDIOVASCULAR:  Negative for chest pain or palpitations  RESPIRATORY:  Negative for cough, wheezing, or SOB   GASTROINTESTINAL:  Negative for nausea, vomiting, diarrhea, constipation, or abdominal pain  GENITOURINARY:  Negative frequency, urgency or dysuria  NEUROLOGIC:  No headache, confusion, dizziness, lightheadedness    MEDICATIONS  (STANDING):  aspirin  chewable 81 milliGRAM(s) Oral daily  chlorhexidine 2% Cloths 1 Application(s) Topical <User Schedule>  clopidogrel Tablet 75 milliGRAM(s) Oral daily  dextrose 5%. 1000 milliLiter(s) (50 mL/Hr) IV Continuous <Continuous>  dextrose 50% Injectable 12.5 Gram(s) IV Push once  dextrose 50% Injectable 25 Gram(s) IV Push once  dextrose 50% Injectable 25 Gram(s) IV Push once  docusate sodium 100 milliGRAM(s) Oral three times a day  escitalopram 5 milliGRAM(s) Oral daily  heparin  Injectable 5000 Unit(s) SubCutaneous every 8 hours  insulin glargine Injectable (LANTUS) 18 Unit(s) SubCutaneous at bedtime  insulin lispro (HumaLOG) corrective regimen sliding scale   SubCutaneous Before meals and at bedtime  insulin lispro Injectable (HumaLOG) 6 Unit(s) SubCutaneous three times a day before meals  lidocaine   Patch 1 Patch Transdermal every 24 hours  meclizine 25 milliGRAM(s) Oral every 12 hours  metoprolol tartrate 25 milliGRAM(s) Oral <User Schedule>  pantoprazole    Tablet 40 milliGRAM(s) Oral before breakfast  polyethylene glycol 3350 17 Gram(s) Oral daily  senna 2 Tablet(s) Oral at bedtime  simvastatin 20 milliGRAM(s) Oral at bedtime  sodium chloride 0.9% lock flush 3 milliLiter(s) IV Push every 8 hours  sodium chloride 0.9%. 1000 milliLiter(s) (10 mL/Hr) IV Continuous <Continuous>    MEDICATIONS  (PRN):  acetaminophen   Tablet .. 650 milliGRAM(s) Oral every 6 hours PRN Temp greater or equal to 38C (100.4F), Mild Pain (1 - 3)  dextrose 40% Gel 15 Gram(s) Oral once PRN Blood Glucose LESS THAN 70 milliGRAM(s)/deciliter  glucagon  Injectable 1 milliGRAM(s) IntraMuscular once PRN Glucose LESS THAN 70 milligrams/deciliter  glucagon  Injectable 1 milliGRAM(s) IntraMuscular Once PRN Glucose LESS THAN 70 milligrams/deciliter  oxyCODONE    5 mG/acetaminophen 325 mG 1 Tablet(s) Oral every 6 hours PRN Moderate Pain (4 - 6)  oxyCODONE    5 mG/acetaminophen 325 mG 2 Tablet(s) Oral every 6 hours PRN Severe Pain (7 - 10)      PHYSICAL EXAM  Vital Signs Last 24 Hrs  T(C): 36.5 (29 Sep 2019 17:44), Max: 37.4 (29 Sep 2019 10:07)  T(F): 97.7 (29 Sep 2019 17:44), Max: 99.4 (29 Sep 2019 10:07)  HR: 94 (29 Sep 2019 20:22) (84 - 100)  BP: 128/57 (29 Sep 2019 20:22) (99/59 - 160/74)  BP(mean): 82 (29 Sep 2019 20:22) (71 - 106)  RR: 18 (29 Sep 2019 20:22) (14 - 20)  SpO2: 96% (29 Sep 2019 20:22) (93% - 96%)    Constitutional: wn/wd in NAD.   HEENT: NCAT, MMM, OP clear, EOMI, no proptosis or lid retraction  Neck: no thyromegaly or palpable thyroid nodules   Respiratory: lungs CTAB.  Cardiovascular: regular rhythm, normal S1 and S2, no audible murmurs, no peripheral edema  GI: soft, NT/ND, no masses/HSM appreciated.  Neurology: no tremors, DTR 2+  Skin: no visible rashes/lesions  Psychiatric: AAO x 3, normal affect/mood.    LABS:                        8.7    10.45 )-----------( 208      ( 29 Sep 2019 00:49 )             29.4         138  |  106  |  18  ----------------------------<  194<H>  4.2   |  27  |  0.66    Ca    7.8<L>      29 Sep 2019 00:49  Phos  2.2       Mg     2.3         TPro  5.7<L>  /  Alb  3.0<L>  /  TBili  0.2  /  DBili  x   /  AST  11  /  ALT  9<L>  /  AlkPhos  49      PT/INR - ( 29 Sep 2019 00:49 )   PT: 13.4 sec;   INR: 1.18          PTT - ( 29 Sep 2019 00:49 )  PTT:28.8 sec    Thyroid Stimulating Hormone, Serum: 0.723 uIU/mL ( @ 05:48)      HbA1C: 9.2 % ( @ 11:39)  9.2 % ( @ 05:48)  9.1 % ( @ 11:22)    CAPILLARY BLOOD GLUCOSE  POCT Blood Glucose.: 202 mg/dL (29 Sep 2019 17:01)  POCT Blood Glucose.: 284 mg/dL (29 Sep 2019 11:52)  POCT Blood Glucose.: 197 mg/dL (29 Sep 2019 09:00)  POCT Blood Glucose.: 195 mg/dL (28 Sep 2019 21:50)    A/P: 60 y/o Frisian Speaking F former cigarette smoker x 20yrs (quit 2yrs ago), strong FMHX of CAD and PMH of HTN, HLD, DM, history of Carotid Artery Disease s/p R Sided Carotid Endarterectomy s/p MID-CABG.     1.  DM Type 2 - uncontrolled, complicated. Hba1c 9.2  - Please increase lantus to 18 units at night.  - Please increase lispro to 6 units before each meal.    - Continue moderate dose sliding scale four times daily with meals and at bedtime  - Carb consistent diet  - Pt's fingerstick glucose goal is 120 to 150    Case d/w Dr. Sheridan, CTS team informed.

## 2019-09-29 NOTE — PROGRESS NOTE ADULT - ASSESSMENT
58 y/o Ukrainian Speaking F former 5-6 cigarette x 20yrs (quit 2yrs ago) with strong FMHx of CAD and PMH of HTN, HLD, DM, history of diagnostic cardiac catheterization @ [facility] in 2013, Carotid Artery Disease s/p R Sided Carotid Endarterectomy, who presents w/ STARKEY after 1/2 flight of stairs and does not occur at rest. Work up including NST in 8/2019 showed a medium size completely reversible perfusion defect of moderate intensity involving the mid-apical entire septum and the inferior myocardial walls. Cardiac Cath revealed LAD- 100%  proximal with R-L and L-L collaterals, pD1-  90% stenosis, LCX- mild disease, pOM1- 80% stenosis, pOM2- 80% stenosis, LVEF  60%, CT surgery was consulted, pt underwent pre-surgical testing and was deemed a surgical candidate. On 9/27 she underwent robotic MIDCAB. Operation was uncomplicated. She was breifly placed on Cleviprex for htn. She was extubated on POD 0. On POD 1 Endocrinology was consulted for Hga1c 9.2 and fsg in 200s. Today on POD 3 she was deemed stable for transfer to floor.     Neuro: Pain well controlled with current regimen  - No delirium  - Mentating well  - Toradol/Tylenol/Percocet PRN pain  - Lidocaine patch  - Cont escitalopram for anxiety/depression  - Meclizine for dizziness    Respiratory sP02 96% on RA  - CXR showing atelectasis  - Poor inspiratory effort 2/2 pain  - Encourage ambulation C+DB and use of IS 10x / hr while awake    Cardiac, S/p MIDCAB  - EF 60%  - Pt in NSR  - Continue ASA, plavix, statin, metoprolol 25 BID (titrate as tolerated)  - Will go for future completion PCI    GI: -BM + flatus  - Continue bowel regimen  - GI PPX with protonix   - PO diet    Renal/: BUN/Cr (18/0.66)  - Monitor renal function  - Monitor I/Os  - Replete K<4.0, Mg<2.0    Heme: H&H (8.7/29.4)  - H&H stable, continue to monitor   - DVT ppx with 5000u SQH, SCD    Endocrinology: Hx of  DM  - A1C 9.2, Endocrinology following, appreciate reccs  - Cont lantus 14, lispro 3, mISS with meals and before bed  - -284 today  - TSH WNL today     ID: afebrile WBC 10.45  - Observe for SIRS/Sepsis Syndrome    Dispo: Home when medically cleared

## 2019-09-29 NOTE — PROGRESS NOTE ADULT - SUBJECTIVE AND OBJECTIVE BOX
Patient discussed on morning rounds with Dr. Smith    Operation / Date: 9/27/2019 MIDCAB EF nml    SUBJECTIVE ASSESSMENT:  Pt seen and examined after transfer to [facility]. She reports having incisional pain and requests more pain medication. She states she has not had a BM yet, passing gas. She is tolerating PO diet well. Using IS pulling 750. Reports having some dizziness with ambulation,  Denies headache, dizziness, SOB, STARKEY, N/V/D, abd pain, calf pain, leg swelling         Vital Signs Last 24 Hrs  T(C): 36.8 (29 Sep 2019 13:51), Max: 37.4 (29 Sep 2019 10:07)  T(F): 98.2 (29 Sep 2019 13:51), Max: 99.4 (29 Sep 2019 10:07)  HR: 84 (29 Sep 2019 12:00) (84 - 106)  BP: 114/55 (29 Sep 2019 12:00) (99/59 - 159/79)  BP(mean): 79 (29 Sep 2019 12:00) (71 - 103)  RR: 18 (29 Sep 2019 12:00) (14 - 25)  SpO2: 93% (29 Sep 2019 12:00) (93% - 96%)  I&O's Detail    28 Sep 2019 07:01  -  29 Sep 2019 07:00  --------------------------------------------------------  IN:    insulin regular Infusion: 10.5 mL    IV PiggyBack: 250 mL    sodium chloride 0.9%.: 240 mL    Solution: 62.5 mL  Total IN: 563 mL    OUT:    Bulb: 25 mL    Chest Tube: 70 mL    Indwelling Catheter - Urethral: 820 mL    Voided: 500 mL  Total OUT: 1415 mL    Total NET: -852 mL      29 Sep 2019 07:01  -  29 Sep 2019 15:58  --------------------------------------------------------  IN:    Oral Fluid: 300 mL  Total IN: 300 mL    OUT:    Voided: 550 mL  Total OUT: 550 mL    Total NET: -250 mL          CHEST TUBE:no  JOHNSON DRAIN:  No.  EPICARDIAL WIRES: No.  TIE DOWNS: Yes.  MESSINA: No.    PHYSICAL EXAM:    General: Sitting in chair,  NAD    Neurological: A&O x3, no focal deficits, strength 5/5 throughout    Cardiovascular: RRR, normal s1 s2, no M/R/G    Respiratory: Non-labored breathing, chest expansion symmetric, CTA b/l, no W/R/R, poor inspiratory effort 2/2 pain     Gastrointestinal: +BS x4 quadrant, soft, non-tender to palpation, non-distended    Extremities: WWP, no pitting edema, no calf tenderness or erythema    Vascular: 2+radial pulses b/l, 2+DP pulses b/l    Incision: L mini thoracotomy  CD&I, no discharge, no evidence of dehiscence, no drainage.     LABS:                        8.7    10.45 )-----------( 208      ( 29 Sep 2019 00:49 )             29.4       COUMADIN:  no .    PT/INR - ( 29 Sep 2019 00:49 )   PT: 13.4 sec;   INR: 1.18          PTT - ( 29 Sep 2019 00:49 )  PTT:28.8 sec    09-29    138  |  106  |  18  ----------------------------<  194<H>  4.2   |  27  |  0.66    Ca    7.8<L>      29 Sep 2019 00:49  Phos  2.2     09-29  Mg     2.3     09-29    TPro  5.7<L>  /  Alb  3.0<L>  /  TBili  0.2  /  DBili  x   /  AST  11  /  ALT  9<L>  /  AlkPhos  49  09-29          MEDICATIONS  (STANDING):  aspirin  chewable 81 milliGRAM(s) Oral daily  chlorhexidine 2% Cloths 1 Application(s) Topical <User Schedule>  clopidogrel Tablet 75 milliGRAM(s) Oral daily  dextrose 5%. 1000 milliLiter(s) (50 mL/Hr) IV Continuous <Continuous>  dextrose 50% Injectable 12.5 Gram(s) IV Push once  dextrose 50% Injectable 25 Gram(s) IV Push once  dextrose 50% Injectable 25 Gram(s) IV Push once  docusate sodium 100 milliGRAM(s) Oral three times a day  escitalopram 5 milliGRAM(s) Oral daily  heparin  Injectable 5000 Unit(s) SubCutaneous every 8 hours  insulin glargine Injectable (LANTUS) 14 Unit(s) SubCutaneous at bedtime  insulin lispro (HumaLOG) corrective regimen sliding scale   SubCutaneous Before meals and at bedtime  insulin lispro Injectable (HumaLOG) 3 Unit(s) SubCutaneous three times a day before meals  lidocaine   Patch 1 Patch Transdermal every 24 hours  meclizine 25 milliGRAM(s) Oral every 12 hours  metoprolol tartrate 25 milliGRAM(s) Oral <User Schedule>  pantoprazole    Tablet 40 milliGRAM(s) Oral before breakfast  polyethylene glycol 3350 17 Gram(s) Oral daily  senna 2 Tablet(s) Oral at bedtime  simvastatin 20 milliGRAM(s) Oral at bedtime  sodium chloride 0.9% lock flush 3 milliLiter(s) IV Push every 8 hours  sodium chloride 0.9%. 1000 milliLiter(s) (10 mL/Hr) IV Continuous <Continuous>    MEDICATIONS  (PRN):  acetaminophen   Tablet .. 650 milliGRAM(s) Oral every 6 hours PRN Temp greater or equal to 38C (100.4F), Mild Pain (1 - 3)  dextrose 40% Gel 15 Gram(s) Oral once PRN Blood Glucose LESS THAN 70 milliGRAM(s)/deciliter  glucagon  Injectable 1 milliGRAM(s) IntraMuscular once PRN Glucose LESS THAN 70 milligrams/deciliter  glucagon  Injectable 1 milliGRAM(s) IntraMuscular Once PRN Glucose LESS THAN 70 milligrams/deciliter  oxyCODONE    5 mG/acetaminophen 325 mG 1 Tablet(s) Oral every 6 hours PRN Moderate Pain (4 - 6)  oxyCODONE    5 mG/acetaminophen 325 mG 2 Tablet(s) Oral every 6 hours PRN Severe Pain (7 - 10)        RADIOLOGY & ADDITIONAL TESTS:    < from: Xray Chest 1 View- PORTABLE-Routine (09.29.19 @ 07:26) >  Portable examination of the chest demonstrates mild cardiomegaly. Right   internal jugular line noted with tip overlying right atrium. Left   effusion. Degenerative changes thoracic spine    Impression: Left effusion      < end of copied text >

## 2019-09-30 LAB
ANION GAP SERPL CALC-SCNC: 9 MMOL/L — SIGNIFICANT CHANGE UP (ref 5–17)
BUN SERPL-MCNC: 17 MG/DL — SIGNIFICANT CHANGE UP (ref 7–23)
CALCIUM SERPL-MCNC: 8.3 MG/DL — LOW (ref 8.4–10.5)
CHLORIDE SERPL-SCNC: 103 MMOL/L — SIGNIFICANT CHANGE UP (ref 96–108)
CO2 SERPL-SCNC: 27 MMOL/L — SIGNIFICANT CHANGE UP (ref 22–31)
CREAT SERPL-MCNC: 0.68 MG/DL — SIGNIFICANT CHANGE UP (ref 0.5–1.3)
GLUCOSE BLDC GLUCOMTR-MCNC: 158 MG/DL — HIGH (ref 70–99)
GLUCOSE BLDC GLUCOMTR-MCNC: 176 MG/DL — HIGH (ref 70–99)
GLUCOSE BLDC GLUCOMTR-MCNC: 176 MG/DL — HIGH (ref 70–99)
GLUCOSE BLDC GLUCOMTR-MCNC: 204 MG/DL — HIGH (ref 70–99)
GLUCOSE SERPL-MCNC: 172 MG/DL — HIGH (ref 70–99)
HCT VFR BLD CALC: 31.8 % — LOW (ref 34.5–45)
HGB BLD-MCNC: 10.1 G/DL — LOW (ref 11.5–15.5)
MAGNESIUM SERPL-MCNC: 1.8 MG/DL — SIGNIFICANT CHANGE UP (ref 1.6–2.6)
MCHC RBC-ENTMCNC: 24.8 PG — LOW (ref 27–34)
MCHC RBC-ENTMCNC: 31.8 GM/DL — LOW (ref 32–36)
MCV RBC AUTO: 77.9 FL — LOW (ref 80–100)
NRBC # BLD: 0 /100 WBCS — SIGNIFICANT CHANGE UP (ref 0–0)
PLATELET # BLD AUTO: 252 K/UL — SIGNIFICANT CHANGE UP (ref 150–400)
POTASSIUM SERPL-MCNC: 4.4 MMOL/L — SIGNIFICANT CHANGE UP (ref 3.5–5.3)
POTASSIUM SERPL-SCNC: 4.4 MMOL/L — SIGNIFICANT CHANGE UP (ref 3.5–5.3)
RBC # BLD: 4.08 M/UL — SIGNIFICANT CHANGE UP (ref 3.8–5.2)
RBC # FLD: 14.1 % — SIGNIFICANT CHANGE UP (ref 10.3–14.5)
SODIUM SERPL-SCNC: 139 MMOL/L — SIGNIFICANT CHANGE UP (ref 135–145)
WBC # BLD: 9.19 K/UL — SIGNIFICANT CHANGE UP (ref 3.8–10.5)
WBC # FLD AUTO: 9.19 K/UL — SIGNIFICANT CHANGE UP (ref 3.8–10.5)

## 2019-09-30 PROCEDURE — 71045 X-RAY EXAM CHEST 1 VIEW: CPT | Mod: 26

## 2019-09-30 PROCEDURE — 71046 X-RAY EXAM CHEST 2 VIEWS: CPT | Mod: 26

## 2019-09-30 PROCEDURE — 99232 SBSQ HOSP IP/OBS MODERATE 35: CPT | Mod: GC

## 2019-09-30 RX ORDER — METOPROLOL TARTRATE 50 MG
12.5 TABLET ORAL ONCE
Refills: 0 | Status: COMPLETED | OUTPATIENT
Start: 2019-09-30 | End: 2019-09-30

## 2019-09-30 RX ORDER — METOPROLOL TARTRATE 50 MG
37.5 TABLET ORAL
Refills: 0 | Status: DISCONTINUED | OUTPATIENT
Start: 2019-09-30 | End: 2019-10-02

## 2019-09-30 RX ORDER — INSULIN LISPRO 100/ML
9 VIAL (ML) SUBCUTANEOUS
Refills: 0 | Status: DISCONTINUED | OUTPATIENT
Start: 2019-09-30 | End: 2019-10-01

## 2019-09-30 RX ORDER — FUROSEMIDE 40 MG
20 TABLET ORAL ONCE
Refills: 0 | Status: COMPLETED | OUTPATIENT
Start: 2019-09-30 | End: 2019-09-30

## 2019-09-30 RX ORDER — MAGNESIUM OXIDE 400 MG ORAL TABLET 241.3 MG
400 TABLET ORAL ONCE
Refills: 0 | Status: COMPLETED | OUTPATIENT
Start: 2019-09-30 | End: 2019-09-30

## 2019-09-30 RX ADMIN — PANTOPRAZOLE SODIUM 40 MILLIGRAM(S): 20 TABLET, DELAYED RELEASE ORAL at 07:03

## 2019-09-30 RX ADMIN — OXYCODONE AND ACETAMINOPHEN 2 TABLET(S): 5; 325 TABLET ORAL at 18:46

## 2019-09-30 RX ADMIN — Medication 6 UNIT(S): at 07:41

## 2019-09-30 RX ADMIN — HEPARIN SODIUM 5000 UNIT(S): 5000 INJECTION INTRAVENOUS; SUBCUTANEOUS at 07:03

## 2019-09-30 RX ADMIN — INSULIN GLARGINE 18 UNIT(S): 100 INJECTION, SOLUTION SUBCUTANEOUS at 22:24

## 2019-09-30 RX ADMIN — Medication 6 UNIT(S): at 12:37

## 2019-09-30 RX ADMIN — Medication 12.5 MILLIGRAM(S): at 09:46

## 2019-09-30 RX ADMIN — LIDOCAINE 1 PATCH: 4 CREAM TOPICAL at 06:51

## 2019-09-30 RX ADMIN — Medication 650 MILLIGRAM(S): at 22:23

## 2019-09-30 RX ADMIN — MAGNESIUM OXIDE 400 MG ORAL TABLET 400 MILLIGRAM(S): 241.3 TABLET ORAL at 09:46

## 2019-09-30 RX ADMIN — OXYCODONE AND ACETAMINOPHEN 2 TABLET(S): 5; 325 TABLET ORAL at 13:58

## 2019-09-30 RX ADMIN — LIDOCAINE 1 PATCH: 4 CREAM TOPICAL at 22:33

## 2019-09-30 RX ADMIN — Medication 100 MILLIGRAM(S): at 22:21

## 2019-09-30 RX ADMIN — Medication 4: at 22:25

## 2019-09-30 RX ADMIN — CHLORHEXIDINE GLUCONATE 1 APPLICATION(S): 213 SOLUTION TOPICAL at 07:04

## 2019-09-30 RX ADMIN — SENNA PLUS 2 TABLET(S): 8.6 TABLET ORAL at 22:23

## 2019-09-30 RX ADMIN — Medication 100 MILLIGRAM(S): at 13:13

## 2019-09-30 RX ADMIN — HEPARIN SODIUM 5000 UNIT(S): 5000 INJECTION INTRAVENOUS; SUBCUTANEOUS at 22:21

## 2019-09-30 RX ADMIN — Medication 25 MILLIGRAM(S): at 18:46

## 2019-09-30 RX ADMIN — Medication 37.5 MILLIGRAM(S): at 17:37

## 2019-09-30 RX ADMIN — OXYCODONE AND ACETAMINOPHEN 1 TABLET(S): 5; 325 TABLET ORAL at 07:24

## 2019-09-30 RX ADMIN — OXYCODONE AND ACETAMINOPHEN 2 TABLET(S): 5; 325 TABLET ORAL at 20:18

## 2019-09-30 RX ADMIN — Medication 2: at 17:37

## 2019-09-30 RX ADMIN — SODIUM CHLORIDE 3 MILLILITER(S): 9 INJECTION INTRAMUSCULAR; INTRAVENOUS; SUBCUTANEOUS at 22:00

## 2019-09-30 RX ADMIN — Medication 5 MILLIGRAM(S): at 22:21

## 2019-09-30 RX ADMIN — CLOPIDOGREL BISULFATE 75 MILLIGRAM(S): 75 TABLET, FILM COATED ORAL at 12:38

## 2019-09-30 RX ADMIN — Medication 2: at 07:40

## 2019-09-30 RX ADMIN — OXYCODONE AND ACETAMINOPHEN 2 TABLET(S): 5; 325 TABLET ORAL at 12:46

## 2019-09-30 RX ADMIN — SODIUM CHLORIDE 3 MILLILITER(S): 9 INJECTION INTRAMUSCULAR; INTRAVENOUS; SUBCUTANEOUS at 13:14

## 2019-09-30 RX ADMIN — Medication 25 MILLIGRAM(S): at 07:03

## 2019-09-30 RX ADMIN — OXYCODONE AND ACETAMINOPHEN 1 TABLET(S): 5; 325 TABLET ORAL at 07:02

## 2019-09-30 RX ADMIN — HEPARIN SODIUM 5000 UNIT(S): 5000 INJECTION INTRAVENOUS; SUBCUTANEOUS at 13:13

## 2019-09-30 RX ADMIN — Medication 81 MILLIGRAM(S): at 12:38

## 2019-09-30 RX ADMIN — SODIUM CHLORIDE 3 MILLILITER(S): 9 INJECTION INTRAMUSCULAR; INTRAVENOUS; SUBCUTANEOUS at 06:51

## 2019-09-30 RX ADMIN — POLYETHYLENE GLYCOL 3350 17 GRAM(S): 17 POWDER, FOR SOLUTION ORAL at 12:50

## 2019-09-30 RX ADMIN — Medication 25 MILLIGRAM(S): at 07:41

## 2019-09-30 RX ADMIN — Medication 100 MILLIGRAM(S): at 07:02

## 2019-09-30 RX ADMIN — Medication 2: at 12:37

## 2019-09-30 RX ADMIN — Medication 6 UNIT(S): at 17:37

## 2019-09-30 RX ADMIN — Medication 650 MILLIGRAM(S): at 23:23

## 2019-09-30 RX ADMIN — ESCITALOPRAM OXALATE 5 MILLIGRAM(S): 10 TABLET, FILM COATED ORAL at 12:38

## 2019-09-30 RX ADMIN — Medication 20 MILLIGRAM(S): at 18:46

## 2019-09-30 RX ADMIN — SIMVASTATIN 20 MILLIGRAM(S): 20 TABLET, FILM COATED ORAL at 22:23

## 2019-09-30 RX ADMIN — LIDOCAINE 1 PATCH: 4 CREAM TOPICAL at 09:03

## 2019-09-30 NOTE — CHART NOTE - NSCHARTNOTEFT_GEN_A_CORE
Exam:  US Chest    Procedure Date: 9/27/19    History: 59y Female whose CXR today shows a possible left sided pleural effusion.  Pt is POD # 3 _ from MIDCAB    Findings:                    Evaluation of bilateral side of the thoracic cavity demonstrates                   small left pleural effusion, no right pleural effusion                     Impression:  small left pleural effusion, no right pleural effusion Chest pain

## 2019-09-30 NOTE — PROGRESS NOTE ADULT - SUBJECTIVE AND OBJECTIVE BOX
INTERVAL HPI/OVERNIGHT EVENTS:    Patient is a 59y old  Female who presents with a chief complaint of SOB with abnormal nuclear stress test (30 Sep 2019 17:10)      Pt reports the following symptoms:    CONSTITUTIONAL:  Negative fever or chills, feels well, good appetite  EYES:  Negative  blurry vision or double vision  CARDIOVASCULAR:  Negative for chest pain or palpitations  RESPIRATORY:  Negative for cough, wheezing, or SOB   GASTROINTESTINAL:  Negative for nausea, vomiting, diarrhea, constipation, or abdominal pain  GENITOURINARY:  Negative frequency, urgency or dysuria  NEUROLOGIC:  No headache, confusion, dizziness, lightheadedness    MEDICATIONS  (STANDING):  aspirin  chewable 81 milliGRAM(s) Oral daily  bisacodyl 5 milliGRAM(s) Oral at bedtime  chlorhexidine 2% Cloths 1 Application(s) Topical <User Schedule>  clopidogrel Tablet 75 milliGRAM(s) Oral daily  dextrose 5%. 1000 milliLiter(s) (50 mL/Hr) IV Continuous <Continuous>  dextrose 50% Injectable 12.5 Gram(s) IV Push once  dextrose 50% Injectable 25 Gram(s) IV Push once  dextrose 50% Injectable 25 Gram(s) IV Push once  docusate sodium 100 milliGRAM(s) Oral three times a day  escitalopram 5 milliGRAM(s) Oral daily  heparin  Injectable 5000 Unit(s) SubCutaneous every 8 hours  insulin glargine Injectable (LANTUS) 18 Unit(s) SubCutaneous at bedtime  insulin lispro (HumaLOG) corrective regimen sliding scale   SubCutaneous Before meals and at bedtime  insulin lispro Injectable (HumaLOG) 9 Unit(s) SubCutaneous three times a day before meals  lidocaine   Patch 1 Patch Transdermal every 24 hours  meclizine 25 milliGRAM(s) Oral every 12 hours  metoprolol tartrate 37.5 milliGRAM(s) Oral <User Schedule>  pantoprazole    Tablet 40 milliGRAM(s) Oral before breakfast  polyethylene glycol 3350 17 Gram(s) Oral daily  senna 2 Tablet(s) Oral at bedtime  simvastatin 20 milliGRAM(s) Oral at bedtime  sodium chloride 0.9% lock flush 3 milliLiter(s) IV Push every 8 hours  sodium chloride 0.9%. 1000 milliLiter(s) (10 mL/Hr) IV Continuous <Continuous>    MEDICATIONS  (PRN):  acetaminophen   Tablet .. 650 milliGRAM(s) Oral every 6 hours PRN Temp greater or equal to 38C (100.4F), Mild Pain (1 - 3)  dextrose 40% Gel 15 Gram(s) Oral once PRN Blood Glucose LESS THAN 70 milliGRAM(s)/deciliter  glucagon  Injectable 1 milliGRAM(s) IntraMuscular once PRN Glucose LESS THAN 70 milligrams/deciliter  glucagon  Injectable 1 milliGRAM(s) IntraMuscular Once PRN Glucose LESS THAN 70 milligrams/deciliter  oxyCODONE    5 mG/acetaminophen 325 mG 1 Tablet(s) Oral every 6 hours PRN Moderate Pain (4 - 6)  oxyCODONE    5 mG/acetaminophen 325 mG 2 Tablet(s) Oral every 6 hours PRN Severe Pain (7 - 10)      PHYSICAL EXAM  Vital Signs Last 24 Hrs  T(C): 37.6 (30 Sep 2019 18:17), Max: 37.6 (30 Sep 2019 18:17)  T(F): 99.7 (30 Sep 2019 18:17), Max: 99.7 (30 Sep 2019 18:17)  HR: 96 (30 Sep 2019 17:00) (90 - 100)  BP: 142/67 (30 Sep 2019 17:00) (111/63 - 147/72)  BP(mean): 96 (30 Sep 2019 17:00) (80 - 103)  RR: 20 (30 Sep 2019 17:00) (16 - 20)  SpO2: 96% (30 Sep 2019 17:00) (94% - 98%)    Constitutional: wn/wd in NAD.   HEENT: NCAT, MMM, OP clear, EOMI, no proptosis or lid retraction  Neck: no thyromegaly or palpable thyroid nodules   Respiratory: lungs CTAB.  Cardiovascular: regular rhythm, normal S1 and S2, no audible murmurs, no peripheral edema  GI: soft, NT/ND, no masses/HSM appreciated.  Neurology: no tremors, DTR 2+  Skin: no visible rashes/lesions  Psychiatric: AAO x 3, normal affect/mood.    LABS:                        10.1   9.19  )-----------( 252      ( 30 Sep 2019 06:57 )             31.8     09-30    139  |  103  |  17  ----------------------------<  172<H>  4.4   |  27  |  0.68    Ca    8.3<L>      30 Sep 2019 06:57  Phos  2.2     09-29  Mg     1.8     09-30    TPro  5.7<L>  /  Alb  3.0<L>  /  TBili  0.2  /  DBili  x   /  AST  11  /  ALT  9<L>  /  AlkPhos  49  09-29    PT/INR - ( 29 Sep 2019 00:49 )   PT: 13.4 sec;   INR: 1.18          PTT - ( 29 Sep 2019 00:49 )  PTT:28.8 sec    Thyroid Stimulating Hormone, Serum: 0.723 uIU/mL (09-26 @ 05:48)      HbA1C: 9.2 % (09-26 @ 11:39)  9.2 % (09-26 @ 05:48)  9.1 % (09-25 @ 11:22)    CAPILLARY BLOOD GLUCOSE      POCT Blood Glucose.: 176 mg/dL (30 Sep 2019 17:21)  POCT Blood Glucose.: 176 mg/dL (30 Sep 2019 11:44)  POCT Blood Glucose.: 158 mg/dL (30 Sep 2019 06:50)  POCT Blood Glucose.: 323 mg/dL (29 Sep 2019 21:59)      Insulin Sliding Scale requirements X 24 Hours:    RADIOLOGY & ADDITIONAL TESTS:    A/P: 59y Female with history of DM type II presenting for       1.  DM -     Please continue           units lantus at bedtime  / in the morning and        units lispro with meals and lispro moderate / low dose sliding scale 4 times daily with meals and at bedtime.  Please continue consistent carbohydrate diet.      Goal FSG is   Will continue to monitor   For discharge, pt can continue    Pt can follow up at discharge with [facility] by calling  to make an appointment.   Will discuss case with     and update primary team INTERVAL HPI/OVERNIGHT EVENTS:    Patient seen and examined at the bedside. No acute events overnight. She is being planned for another PCI on Wednesday. her appetite is okay    FSG & Insulin received:  Yesterday:  pre-dinner fs, 6 nutritional lispro   units + 4  units lispro SS  bedtime fs, 18 lantus   units + 8   units lispro SS  Today:  pre-breakfast fs, 6 nutritional lispro   units+ 2   units lispro SS  pre-lunch fs    Pt reports the following symptoms:    CONSTITUTIONAL:  Negative fever or chills, feels well, good appetite  EYES:  Negative  blurry vision or double vision  CARDIOVASCULAR:  Negative for chest pain or palpitations  RESPIRATORY:  Negative for cough, wheezing, or SOB   GASTROINTESTINAL:  Negative for nausea, vomiting, diarrhea, constipation, or abdominal pain  GENITOURINARY:  Negative frequency, urgency or dysuria  NEUROLOGIC:  No headache, confusion, dizziness, lightheadedness    MEDICATIONS  (STANDING):  aspirin  chewable 81 milliGRAM(s) Oral daily  bisacodyl 5 milliGRAM(s) Oral at bedtime  chlorhexidine 2% Cloths 1 Application(s) Topical <User Schedule>  clopidogrel Tablet 75 milliGRAM(s) Oral daily  dextrose 5%. 1000 milliLiter(s) (50 mL/Hr) IV Continuous <Continuous>  dextrose 50% Injectable 12.5 Gram(s) IV Push once  dextrose 50% Injectable 25 Gram(s) IV Push once  dextrose 50% Injectable 25 Gram(s) IV Push once  docusate sodium 100 milliGRAM(s) Oral three times a day  escitalopram 5 milliGRAM(s) Oral daily  heparin  Injectable 5000 Unit(s) SubCutaneous every 8 hours  insulin glargine Injectable (LANTUS) 18 Unit(s) SubCutaneous at bedtime  insulin lispro (HumaLOG) corrective regimen sliding scale   SubCutaneous Before meals and at bedtime  insulin lispro Injectable (HumaLOG) 9 Unit(s) SubCutaneous three times a day before meals  lidocaine   Patch 1 Patch Transdermal every 24 hours  meclizine 25 milliGRAM(s) Oral every 12 hours  metoprolol tartrate 37.5 milliGRAM(s) Oral <User Schedule>  pantoprazole    Tablet 40 milliGRAM(s) Oral before breakfast  polyethylene glycol 3350 17 Gram(s) Oral daily  senna 2 Tablet(s) Oral at bedtime  simvastatin 20 milliGRAM(s) Oral at bedtime  sodium chloride 0.9% lock flush 3 milliLiter(s) IV Push every 8 hours  sodium chloride 0.9%. 1000 milliLiter(s) (10 mL/Hr) IV Continuous <Continuous>    MEDICATIONS  (PRN):  acetaminophen   Tablet .. 650 milliGRAM(s) Oral every 6 hours PRN Temp greater or equal to 38C (100.4F), Mild Pain (1 - 3)  dextrose 40% Gel 15 Gram(s) Oral once PRN Blood Glucose LESS THAN 70 milliGRAM(s)/deciliter  glucagon  Injectable 1 milliGRAM(s) IntraMuscular once PRN Glucose LESS THAN 70 milligrams/deciliter  glucagon  Injectable 1 milliGRAM(s) IntraMuscular Once PRN Glucose LESS THAN 70 milligrams/deciliter  oxyCODONE    5 mG/acetaminophen 325 mG 1 Tablet(s) Oral every 6 hours PRN Moderate Pain (4 - 6)  oxyCODONE    5 mG/acetaminophen 325 mG 2 Tablet(s) Oral every 6 hours PRN Severe Pain (7 - 10)      PHYSICAL EXAM  Vital Signs Last 24 Hrs  T(C): 37.6 (30 Sep 2019 18:17), Max: 37.6 (30 Sep 2019 18:17)  T(F): 99.7 (30 Sep 2019 18:17), Max: 99.7 (30 Sep 2019 18:17)  HR: 96 (30 Sep 2019 17:00) (90 - 100)  BP: 142/67 (30 Sep 2019 17:00) (111/63 - 147/72)  BP(mean): 96 (30 Sep 2019 17:00) (80 - 103)  RR: 20 (30 Sep 2019 17:00) (16 - 20)  SpO2: 96% (30 Sep 2019 17:00) (94% - 98%)    Constitutional: wn/wd in NAD.   Respiratory: lungs CTAB.  Cardiovascular: regular rhythm, normal S1 and S2, no peripheral edema  GI: soft, NT/ND, no masses/HSM appreciated.  Neurology: no tremors, DTR 2+, no focal neurological deficits    LABS:                        10.1   9.19  )-----------( 252      ( 30 Sep 2019 06:57 )             31.8     09-30    139  |  103  |  17  ----------------------------<  172<H>  4.4   |  27  |  0.68    Ca    8.3<L>      30 Sep 2019 06:57  Phos  2.2       Mg     1.8         TPro  5.7<L>  /  Alb  3.0<L>  /  TBili  0.2  /  DBili  x   /  AST  11  /  ALT  9<L>  /  AlkPhos  49      PT/INR - ( 29 Sep 2019 00:49 )   PT: 13.4 sec;   INR: 1.18          PTT - ( 29 Sep 2019 00:49 )  PTT:28.8 sec    Thyroid Stimulating Hormone, Serum: 0.723 uIU/mL ( @ 05:48)      HbA1C: 9.2 % ( @ 11:39)  9.2 % ( @ 05:48)  9.1 % ( @ 11:22)    CAPILLARY BLOOD GLUCOSE      POCT Blood Glucose.: 176 mg/dL (30 Sep 2019 17:21)  POCT Blood Glucose.: 176 mg/dL (30 Sep 2019 11:44)  POCT Blood Glucose.: 158 mg/dL (30 Sep 2019 06:50)  POCT Blood Glucose.: 323 mg/dL (29 Sep 2019 21:59)      Insulin Sliding Scale requirements X 24 Hours:    RADIOLOGY & ADDITIONAL TESTS:    A/P: 60 y/o Italian Speaking F former cigarette smoker x 20yrs (quit 2yrs ago), strong FMHX of CAD and PMH of HTN, HLD, DM, history of Carotid Artery Disease s/p R Sided Carotid Endarterectomy s/p MID-CABG.     1.  DM Type 2 - uncontrolled, complicated. Hba1c 9.2  - Please continue lantus 18 units at night.  - Please increase lispro to 9 units before each meal.    - Continue moderate dose sliding scale four times daily with meals and at bedtime  - Carb consistent diet  - Pt's fingerstick glucose goal is 120 to 150    Case d/w Dr. Madrid, [facility] team informed.     REMINDERS FOR INSULIN/DIABETES SUPPLIES at DISCHARGE:  INSULIN:   Long actin/Basal Insulin: Examples: Toujeo, Basaglar, Tresiba, Lantus   Short acting/Bolus Insulin: Humalog, Admelog, Novolog  Please ensure that BOTH short acting and long acting insulin are prescribed in the same preparation (Ex: PEN vs VIAL/SOLUTION)     TESTING SUPPLIES:   All glucometer supplies should be written as generic to avoid issues with insurance. Use the free text option in [facility] prescription writer, and type in glucometer test strips, lancets, etc to order.    If sending patient home on insulin PEN, please send:   •	BD papo insulin pen needles for use up to 4 times daily (total quantity 100)  •	Lancets for use up to 4 times daily (total quantity 100)  •	Glucometer Test strips for use up to 4 times daily (total quantity 100)  •	Alcohol swabs for use up to 4 times daily (total quantity 100)  •	Glucometer (If provided by hospital, still provide scripts for lancets, test strips, and swabs)  If sending patient home on insulin VIAL, please send:   •	Insulin syringes (6mm) - for use up to 4 times daily (total quantity 100)  •	Lancets for use up to 4 times daily (total quantity 100)  •	Generic Glucometer Test strips for use up to 4 times daily (total quantity 100)  •	Alcohol swabs for use up to 4 times daily (total quantity 100)  •	Generic Glucometer (If provided by hospital, still provide scripts for lancets, test strips, and swabs)  •	Do not specify brand for testing supplies (such as contour, freestyle, one touch etc) that way the pharmacy has the freedom to pick and change according to what the insurance dictates.  For patients without insurance:   •	Provide social work with appropriate scripts so they may obtain 1 week of samples  •	Provide with glucometer. Glucometers are located at various nursing stations, the nursing office, education, and endocrine fellows office.  •	Please make appointment with Katarina Champagne NP or Ebony Mercedes RN and SHELLY Romero at the [address] [place] [address] endocrinology clinic. They can see patients without insurance, provide appropriate samples, and assist in getting insurance coverage.     PREFERRED PHARMACY:  [facility] (located on 1st floor next to admitting)  P: [phone]  Hours: M – F 8AM – 8PM, Sat 8AM – 4PM, Sun—closed  If not using [facility], please follow up with chosen pharmacy to ensure insulin prescribed is covered.

## 2019-09-30 NOTE — DIETITIAN INITIAL EVALUATION ADULT. - OTHER INFO
59F former 5-6 cigarette x 20yrs (quit 2yrs ago) with strong FMHx of CAD and PMH of HTN, HLD, DM (A1c 9.2), history of diagnostic cardiac catheterization 2013, Carotid Artery Disease s/p R Sided Carotid Endarterectomy, who presents w/ [facility]. Work up including NST in 8/2019 showed a medium size completely reversible perfusion defect. Cardiac Cath revealed LAD- 100% CT surgery was consulted, pt underwent pre-surgical testing and was deemed a surgical candidate. On 9/27 she underwent robotic MIDCAB. Operation was uncomplicated. She was breifly placed on Cleviprex for htn. She was extubated on POD 0. On POD 1 Endocrinology was consulted for Hga1c 9.2 and fsg in 200s. Today on POD 3 she was deemed stable for transfer to floor. Pt now noted to be tolerating diet without issue. Denies n/v/d/c, chewing/ swallowing issues or pain impacting intake, skin with incision. NKFA, wt noted to be stable. Lethargic at time of visit mild chest pain noted. Plan is for home when stable. Will follow per protocol/ for education upon f/u.

## 2019-09-30 NOTE — DIETITIAN INITIAL EVALUATION ADULT. - ADD RECOMMEND
1. Reinforce ed 2. Manage pain prn 3. Trend wts 1. Reinforce ed 2. Manage pain prn 3. Trend wts 4. Encourage intake through day

## 2019-09-30 NOTE — PROGRESS NOTE ADULT - ASSESSMENT
58 y/o Kiswahili Speaking F former 5-6 cigarette x 20yrs (quit 2yrs ago) with strong FMHx of CAD and PMH of HTN, HLD, DM, history of diagnostic cardiac catheterization @ [facility] in 2013, Carotid Artery Disease s/p R Sided Carotid Endarterectomy, who presents w/ [facility] after 1/2 flight of stairs and does not occur at rest. Work up including NST in 8/2019 showed a medium size completely reversible perfusion defect of moderate intensity involving the mid-apical entire septum and the inferior myocardial walls. Cardiac Cath revealed LAD- 100%  proximal with R-L and L-L collaterals, pD1-  90% stenosis, LCX- mild disease, pOM1- 80% stenosis, pOM2- 80% stenosis, LVEF  60%, CT surgery was consulted, pt underwent pre-surgical testing and was deemed a surgical candidate. On 9/27 she underwent robotic MIDCAB. Operation was uncomplicated. She was breifly placed on Cleviprex for htn. She was extubated on POD 0. On POD 1 Endocrinology was consulted for Hga1c 9.2 and fsg in 200s. On POD 3 she was deemed stable for transfer to floor. Today on POD 4, progressing well. CXR concerning for effusion, bedside US revealed small left effusion,  will diurese.     Neuro: Pain well controlled with current regimen  - No delirium  - Mentating well  - Toradol/Tylenol/Percocet PRN pain  - Lidocaine patch  - Cont escitalopram for anxiety/depression  - Meclizine for dizziness    Respiratory sP02 96% on RA  - CXR pa/lat showing possible left effusion  - Bedside US revealed small left effusion, will give lasix 20mg IV  - Encourage ambulation C+DB and use of IS 10x / hr while awake    Cardiac, S/p MIDCAB  - EF 60%  - Pt in NSR  - Continue ASA, plavix, statin, metoprolol titrated up to 37.5 BID (titrate as tolerated)  - Will go for completion PCI Wednesday    GI: -BM + flatus  - Continue bowel regimen, added bisacodyl  - GI PPX with protonix   - PO diet    Renal/: BUN/Cr (17/0.68)  - Monitor renal function  - Monitor I/Os  - Replete K<4.0, Mg<2.0  - Given lasix 20mg IV for effusion     Heme: H&H (10.1/31.8)  - H&H stable, continue to monitor   - DVT ppx with 5000u SQH, SCD    Endocrinology: Hx of  DM  - A1C 9.2, Endocrinology following, appreciate reccs  - Cont lantus 14, lispro 9, mISS with meals and before bed  - Per Endo will likely go home on insulin   - -170 today  - TSH WNL      ID: afebrile WBC 9.1  - Observe for SIRS/Sepsis Syndrome    Dispo: Home when medically cleared

## 2019-09-30 NOTE — DIETITIAN INITIAL EVALUATION ADULT. - ENERGY NEEDS
Ideal body weight used for calculations as pt >120% of IBW.   ABW 76.2kg, IBW 56kg, 136% IBW, ht 65", BMI 28   Nutrient needs based on [facility] standards of care for maintenance in adults, adjusted for post-op needs, fluid per team

## 2019-09-30 NOTE — PROGRESS NOTE ADULT - SUBJECTIVE AND OBJECTIVE BOX
Patient discussed on morning rounds with       Operation / Date:     SUBJECTIVE ASSESSMENT:  59y Female         Vital Signs Last 24 Hrs  T(C): 36.8 (30 Sep 2019 13:49), Max: 37.3 (30 Sep 2019 01:00)  T(F): 98.3 (30 Sep 2019 13:49), Max: 99.1 (30 Sep 2019 01:00)  HR: 100 (30 Sep 2019 13:00) (86 - 100)  BP: 147/72 (30 Sep 2019 13:00) (102/58 - 147/72)  BP(mean): 103 (30 Sep 2019 13:00) (74 - 103)  RR: 16 (30 Sep 2019 13:00) (16 - 18)  SpO2: 94% (30 Sep 2019 13:00) (94% - 98%)  I&O's Detail    29 Sep 2019 07:01  -  30 Sep 2019 07:00  --------------------------------------------------------  IN:    Oral Fluid: 600 mL  Total IN: 600 mL    OUT:    Voided: 1100 mL  Total OUT: 1100 mL    Total NET: -500 mL          CHEST TUBE:  Yes/No. AIR LEAKS: Yes/No. Suction / H2O SEAL.   JOHNSON DRAIN:  Yes/No.  EPICARDIAL WIRES: Yes/No.  TIE DOWNS: Yes/No.  MESSINA: Yes/No.    PHYSICAL EXAM:    General:     Neurological:    Cardiovascular:    Respiratory:    Gastrointestinal:    Extremities:    Vascular:    Incision Sites:    LABS:                        10.1   9.19  )-----------( 252      ( 30 Sep 2019 06:57 )             31.8       COUMADIN:  Yes/No. REASON: .    PT/INR - ( 29 Sep 2019 00:49 )   PT: 13.4 sec;   INR: 1.18          PTT - ( 29 Sep 2019 00:49 )  PTT:28.8 sec    09-30    139  |  103  |  17  ----------------------------<  172<H>  4.4   |  27  |  0.68    Ca    8.3<L>      30 Sep 2019 06:57  Phos  2.2     09-29  Mg     1.8     09-30    TPro  5.7<L>  /  Alb  3.0<L>  /  TBili  0.2  /  DBili  x   /  AST  11  /  ALT  9<L>  /  AlkPhos  49  09-29          MEDICATIONS  (STANDING):  aspirin  chewable 81 milliGRAM(s) Oral daily  chlorhexidine 2% Cloths 1 Application(s) Topical <User Schedule>  clopidogrel Tablet 75 milliGRAM(s) Oral daily  dextrose 5%. 1000 milliLiter(s) (50 mL/Hr) IV Continuous <Continuous>  dextrose 50% Injectable 12.5 Gram(s) IV Push once  dextrose 50% Injectable 25 Gram(s) IV Push once  dextrose 50% Injectable 25 Gram(s) IV Push once  docusate sodium 100 milliGRAM(s) Oral three times a day  escitalopram 5 milliGRAM(s) Oral daily  heparin  Injectable 5000 Unit(s) SubCutaneous every 8 hours  insulin glargine Injectable (LANTUS) 18 Unit(s) SubCutaneous at bedtime  insulin lispro (HumaLOG) corrective regimen sliding scale   SubCutaneous Before meals and at bedtime  insulin lispro Injectable (HumaLOG) 6 Unit(s) SubCutaneous three times a day before meals  lidocaine   Patch 1 Patch Transdermal every 24 hours  meclizine 25 milliGRAM(s) Oral every 12 hours  metoprolol tartrate 37.5 milliGRAM(s) Oral <User Schedule>  pantoprazole    Tablet 40 milliGRAM(s) Oral before breakfast  polyethylene glycol 3350 17 Gram(s) Oral daily  senna 2 Tablet(s) Oral at bedtime  simvastatin 20 milliGRAM(s) Oral at bedtime  sodium chloride 0.9% lock flush 3 milliLiter(s) IV Push every 8 hours  sodium chloride 0.9%. 1000 milliLiter(s) (10 mL/Hr) IV Continuous <Continuous>    MEDICATIONS  (PRN):  acetaminophen   Tablet .. 650 milliGRAM(s) Oral every 6 hours PRN Temp greater or equal to 38C (100.4F), Mild Pain (1 - 3)  dextrose 40% Gel 15 Gram(s) Oral once PRN Blood Glucose LESS THAN 70 milliGRAM(s)/deciliter  glucagon  Injectable 1 milliGRAM(s) IntraMuscular once PRN Glucose LESS THAN 70 milligrams/deciliter  glucagon  Injectable 1 milliGRAM(s) IntraMuscular Once PRN Glucose LESS THAN 70 milligrams/deciliter  oxyCODONE    5 mG/acetaminophen 325 mG 1 Tablet(s) Oral every 6 hours PRN Moderate Pain (4 - 6)  oxyCODONE    5 mG/acetaminophen 325 mG 2 Tablet(s) Oral every 6 hours PRN Severe Pain (7 - 10)        RADIOLOGY & ADDITIONAL TESTS: Patient discussed on morning rounds with Dr. Arenas    Operation / Date:  9/27/19 MIDCAB EF nml    SUBJECTIVE ASSESSMENT:  Pt seen and examined at the bedside. She states she is still having pain around the surgical site. She is using her IS, pulling 750 today. She was seen ambulating in the halls, she reports having some dizziness when walking, reports a history of dizziness as well. She is tolerating PO diet, passing gas, denies Bm. Denies headache, SOB, STARKEY, N/V/D, abd pain, calf pain, leg swelling         Vital Signs Last 24 Hrs  T(C): 36.8 (30 Sep 2019 13:49), Max: 37.3 (30 Sep 2019 01:00)  T(F): 98.3 (30 Sep 2019 13:49), Max: 99.1 (30 Sep 2019 01:00)  HR: 100 (30 Sep 2019 13:00) (86 - 100)  BP: 147/72 (30 Sep 2019 13:00) (102/58 - 147/72)  BP(mean): 103 (30 Sep 2019 13:00) (74 - 103)  RR: 16 (30 Sep 2019 13:00) (16 - 18)  SpO2: 94% (30 Sep 2019 13:00) (94% - 98%)  I&O's Detail    29 Sep 2019 07:01  -  30 Sep 2019 07:00  --------------------------------------------------------  IN:    Oral Fluid: 600 mL  Total IN: 600 mL    OUT:    Voided: 1100 mL  Total OUT: 1100 mL    Total NET: -500 mL          CHEST TUBE:  no  JOHNSON DRAIN:  No.  EPICARDIAL WIRES: Yes/No.  TIE DOWNS: Yes/No.  MESSINA: Yes/No.    PHYSICAL EXAM:    General:     Neurological:    Cardiovascular:    Respiratory:    Gastrointestinal:    Extremities:    Vascular:    Incision Sites:    LABS:                        10.1   9.19  )-----------( 252      ( 30 Sep 2019 06:57 )             31.8       COUMADIN:  Yes/No. REASON: .    PT/INR - ( 29 Sep 2019 00:49 )   PT: 13.4 sec;   INR: 1.18          PTT - ( 29 Sep 2019 00:49 )  PTT:28.8 sec    09-30    139  |  103  |  17  ----------------------------<  172<H>  4.4   |  27  |  0.68    Ca    8.3<L>      30 Sep 2019 06:57  Phos  2.2     09-29  Mg     1.8     09-30    TPro  5.7<L>  /  Alb  3.0<L>  /  TBili  0.2  /  DBili  x   /  AST  11  /  ALT  9<L>  /  AlkPhos  49  09-29          MEDICATIONS  (STANDING):  aspirin  chewable 81 milliGRAM(s) Oral daily  chlorhexidine 2% Cloths 1 Application(s) Topical <User Schedule>  clopidogrel Tablet 75 milliGRAM(s) Oral daily  dextrose 5%. 1000 milliLiter(s) (50 mL/Hr) IV Continuous <Continuous>  dextrose 50% Injectable 12.5 Gram(s) IV Push once  dextrose 50% Injectable 25 Gram(s) IV Push once  dextrose 50% Injectable 25 Gram(s) IV Push once  docusate sodium 100 milliGRAM(s) Oral three times a day  escitalopram 5 milliGRAM(s) Oral daily  heparin  Injectable 5000 Unit(s) SubCutaneous every 8 hours  insulin glargine Injectable (LANTUS) 18 Unit(s) SubCutaneous at bedtime  insulin lispro (HumaLOG) corrective regimen sliding scale   SubCutaneous Before meals and at bedtime  insulin lispro Injectable (HumaLOG) 6 Unit(s) SubCutaneous three times a day before meals  lidocaine   Patch 1 Patch Transdermal every 24 hours  meclizine 25 milliGRAM(s) Oral every 12 hours  metoprolol tartrate 37.5 milliGRAM(s) Oral <User Schedule>  pantoprazole    Tablet 40 milliGRAM(s) Oral before breakfast  polyethylene glycol 3350 17 Gram(s) Oral daily  senna 2 Tablet(s) Oral at bedtime  simvastatin 20 milliGRAM(s) Oral at bedtime  sodium chloride 0.9% lock flush 3 milliLiter(s) IV Push every 8 hours  sodium chloride 0.9%. 1000 milliLiter(s) (10 mL/Hr) IV Continuous <Continuous>    MEDICATIONS  (PRN):  acetaminophen   Tablet .. 650 milliGRAM(s) Oral every 6 hours PRN Temp greater or equal to 38C (100.4F), Mild Pain (1 - 3)  dextrose 40% Gel 15 Gram(s) Oral once PRN Blood Glucose LESS THAN 70 milliGRAM(s)/deciliter  glucagon  Injectable 1 milliGRAM(s) IntraMuscular once PRN Glucose LESS THAN 70 milligrams/deciliter  glucagon  Injectable 1 milliGRAM(s) IntraMuscular Once PRN Glucose LESS THAN 70 milligrams/deciliter  oxyCODONE    5 mG/acetaminophen 325 mG 1 Tablet(s) Oral every 6 hours PRN Moderate Pain (4 - 6)  oxyCODONE    5 mG/acetaminophen 325 mG 2 Tablet(s) Oral every 6 hours PRN Severe Pain (7 - 10)        RADIOLOGY & ADDITIONAL TESTS: Patient discussed on morning rounds with Dr. Arenas    Operation / Date:  9/27/19 MIDCAB EF nml    SUBJECTIVE ASSESSMENT:  Pt seen and examined at the bedside. She states she is still having pain around the surgical site. She is using her IS, pulling 750 today. She was seen ambulating in the halls, she reports having some dizziness when walking, reports a history of dizziness as well. She is tolerating PO diet, passing gas, denies Bm. Denies headache, SOB, STARKEY, N/V/D, abd pain, calf pain, leg swelling         Vital Signs Last 24 Hrs  T(C): 36.8 (30 Sep 2019 13:49), Max: 37.3 (30 Sep 2019 01:00)  T(F): 98.3 (30 Sep 2019 13:49), Max: 99.1 (30 Sep 2019 01:00)  HR: 100 (30 Sep 2019 13:00) (86 - 100)  BP: 147/72 (30 Sep 2019 13:00) (102/58 - 147/72)  BP(mean): 103 (30 Sep 2019 13:00) (74 - 103)  RR: 16 (30 Sep 2019 13:00) (16 - 18)  SpO2: 94% (30 Sep 2019 13:00) (94% - 98%)  I&O's Detail    29 Sep 2019 07:01  -  30 Sep 2019 07:00  --------------------------------------------------------  IN:    Oral Fluid: 600 mL  Total IN: 600 mL    OUT:    Voided: 1100 mL  Total OUT: 1100 mL    Total NET: -500 mL          CHEST TUBE:  no  JOHNSON DRAIN:  no  EPICARDIAL WIRES yes.  TIE DOWNS: yes  MESSINA: No.    PHYSICAL EXAM:    General: Sitting in chair, NAD    Neurological: A&O x3, face symmetric no focal deficits, strength 5/5 throughout    Cardiovascular: RRR, normal s1 s2, no M/R/G    Respiratory: Non-labored breathing, chest expansion symmetric, CTA b/l, no W/R/R    Gastrointestinal: +BS x4 quadrant, soft, non-tender to palpation, non-distended    Extremities: WWP, no pitting edema, no calf tenderness or erythema    Vascular: 2+radial pulses b/l, 2+DP pulses b/l    Incision: L mini thoracotomy incision CD&I, no drainage no dehiscence. Interdry in place     LABS:                        10.1   9.19  )-----------( 252      ( 30 Sep 2019 06:57 )             31.8       COUMADIN:  no    PT/INR - ( 29 Sep 2019 00:49 )   PT: 13.4 sec;   INR: 1.18          PTT - ( 29 Sep 2019 00:49 )  PTT:28.8 sec    09-30    139  |  103  |  17  ----------------------------<  172<H>  4.4   |  27  |  0.68    Ca    8.3<L>      30 Sep 2019 06:57  Phos  2.2     09-29  Mg     1.8     09-30    TPro  5.7<L>  /  Alb  3.0<L>  /  TBili  0.2  /  DBili  x   /  AST  11  /  ALT  9<L>  /  AlkPhos  49  09-29          MEDICATIONS  (STANDING):  aspirin  chewable 81 milliGRAM(s) Oral daily  chlorhexidine 2% Cloths 1 Application(s) Topical <User Schedule>  clopidogrel Tablet 75 milliGRAM(s) Oral daily  dextrose 5%. 1000 milliLiter(s) (50 mL/Hr) IV Continuous <Continuous>  dextrose 50% Injectable 12.5 Gram(s) IV Push once  dextrose 50% Injectable 25 Gram(s) IV Push once  dextrose 50% Injectable 25 Gram(s) IV Push once  docusate sodium 100 milliGRAM(s) Oral three times a day  escitalopram 5 milliGRAM(s) Oral daily  heparin  Injectable 5000 Unit(s) SubCutaneous every 8 hours  insulin glargine Injectable (LANTUS) 18 Unit(s) SubCutaneous at bedtime  insulin lispro (HumaLOG) corrective regimen sliding scale   SubCutaneous Before meals and at bedtime  insulin lispro Injectable (HumaLOG) 6 Unit(s) SubCutaneous three times a day before meals  lidocaine   Patch 1 Patch Transdermal every 24 hours  meclizine 25 milliGRAM(s) Oral every 12 hours  metoprolol tartrate 37.5 milliGRAM(s) Oral <User Schedule>  pantoprazole    Tablet 40 milliGRAM(s) Oral before breakfast  polyethylene glycol 3350 17 Gram(s) Oral daily  senna 2 Tablet(s) Oral at bedtime  simvastatin 20 milliGRAM(s) Oral at bedtime  sodium chloride 0.9% lock flush 3 milliLiter(s) IV Push every 8 hours  sodium chloride 0.9%. 1000 milliLiter(s) (10 mL/Hr) IV Continuous <Continuous>    MEDICATIONS  (PRN):  acetaminophen   Tablet .. 650 milliGRAM(s) Oral every 6 hours PRN Temp greater or equal to 38C (100.4F), Mild Pain (1 - 3)  dextrose 40% Gel 15 Gram(s) Oral once PRN Blood Glucose LESS THAN 70 milliGRAM(s)/deciliter  glucagon  Injectable 1 milliGRAM(s) IntraMuscular once PRN Glucose LESS THAN 70 milligrams/deciliter  glucagon  Injectable 1 milliGRAM(s) IntraMuscular Once PRN Glucose LESS THAN 70 milligrams/deciliter  oxyCODONE    5 mG/acetaminophen 325 mG 1 Tablet(s) Oral every 6 hours PRN Moderate Pain (4 - 6)  oxyCODONE    5 mG/acetaminophen 325 mG 2 Tablet(s) Oral every 6 hours PRN Severe Pain (7 - 10)        RADIOLOGY & ADDITIONAL TESTS:    < from: Xray Chest 2 Views PA/Lat (09.30.19 @ 09:42) >    Impression: Persistent moderate left pleural effusion. Possible trace   right pleural effusion. Probable bilateral passive atelectasis in the   lower lung zone.    < end of copied text >

## 2019-10-01 ENCOUNTER — OUTPATIENT (OUTPATIENT)
Dept: OUTPATIENT SERVICES | Facility: HOSPITAL | Age: 59
LOS: 1 days | End: 2019-10-01
Payer: MEDICAID

## 2019-10-01 DIAGNOSIS — Z98.890 OTHER SPECIFIED POSTPROCEDURAL STATES: Chronic | ICD-10-CM

## 2019-10-01 LAB
ANION GAP SERPL CALC-SCNC: 11 MMOL/L — SIGNIFICANT CHANGE UP (ref 5–17)
BUN SERPL-MCNC: 21 MG/DL — SIGNIFICANT CHANGE UP (ref 7–23)
CALCIUM SERPL-MCNC: 9 MG/DL — SIGNIFICANT CHANGE UP (ref 8.4–10.5)
CHLORIDE SERPL-SCNC: 100 MMOL/L — SIGNIFICANT CHANGE UP (ref 96–108)
CO2 SERPL-SCNC: 27 MMOL/L — SIGNIFICANT CHANGE UP (ref 22–31)
CREAT SERPL-MCNC: 0.66 MG/DL — SIGNIFICANT CHANGE UP (ref 0.5–1.3)
GLUCOSE BLDC GLUCOMTR-MCNC: 149 MG/DL — HIGH (ref 70–99)
GLUCOSE BLDC GLUCOMTR-MCNC: 191 MG/DL — HIGH (ref 70–99)
GLUCOSE BLDC GLUCOMTR-MCNC: 228 MG/DL — HIGH (ref 70–99)
GLUCOSE BLDC GLUCOMTR-MCNC: 235 MG/DL — HIGH (ref 70–99)
GLUCOSE SERPL-MCNC: 197 MG/DL — HIGH (ref 70–99)
HCT VFR BLD CALC: 31.4 % — LOW (ref 34.5–45)
HGB BLD-MCNC: 9.6 G/DL — LOW (ref 11.5–15.5)
MAGNESIUM SERPL-MCNC: 1.8 MG/DL — SIGNIFICANT CHANGE UP (ref 1.6–2.6)
MCHC RBC-ENTMCNC: 23.1 PG — LOW (ref 27–34)
MCHC RBC-ENTMCNC: 30.6 GM/DL — LOW (ref 32–36)
MCV RBC AUTO: 75.5 FL — LOW (ref 80–100)
NRBC # BLD: 0 /100 WBCS — SIGNIFICANT CHANGE UP (ref 0–0)
PLATELET # BLD AUTO: 271 K/UL — SIGNIFICANT CHANGE UP (ref 150–400)
POTASSIUM SERPL-MCNC: 4 MMOL/L — SIGNIFICANT CHANGE UP (ref 3.5–5.3)
POTASSIUM SERPL-SCNC: 4 MMOL/L — SIGNIFICANT CHANGE UP (ref 3.5–5.3)
RBC # BLD: 4.16 M/UL — SIGNIFICANT CHANGE UP (ref 3.8–5.2)
RBC # FLD: 14.1 % — SIGNIFICANT CHANGE UP (ref 10.3–14.5)
SODIUM SERPL-SCNC: 138 MMOL/L — SIGNIFICANT CHANGE UP (ref 135–145)
WBC # BLD: 7.09 K/UL — SIGNIFICANT CHANGE UP (ref 3.8–10.5)
WBC # FLD AUTO: 7.09 K/UL — SIGNIFICANT CHANGE UP (ref 3.8–10.5)

## 2019-10-01 PROCEDURE — 71046 X-RAY EXAM CHEST 2 VIEWS: CPT | Mod: 26

## 2019-10-01 PROCEDURE — G9001: CPT

## 2019-10-01 PROCEDURE — 71045 X-RAY EXAM CHEST 1 VIEW: CPT | Mod: 26,59

## 2019-10-01 PROCEDURE — 99231 SBSQ HOSP IP/OBS SF/LOW 25: CPT

## 2019-10-01 RX ORDER — INSULIN LISPRO 100/ML
7 VIAL (ML) SUBCUTANEOUS
Refills: 0 | Status: DISCONTINUED | OUTPATIENT
Start: 2019-10-01 | End: 2019-10-03

## 2019-10-01 RX ORDER — INSULIN GLARGINE 100 [IU]/ML
20 INJECTION, SOLUTION SUBCUTANEOUS AT BEDTIME
Refills: 0 | Status: DISCONTINUED | OUTPATIENT
Start: 2019-10-01 | End: 2019-10-03

## 2019-10-01 RX ORDER — FUROSEMIDE 40 MG
20 TABLET ORAL ONCE
Refills: 0 | Status: COMPLETED | OUTPATIENT
Start: 2019-10-01 | End: 2019-10-01

## 2019-10-01 RX ORDER — POTASSIUM CHLORIDE 20 MEQ
20 PACKET (EA) ORAL ONCE
Refills: 0 | Status: COMPLETED | OUTPATIENT
Start: 2019-10-01 | End: 2019-10-01

## 2019-10-01 RX ORDER — MAGNESIUM OXIDE 400 MG ORAL TABLET 241.3 MG
800 TABLET ORAL EVERY 4 HOURS
Refills: 0 | Status: COMPLETED | OUTPATIENT
Start: 2019-10-01 | End: 2019-10-01

## 2019-10-01 RX ADMIN — OXYCODONE AND ACETAMINOPHEN 2 TABLET(S): 5; 325 TABLET ORAL at 02:09

## 2019-10-01 RX ADMIN — Medication 100 MILLIGRAM(S): at 22:11

## 2019-10-01 RX ADMIN — Medication 5 MILLIGRAM(S): at 22:11

## 2019-10-01 RX ADMIN — Medication 9 UNIT(S): at 12:23

## 2019-10-01 RX ADMIN — OXYCODONE AND ACETAMINOPHEN 2 TABLET(S): 5; 325 TABLET ORAL at 01:09

## 2019-10-01 RX ADMIN — Medication 4: at 22:58

## 2019-10-01 RX ADMIN — LIDOCAINE 1 PATCH: 4 CREAM TOPICAL at 22:10

## 2019-10-01 RX ADMIN — LIDOCAINE 1 PATCH: 4 CREAM TOPICAL at 07:08

## 2019-10-01 RX ADMIN — Medication 25 MILLIGRAM(S): at 06:43

## 2019-10-01 RX ADMIN — OXYCODONE AND ACETAMINOPHEN 2 TABLET(S): 5; 325 TABLET ORAL at 16:53

## 2019-10-01 RX ADMIN — Medication 7 UNIT(S): at 17:40

## 2019-10-01 RX ADMIN — CLOPIDOGREL BISULFATE 75 MILLIGRAM(S): 75 TABLET, FILM COATED ORAL at 12:24

## 2019-10-01 RX ADMIN — PANTOPRAZOLE SODIUM 40 MILLIGRAM(S): 20 TABLET, DELAYED RELEASE ORAL at 06:44

## 2019-10-01 RX ADMIN — OXYCODONE AND ACETAMINOPHEN 2 TABLET(S): 5; 325 TABLET ORAL at 19:50

## 2019-10-01 RX ADMIN — Medication 81 MILLIGRAM(S): at 12:23

## 2019-10-01 RX ADMIN — CHLORHEXIDINE GLUCONATE 1 APPLICATION(S): 213 SOLUTION TOPICAL at 06:42

## 2019-10-01 RX ADMIN — SODIUM CHLORIDE 3 MILLILITER(S): 9 INJECTION INTRAMUSCULAR; INTRAVENOUS; SUBCUTANEOUS at 14:45

## 2019-10-01 RX ADMIN — Medication 4: at 12:23

## 2019-10-01 RX ADMIN — Medication 9 UNIT(S): at 07:30

## 2019-10-01 RX ADMIN — HEPARIN SODIUM 5000 UNIT(S): 5000 INJECTION INTRAVENOUS; SUBCUTANEOUS at 06:43

## 2019-10-01 RX ADMIN — HEPARIN SODIUM 5000 UNIT(S): 5000 INJECTION INTRAVENOUS; SUBCUTANEOUS at 14:24

## 2019-10-01 RX ADMIN — MAGNESIUM OXIDE 400 MG ORAL TABLET 800 MILLIGRAM(S): 241.3 TABLET ORAL at 17:28

## 2019-10-01 RX ADMIN — Medication 100 MILLIGRAM(S): at 06:43

## 2019-10-01 RX ADMIN — SODIUM CHLORIDE 3 MILLILITER(S): 9 INJECTION INTRAMUSCULAR; INTRAVENOUS; SUBCUTANEOUS at 22:11

## 2019-10-01 RX ADMIN — Medication 2: at 07:30

## 2019-10-01 RX ADMIN — Medication 20 MILLIEQUIVALENT(S): at 22:11

## 2019-10-01 RX ADMIN — LIDOCAINE 1 PATCH: 4 CREAM TOPICAL at 09:43

## 2019-10-01 RX ADMIN — SENNA PLUS 2 TABLET(S): 8.6 TABLET ORAL at 22:11

## 2019-10-01 RX ADMIN — MAGNESIUM OXIDE 400 MG ORAL TABLET 800 MILLIGRAM(S): 241.3 TABLET ORAL at 09:05

## 2019-10-01 RX ADMIN — Medication 100 MILLIGRAM(S): at 14:25

## 2019-10-01 RX ADMIN — OXYCODONE AND ACETAMINOPHEN 1 TABLET(S): 5; 325 TABLET ORAL at 22:55

## 2019-10-01 RX ADMIN — Medication 20 MILLIEQUIVALENT(S): at 09:05

## 2019-10-01 RX ADMIN — Medication 37.5 MILLIGRAM(S): at 17:28

## 2019-10-01 RX ADMIN — Medication 20 MILLIGRAM(S): at 19:08

## 2019-10-01 RX ADMIN — POLYETHYLENE GLYCOL 3350 17 GRAM(S): 17 POWDER, FOR SOLUTION ORAL at 12:24

## 2019-10-01 RX ADMIN — Medication 37.5 MILLIGRAM(S): at 06:43

## 2019-10-01 RX ADMIN — Medication 25 MILLIGRAM(S): at 17:28

## 2019-10-01 RX ADMIN — OXYCODONE AND ACETAMINOPHEN 1 TABLET(S): 5; 325 TABLET ORAL at 09:52

## 2019-10-01 RX ADMIN — OXYCODONE AND ACETAMINOPHEN 1 TABLET(S): 5; 325 TABLET ORAL at 10:30

## 2019-10-01 RX ADMIN — SODIUM CHLORIDE 3 MILLILITER(S): 9 INJECTION INTRAMUSCULAR; INTRAVENOUS; SUBCUTANEOUS at 06:08

## 2019-10-01 RX ADMIN — HEPARIN SODIUM 5000 UNIT(S): 5000 INJECTION INTRAVENOUS; SUBCUTANEOUS at 22:11

## 2019-10-01 RX ADMIN — ESCITALOPRAM OXALATE 5 MILLIGRAM(S): 10 TABLET, FILM COATED ORAL at 12:24

## 2019-10-01 RX ADMIN — SIMVASTATIN 20 MILLIGRAM(S): 20 TABLET, FILM COATED ORAL at 22:11

## 2019-10-01 RX ADMIN — OXYCODONE AND ACETAMINOPHEN 1 TABLET(S): 5; 325 TABLET ORAL at 22:11

## 2019-10-01 RX ADMIN — INSULIN GLARGINE 20 UNIT(S): 100 INJECTION, SOLUTION SUBCUTANEOUS at 22:10

## 2019-10-01 RX ADMIN — MAGNESIUM OXIDE 400 MG ORAL TABLET 800 MILLIGRAM(S): 241.3 TABLET ORAL at 14:27

## 2019-10-01 NOTE — PROGRESS NOTE ADULT - SUBJECTIVE AND OBJECTIVE BOX
Patient discussed on morning rounds with       Operation / Date: MIDCAB , 9/27/19    SUBJECTIVE ASSESSMENT:  59year old female who reported that she was very tired but agreed to sit out bed and ambulate.  Pt admits to pain at the surgical site.  Denies SOB, tachycardia, dizziness, N/V.        Vital Signs Last 24 Hrs  T(C): 36.6 (01 Oct 2019 18:59), Max: 38.7 (01 Oct 2019 17:58)  T(F): 97.8 (01 Oct 2019 18:59), Max: 101.6 (01 Oct 2019 17:58)  HR: 110 (01 Oct 2019 16:37) (90 - 110)  BP: 152/78 (01 Oct 2019 16:37) (117/71 - 165/79)  BP(mean): 109 (01 Oct 2019 16:37) (85 - 110)  RR: 18 (01 Oct 2019 16:37) (18 - 20)  SpO2: 93% (01 Oct 2019 16:37) (93% - 98%)  I&O's Detail    30 Sep 2019 07:01  -  01 Oct 2019 07:00  --------------------------------------------------------  IN:  Total IN: 0 mL    OUT:    Voided: 800 mL  Total OUT: 800 mL    Total NET: -800 mL      01 Oct 2019 07:01  -  01 Oct 2019 20:16  --------------------------------------------------------  IN:    Oral Fluid: 600 mL  Total IN: 600 mL    OUT:    Voided: 700 mL  Total OUT: 700 mL    Total NET: -100 mL    CHEST TUBE:  no  JOHNSON DRAIN:  no  EPICARDIAL WIRES No  TIE DOWNS: yes  MESSINA: No.      PHYSICAL EXAM:    General: Patient seen OOB in NAD  Neurological:  Alert and oriented x3, no gross deficits appreciated                   Cardiovascular:     RRR, S1S2, no murmur  Respiratory:  equal breath sounds, no rales, no rhonchi, no wheeze  Gastrointestinal:  soft, nontender, positive bowel sounds  Extremities: b/l mild pedal edema  Vascular:    all pulses  intact (radial, femoral, DP/PT)  Incision Sites: Left inframammary incision with no dehiscence, no hematoma covered with dermabond, c/d/i; chest tube site  with tie down in place      LABS:                        9.6    7.09  )-----------( 271      ( 01 Oct 2019 06:27 )             31.4       COUMADIN:  No. REASON: .        10-01    138  |  100  |  21  ----------------------------<  197<H>  4.0   |  27  |  0.66    Ca    9.0      01 Oct 2019 06:27  Mg     1.8     10-01      MEDICATIONS  (STANDING):  aspirin  chewable 81 milliGRAM(s) Oral daily  bisacodyl 5 milliGRAM(s) Oral at bedtime  chlorhexidine 2% Cloths 1 Application(s) Topical <User Schedule>  clopidogrel Tablet 75 milliGRAM(s) Oral daily  dextrose 5%. 1000 milliLiter(s) (50 mL/Hr) IV Continuous <Continuous>  dextrose 50% Injectable 12.5 Gram(s) IV Push once  dextrose 50% Injectable 25 Gram(s) IV Push once  dextrose 50% Injectable 25 Gram(s) IV Push once  docusate sodium 100 milliGRAM(s) Oral three times a day  escitalopram 5 milliGRAM(s) Oral daily  heparin  Injectable 5000 Unit(s) SubCutaneous every 8 hours  insulin glargine Injectable (LANTUS) 20 Unit(s) SubCutaneous at bedtime  insulin lispro (HumaLOG) corrective regimen sliding scale   SubCutaneous Before meals and at bedtime  insulin lispro Injectable (HumaLOG) 7 Unit(s) SubCutaneous three times a day before meals  lidocaine   Patch 1 Patch Transdermal every 24 hours  meclizine 25 milliGRAM(s) Oral every 12 hours  metoprolol tartrate 37.5 milliGRAM(s) Oral <User Schedule>  pantoprazole    Tablet 40 milliGRAM(s) Oral before breakfast  polyethylene glycol 3350 17 Gram(s) Oral daily  senna 2 Tablet(s) Oral at bedtime  simvastatin 20 milliGRAM(s) Oral at bedtime  sodium chloride 0.9% lock flush 3 milliLiter(s) IV Push every 8 hours  sodium chloride 0.9%. 1000 milliLiter(s) (10 mL/Hr) IV Continuous <Continuous>    MEDICATIONS  (PRN):  acetaminophen   Tablet .. 650 milliGRAM(s) Oral every 6 hours PRN Temp greater or equal to 38C (100.4F), Mild Pain (1 - 3)  dextrose 40% Gel 15 Gram(s) Oral once PRN Blood Glucose LESS THAN 70 milliGRAM(s)/deciliter  glucagon  Injectable 1 milliGRAM(s) IntraMuscular once PRN Glucose LESS THAN 70 milligrams/deciliter  glucagon  Injectable 1 milliGRAM(s) IntraMuscular Once PRN Glucose LESS THAN 70 milligrams/deciliter  oxyCODONE    5 mG/acetaminophen 325 mG 1 Tablet(s) Oral every 6 hours PRN Moderate Pain (4 - 6)  oxyCODONE    5 mG/acetaminophen 325 mG 2 Tablet(s) Oral every 6 hours PRN Severe Pain (7 - 10)        RADIOLOGY & ADDITIONAL TESTS:  < from: Xray 10/1/19  Persistent moderate left pleural effusion. Possible trace  right pleural effusion. Probable bilateral passive atelectasis in the  lower lung zone.    < end of copied text >    Bedside US 10/1/19 show small left effusion with poor window

## 2019-10-01 NOTE — PROGRESS NOTE ADULT - SUBJECTIVE AND OBJECTIVE BOX
INTERVAL HPI/OVERNIGHT EVENTS:    Patient is a 59y old  Female who presents with a chief complaint of SOB with abnormal nuclear stress test (30 Sep 2019 18:48)      Pt reports the following symptoms:    CONSTITUTIONAL:  Negative fever or chills, feels well, good appetite  EYES:  Negative  blurry vision or double vision  CARDIOVASCULAR:  Negative for chest pain or palpitations  RESPIRATORY:  Negative for cough, wheezing, or SOB   GASTROINTESTINAL:  Negative for nausea, vomiting, diarrhea, constipation, or abdominal pain  GENITOURINARY:  Negative frequency, urgency or dysuria  NEUROLOGIC:  No headache, confusion, dizziness, lightheadedness    MEDICATIONS  (STANDING):  aspirin  chewable 81 milliGRAM(s) Oral daily  bisacodyl 5 milliGRAM(s) Oral at bedtime  chlorhexidine 2% Cloths 1 Application(s) Topical <User Schedule>  clopidogrel Tablet 75 milliGRAM(s) Oral daily  dextrose 5%. 1000 milliLiter(s) (50 mL/Hr) IV Continuous <Continuous>  dextrose 50% Injectable 12.5 Gram(s) IV Push once  dextrose 50% Injectable 25 Gram(s) IV Push once  dextrose 50% Injectable 25 Gram(s) IV Push once  docusate sodium 100 milliGRAM(s) Oral three times a day  escitalopram 5 milliGRAM(s) Oral daily  heparin  Injectable 5000 Unit(s) SubCutaneous every 8 hours  insulin glargine Injectable (LANTUS) 18 Unit(s) SubCutaneous at bedtime  insulin lispro (HumaLOG) corrective regimen sliding scale   SubCutaneous Before meals and at bedtime  insulin lispro Injectable (HumaLOG) 9 Unit(s) SubCutaneous three times a day before meals  lidocaine   Patch 1 Patch Transdermal every 24 hours  magnesium oxide 800 milliGRAM(s) Oral every 4 hours  meclizine 25 milliGRAM(s) Oral every 12 hours  metoprolol tartrate 37.5 milliGRAM(s) Oral <User Schedule>  pantoprazole    Tablet 40 milliGRAM(s) Oral before breakfast  polyethylene glycol 3350 17 Gram(s) Oral daily  senna 2 Tablet(s) Oral at bedtime  simvastatin 20 milliGRAM(s) Oral at bedtime  sodium chloride 0.9% lock flush 3 milliLiter(s) IV Push every 8 hours  sodium chloride 0.9%. 1000 milliLiter(s) (10 mL/Hr) IV Continuous <Continuous>    MEDICATIONS  (PRN):  acetaminophen   Tablet .. 650 milliGRAM(s) Oral every 6 hours PRN Temp greater or equal to 38C (100.4F), Mild Pain (1 - 3)  dextrose 40% Gel 15 Gram(s) Oral once PRN Blood Glucose LESS THAN 70 milliGRAM(s)/deciliter  glucagon  Injectable 1 milliGRAM(s) IntraMuscular once PRN Glucose LESS THAN 70 milligrams/deciliter  glucagon  Injectable 1 milliGRAM(s) IntraMuscular Once PRN Glucose LESS THAN 70 milligrams/deciliter  oxyCODONE    5 mG/acetaminophen 325 mG 1 Tablet(s) Oral every 6 hours PRN Moderate Pain (4 - 6)  oxyCODONE    5 mG/acetaminophen 325 mG 2 Tablet(s) Oral every 6 hours PRN Severe Pain (7 - 10)      PHYSICAL EXAM  Vital Signs Last 24 Hrs  T(C): 36.9 (01 Oct 2019 09:00), Max: 38.1 (30 Sep 2019 21:55)  T(F): 98.4 (01 Oct 2019 09:00), Max: 100.5 (30 Sep 2019 21:55)  HR: 92 (01 Oct 2019 11:45) (90 - 108)  BP: 119/61 (01 Oct 2019 11:45) (117/71 - 165/79)  BP(mean): 85 (01 Oct 2019 11:45) (85 - 110)  RR: 18 (01 Oct 2019 09:20) (16 - 20)  SpO2: 96% (01 Oct 2019 09:20) (94% - 97%)    Constitutional: wn/wd in NAD.   HEENT: NCAT, MMM, OP clear, EOMI, no proptosis or lid retraction  Neck: no thyromegaly or palpable thyroid nodules   Respiratory: lungs CTAB.  Cardiovascular: regular rhythm, normal S1 and S2, no audible murmurs, no peripheral edema  GI: soft, NT/ND, no masses/HSM appreciated.  Neurology: no tremors, DTR 2+  Skin: no visible rashes/lesions  Psychiatric: AAO x 3, normal affect/mood.    LABS:                        9.6    7.09  )-----------( 271      ( 01 Oct 2019 06:27 )             31.4     10-01    138  |  100  |  21  ----------------------------<  197<H>  4.0   |  27  |  0.66    Ca    9.0      01 Oct 2019 06:27  Mg     1.8     10-01          Thyroid Stimulating Hormone, Serum: 0.723 uIU/mL (09-26 @ 05:48)      HbA1C: 9.2 % (09-26 @ 11:39)  9.2 % (09-26 @ 05:48)  9.1 % (09-25 @ 11:22)    CAPILLARY BLOOD GLUCOSE      POCT Blood Glucose.: 235 mg/dL (01 Oct 2019 11:50)  POCT Blood Glucose.: 191 mg/dL (01 Oct 2019 07:24)  POCT Blood Glucose.: 204 mg/dL (30 Sep 2019 21:41)  POCT Blood Glucose.: 176 mg/dL (30 Sep 2019 17:21)      Insulin Sliding Scale requirements X 24 Hours:    RADIOLOGY & ADDITIONAL TESTS:    A/P: 59y Female with history of DM type II presenting for       1.  DM -     Please continue           units lantus at bedtime  / in the morning and        units lispro with meals and lispro moderate / low dose sliding scale 4 times daily with meals and at bedtime.  Please continue consistent carbohydrate diet.      Goal FSG is   Will continue to monitor   For discharge, pt can continue    Pt can follow up at discharge with [facility] by calling  to make an appointment.   Will discuss case with     and update primary team INTERVAL HPI/OVERNIGHT EVENTS:    Patient seen and examined at the bedside. No acute events overnight. She is being planned for a PCI tomorrow and going to be NPO from midnight.    FSG & Insulin received:  Yesterday:  pre-dinner fs, 6 nutritional lispro   units +  2 units lispro SS, had chicken sandwich with beans  bedtime fs, 18 lantus   units +  4  units lispro SS, no snacks  Today:  pre-breakfast fs, 9 nutritional lispro   units+  2  units lispro SS, had eggs, cereal, milk  pre-lunch fs, 9 nutritional lispro   units+  4 units lispro SS, had fish, potatoes, green beans, soup, coffee      Pt reports the following symptoms:    CONSTITUTIONAL:  Negative fever or chills, feels well, good appetite  EYES:  Negative  blurry vision or double vision  CARDIOVASCULAR:  Negative for chest pain or palpitations  RESPIRATORY:  Negative for cough, wheezing, or SOB   GASTROINTESTINAL:  Negative for nausea, vomiting, diarrhea, constipation, or abdominal pain  GENITOURINARY:  Negative frequency, urgency or dysuria  NEUROLOGIC:  No headache, confusion, dizziness, lightheadedness    MEDICATIONS  (STANDING):  aspirin  chewable 81 milliGRAM(s) Oral daily  bisacodyl 5 milliGRAM(s) Oral at bedtime  chlorhexidine 2% Cloths 1 Application(s) Topical <User Schedule>  clopidogrel Tablet 75 milliGRAM(s) Oral daily  dextrose 5%. 1000 milliLiter(s) (50 mL/Hr) IV Continuous <Continuous>  dextrose 50% Injectable 12.5 Gram(s) IV Push once  dextrose 50% Injectable 25 Gram(s) IV Push once  dextrose 50% Injectable 25 Gram(s) IV Push once  docusate sodium 100 milliGRAM(s) Oral three times a day  escitalopram 5 milliGRAM(s) Oral daily  heparin  Injectable 5000 Unit(s) SubCutaneous every 8 hours  insulin glargine Injectable (LANTUS) 18 Unit(s) SubCutaneous at bedtime  insulin lispro (HumaLOG) corrective regimen sliding scale   SubCutaneous Before meals and at bedtime  insulin lispro Injectable (HumaLOG) 9 Unit(s) SubCutaneous three times a day before meals  lidocaine   Patch 1 Patch Transdermal every 24 hours  magnesium oxide 800 milliGRAM(s) Oral every 4 hours  meclizine 25 milliGRAM(s) Oral every 12 hours  metoprolol tartrate 37.5 milliGRAM(s) Oral <User Schedule>  pantoprazole    Tablet 40 milliGRAM(s) Oral before breakfast  polyethylene glycol 3350 17 Gram(s) Oral daily  senna 2 Tablet(s) Oral at bedtime  simvastatin 20 milliGRAM(s) Oral at bedtime  sodium chloride 0.9% lock flush 3 milliLiter(s) IV Push every 8 hours  sodium chloride 0.9%. 1000 milliLiter(s) (10 mL/Hr) IV Continuous <Continuous>    MEDICATIONS  (PRN):  acetaminophen   Tablet .. 650 milliGRAM(s) Oral every 6 hours PRN Temp greater or equal to 38C (100.4F), Mild Pain (1 - 3)  dextrose 40% Gel 15 Gram(s) Oral once PRN Blood Glucose LESS THAN 70 milliGRAM(s)/deciliter  glucagon  Injectable 1 milliGRAM(s) IntraMuscular once PRN Glucose LESS THAN 70 milligrams/deciliter  glucagon  Injectable 1 milliGRAM(s) IntraMuscular Once PRN Glucose LESS THAN 70 milligrams/deciliter  oxyCODONE    5 mG/acetaminophen 325 mG 1 Tablet(s) Oral every 6 hours PRN Moderate Pain (4 - 6)  oxyCODONE    5 mG/acetaminophen 325 mG 2 Tablet(s) Oral every 6 hours PRN Severe Pain (7 - 10)      PHYSICAL EXAM  Vital Signs Last 24 Hrs  T(C): 36.9 (01 Oct 2019 09:00), Max: 38.1 (30 Sep 2019 21:55)  T(F): 98.4 (01 Oct 2019 09:00), Max: 100.5 (30 Sep 2019 21:55)  HR: 92 (01 Oct 2019 11:45) (90 - 108)  BP: 119/61 (01 Oct 2019 11:45) (117/71 - 165/79)  BP(mean): 85 (01 Oct 2019 11:45) (85 - 110)  RR: 18 (01 Oct 2019 09:20) (16 - 20)  SpO2: 96% (01 Oct 2019 09:20) (94% - 97%)    Constitutional: wn/wd in NAD.   Respiratory: lungs CTAB.  Cardiovascular: regular rhythm, normal S1 and S2, no peripheral edema  GI: soft, NT/ND, no masses/HSM appreciated.  Neurology: no tremors, no focal neurological deficits    LABS:                        9.6    7.09  )-----------( 271      ( 01 Oct 2019 06:27 )             31.4     10    138  |  100  |  21  ----------------------------<  197<H>  4.0   |  27  |  0.66    Ca    9.0      01 Oct 2019 06:27  Mg     1.8     10-01          Thyroid Stimulating Hormone, Serum: 0.723 uIU/mL ( @ 05:48)      HbA1C: 9.2 % ( @ 11:39)  9.2 % ( @ 05:48)  9.1 % ( @ 11:22)    CAPILLARY BLOOD GLUCOSE      POCT Blood Glucose.: 235 mg/dL (01 Oct 2019 11:50)  POCT Blood Glucose.: 191 mg/dL (01 Oct 2019 07:24)  POCT Blood Glucose.: 204 mg/dL (30 Sep 2019 21:41)  POCT Blood Glucose.: 176 mg/dL (30 Sep 2019 17:21)      Insulin Sliding Scale requirements X 24 Hours:    RADIOLOGY & ADDITIONAL TESTS:    A/P: 60 y/o Hebrew Speaking F former cigarette smoker x 20yrs (quit 2yrs ago), strong FMHX of CAD and PMH of HTN, HLD, DM, history of Carotid Artery Disease s/p R Sided Carotid Endarterectomy s/p MID-CABG.     1.  DM Type 2 - uncontrolled, complicated. Hba1c 9.2  - Please increase to lantus 20 units at night.  - Please decrease lispro to 7 units before each meal.    - Continue moderate dose sliding scale four times daily with meals and at bedtime  - Carb consistent diet  - Pt's fingerstick glucose goal is 120 to 150    Case d/w Dr. Sheridan, CTS team informed.     REMINDERS FOR INSULIN/DIABETES SUPPLIES at DISCHARGE:  INSULIN:   Long actin/Basal Insulin: Examples: Toujeo, Basaglar, Tresiba, Lantus   Short acting/Bolus Insulin: Humalog, Admelog, Novolog  Please ensure that BOTH short acting and long acting insulin are prescribed in the same preparation (Ex: PEN vs VIAL/SOLUTION)     TESTING SUPPLIES:   All glucometer supplies should be written as generic to avoid issues with insurance. Use the free text option in [employer]rise prescription writer, and type in glucometer test strips, lancets, etc to order.    If sending patient home on insulin PEN, please send:   •	BD papo insulin pen needles for use up to 4 times daily (total quantity 100)  •	Lancets for use up to 4 times daily (total quantity 100)  •	Glucometer Test strips for use up to 4 times daily (total quantity 100)  •	Alcohol swabs for use up to 4 times daily (total quantity 100)  •	Glucometer (If provided by hospital, still provide scripts for lancets, test strips, and swabs)  If sending patient home on insulin VIAL, please send:   •	Insulin syringes (6mm) - for use up to 4 times daily (total quantity 100)  •	Lancets for use up to 4 times daily (total quantity 100)  •	Generic Glucometer Test strips for use up to 4 times daily (total quantity 100)  •	Alcohol swabs for use up to 4 times daily (total quantity 100)  •	Generic Glucometer (If provided by hospital, still provide scripts for lancets, test strips, and swabs)  •	Do not specify brand for testing supplies (such as contour, freestyle, one touch etc) that way the pharmacy has the freedom to pick and change according to what the insurance dictates.  For patients without insurance:   •	Provide social work with appropriate scripts so they may obtain 1 week of samples  •	Provide with glucometer. Glucometers are located at various nursing stations, the nursing office, education, and endocrine fellows office.  •	Please make appointment with Katarina Champagne NP or Ebony Mercedes RN and SHELLY Romero at the [address] [place] [address] endocrinology clinic. They can see patients without insurance, provide appropriate samples, and assist in getting insurance coverage.     PREFERRED PHARMACY:  [facility] (located on 1st floor next to admitting)  P: [phone]  Hours: M – F 8AM – 8PM, Sat 8AM – 4PM, Sun—closed  If not using [facility], please follow up with chosen pharmacy to ensure insulin prescribed is covered.

## 2019-10-01 NOTE — PROGRESS NOTE ADULT - ASSESSMENT
59 year old Khmer Speaking F former 5-6 cigarette x 20yrs (quit 2yrs ago) with strong FMHx of CAD and PMH of HTN, HLD, DM, history of diagnostic cardiac catheterization @ [facility] in 2013, Carotid Artery Disease s/p R Sided Carotid Endarterectomy, who presents w/ STARKEY after 1/2 flight of stairs and does not occur at rest. Work up including NST in 8/2019 showed a medium size completely reversible perfusion defect of moderate intensity involving the mid-apical entire septum and the inferior myocardial walls. Cardiac Cath revealed LAD- 100%  proximal with R-L and L-L collaterals, pD1-  90% stenosis, LCX- mild disease, pOM1- 80% stenosis, pOM2- 80% stenosis, LVEF  60%, CT surgery was consulted, pt underwent pre-surgical testing and was deemed a surgical candidate. On 9/27 she underwent robotic MIDCAB. Operation was uncomplicated. She was breifly placed on Cleviprex for htn. She was extubated on POD 0. On POD 1 Endocrinology was consulted for Hga1c 9.2 and fsg in 200s. On POD 2 she was deemed stable for transfer to floor. Today on POD 4, progressing well. CXR concerning for effusion, bedside US revealed stable small left effusion being diuresed.    Neurology   -Pain well controlled with current regimen  - No delirium  - Mentating well  - Toradol/Tylenol/Percocet PRN pain  - Lidocaine patch  - Cont escitalopram for anxiety/depression  - Meclizine for dizziness      Cardiac, S/p MIDCAB  - EF 60%  - Pt in NSR  - Continue ASA, plavix, statin, metoprolol titrated up to 37.5 BID (titrate as tolerated)  - Will go for completion PCI Wednesday-NPO at [facility]    Respiratory sP02 93-94% on RA  - 2nd CXR pa/lat showing possible left effusion  - Bedside US reveal stable small left effusion, given lasix 20mg IV  - Encourage ambulation C+DB and use of IS 10x / hr while awake    GI: -BM + flatus  - Continue bowel regimen, added bisacodyl  - GI PPX with protonix   - PO diet  -NPO at [facility]    Renal/: BUN/Cr (17/0.68)  - Monitor renal function  - Monitor I/Os  - Replete K<4.0, Mg<2.0  - Given lasix 20mg IV for effusion     Heme: H&H (10.1/31.8)  - H&H stable, continue to monitor   - DVT ppx with 5000u SQH, SCD    Endocrinology: Hx of  DM  - A1C 9.2, Endocrinology following, appreciate reccs  - increase lantus 20, decrease lispro 7, ISS with meals and before bed  - Per Endo will likely go home on insulin   - -170 today  - TSH WNL      ID: afebrile WBC 7  - Observe for SIRS/Sepsis Syndrome    Dispo: Home when medically cleared after PCI

## 2019-10-02 LAB
ANION GAP SERPL CALC-SCNC: 11 MMOL/L — SIGNIFICANT CHANGE UP (ref 5–17)
APTT BLD: 29.7 SEC — SIGNIFICANT CHANGE UP (ref 27.5–36.3)
BUN SERPL-MCNC: 23 MG/DL — SIGNIFICANT CHANGE UP (ref 7–23)
CALCIUM SERPL-MCNC: 9.2 MG/DL — SIGNIFICANT CHANGE UP (ref 8.4–10.5)
CHLORIDE SERPL-SCNC: 100 MMOL/L — SIGNIFICANT CHANGE UP (ref 96–108)
CO2 SERPL-SCNC: 27 MMOL/L — SIGNIFICANT CHANGE UP (ref 22–31)
CREAT SERPL-MCNC: 0.78 MG/DL — SIGNIFICANT CHANGE UP (ref 0.5–1.3)
GLUCOSE BLDC GLUCOMTR-MCNC: 137 MG/DL — HIGH (ref 70–99)
GLUCOSE BLDC GLUCOMTR-MCNC: 138 MG/DL — HIGH (ref 70–99)
GLUCOSE BLDC GLUCOMTR-MCNC: 169 MG/DL — HIGH (ref 70–99)
GLUCOSE BLDC GLUCOMTR-MCNC: 217 MG/DL — HIGH (ref 70–99)
GLUCOSE BLDC GLUCOMTR-MCNC: 250 MG/DL — HIGH (ref 70–99)
GLUCOSE SERPL-MCNC: 224 MG/DL — HIGH (ref 70–99)
HCT VFR BLD CALC: 32.1 % — LOW (ref 34.5–45)
HGB BLD-MCNC: 9.8 G/DL — LOW (ref 11.5–15.5)
INR BLD: 1.03 — SIGNIFICANT CHANGE UP (ref 0.88–1.16)
MAGNESIUM SERPL-MCNC: 1.7 MG/DL — SIGNIFICANT CHANGE UP (ref 1.6–2.6)
MCHC RBC-ENTMCNC: 23.1 PG — LOW (ref 27–34)
MCHC RBC-ENTMCNC: 30.5 GM/DL — LOW (ref 32–36)
MCV RBC AUTO: 75.7 FL — LOW (ref 80–100)
NRBC # BLD: 0 /100 WBCS — SIGNIFICANT CHANGE UP (ref 0–0)
PHOSPHATE SERPL-MCNC: 4.3 MG/DL — SIGNIFICANT CHANGE UP (ref 2.5–4.5)
PLATELET # BLD AUTO: 298 K/UL — SIGNIFICANT CHANGE UP (ref 150–400)
POTASSIUM SERPL-MCNC: 4.9 MMOL/L — SIGNIFICANT CHANGE UP (ref 3.5–5.3)
POTASSIUM SERPL-SCNC: 4.9 MMOL/L — SIGNIFICANT CHANGE UP (ref 3.5–5.3)
PROTHROM AB SERPL-ACNC: 11.7 SEC — SIGNIFICANT CHANGE UP (ref 10–12.9)
RBC # BLD: 4.24 M/UL — SIGNIFICANT CHANGE UP (ref 3.8–5.2)
RBC # FLD: 14.2 % — SIGNIFICANT CHANGE UP (ref 10.3–14.5)
SODIUM SERPL-SCNC: 138 MMOL/L — SIGNIFICANT CHANGE UP (ref 135–145)
WBC # BLD: 9.36 K/UL — SIGNIFICANT CHANGE UP (ref 3.8–10.5)
WBC # FLD AUTO: 9.36 K/UL — SIGNIFICANT CHANGE UP (ref 3.8–10.5)

## 2019-10-02 PROCEDURE — 71046 X-RAY EXAM CHEST 2 VIEWS: CPT | Mod: 26

## 2019-10-02 PROCEDURE — 76604 US EXAM CHEST: CPT | Mod: 26

## 2019-10-02 RX ORDER — SODIUM CHLORIDE 9 MG/ML
1000 INJECTION INTRAMUSCULAR; INTRAVENOUS; SUBCUTANEOUS
Refills: 0 | Status: DISCONTINUED | OUTPATIENT
Start: 2019-10-02 | End: 2019-10-02

## 2019-10-02 RX ORDER — FUROSEMIDE 40 MG
20 TABLET ORAL ONCE
Refills: 0 | Status: COMPLETED | OUTPATIENT
Start: 2019-10-02 | End: 2019-10-02

## 2019-10-02 RX ORDER — SODIUM CHLORIDE 9 MG/ML
500 INJECTION INTRAMUSCULAR; INTRAVENOUS; SUBCUTANEOUS
Refills: 0 | Status: DISCONTINUED | OUTPATIENT
Start: 2019-10-02 | End: 2019-10-03

## 2019-10-02 RX ORDER — METOPROLOL TARTRATE 50 MG
37.5 TABLET ORAL EVERY 6 HOURS
Refills: 0 | Status: DISCONTINUED | OUTPATIENT
Start: 2019-10-02 | End: 2019-10-03

## 2019-10-02 RX ORDER — FUROSEMIDE 40 MG
40 TABLET ORAL DAILY
Refills: 0 | Status: DISCONTINUED | OUTPATIENT
Start: 2019-10-02 | End: 2019-10-03

## 2019-10-02 RX ADMIN — Medication 100 MILLIGRAM(S): at 22:01

## 2019-10-02 RX ADMIN — SODIUM CHLORIDE 75 MILLILITER(S): 9 INJECTION INTRAMUSCULAR; INTRAVENOUS; SUBCUTANEOUS at 17:40

## 2019-10-02 RX ADMIN — Medication 7 UNIT(S): at 17:40

## 2019-10-02 RX ADMIN — INSULIN GLARGINE 20 UNIT(S): 100 INJECTION, SOLUTION SUBCUTANEOUS at 22:26

## 2019-10-02 RX ADMIN — SIMVASTATIN 20 MILLIGRAM(S): 20 TABLET, FILM COATED ORAL at 22:01

## 2019-10-02 RX ADMIN — OXYCODONE AND ACETAMINOPHEN 2 TABLET(S): 5; 325 TABLET ORAL at 23:15

## 2019-10-02 RX ADMIN — HEPARIN SODIUM 5000 UNIT(S): 5000 INJECTION INTRAVENOUS; SUBCUTANEOUS at 22:00

## 2019-10-02 RX ADMIN — Medication 37.5 MILLIGRAM(S): at 05:47

## 2019-10-02 RX ADMIN — Medication 2: at 12:20

## 2019-10-02 RX ADMIN — HEPARIN SODIUM 5000 UNIT(S): 5000 INJECTION INTRAVENOUS; SUBCUTANEOUS at 05:47

## 2019-10-02 RX ADMIN — OXYCODONE AND ACETAMINOPHEN 2 TABLET(S): 5; 325 TABLET ORAL at 22:26

## 2019-10-02 RX ADMIN — LIDOCAINE 1 PATCH: 4 CREAM TOPICAL at 22:00

## 2019-10-02 RX ADMIN — Medication 4: at 22:25

## 2019-10-02 RX ADMIN — SENNA PLUS 2 TABLET(S): 8.6 TABLET ORAL at 22:01

## 2019-10-02 RX ADMIN — Medication 100 MILLIGRAM(S): at 05:48

## 2019-10-02 RX ADMIN — Medication 4: at 07:38

## 2019-10-02 RX ADMIN — POLYETHYLENE GLYCOL 3350 17 GRAM(S): 17 POWDER, FOR SOLUTION ORAL at 12:23

## 2019-10-02 RX ADMIN — CHLORHEXIDINE GLUCONATE 1 APPLICATION(S): 213 SOLUTION TOPICAL at 05:46

## 2019-10-02 RX ADMIN — Medication 37.5 MILLIGRAM(S): at 17:39

## 2019-10-02 RX ADMIN — Medication 81 MILLIGRAM(S): at 12:21

## 2019-10-02 RX ADMIN — SODIUM CHLORIDE 3 MILLILITER(S): 9 INJECTION INTRAMUSCULAR; INTRAVENOUS; SUBCUTANEOUS at 05:46

## 2019-10-02 RX ADMIN — Medication 20 MILLIGRAM(S): at 12:21

## 2019-10-02 RX ADMIN — CLOPIDOGREL BISULFATE 75 MILLIGRAM(S): 75 TABLET, FILM COATED ORAL at 12:21

## 2019-10-02 RX ADMIN — PANTOPRAZOLE SODIUM 40 MILLIGRAM(S): 20 TABLET, DELAYED RELEASE ORAL at 06:34

## 2019-10-02 RX ADMIN — Medication 5 MILLIGRAM(S): at 22:01

## 2019-10-02 RX ADMIN — LIDOCAINE 1 PATCH: 4 CREAM TOPICAL at 06:34

## 2019-10-02 RX ADMIN — OXYCODONE AND ACETAMINOPHEN 2 TABLET(S): 5; 325 TABLET ORAL at 13:00

## 2019-10-02 RX ADMIN — OXYCODONE AND ACETAMINOPHEN 2 TABLET(S): 5; 325 TABLET ORAL at 12:22

## 2019-10-02 RX ADMIN — Medication 7 UNIT(S): at 07:38

## 2019-10-02 RX ADMIN — Medication 25 MILLIGRAM(S): at 17:39

## 2019-10-02 RX ADMIN — Medication 25 MILLIGRAM(S): at 05:47

## 2019-10-02 RX ADMIN — SODIUM CHLORIDE 3 MILLILITER(S): 9 INJECTION INTRAMUSCULAR; INTRAVENOUS; SUBCUTANEOUS at 14:13

## 2019-10-02 RX ADMIN — LIDOCAINE 1 PATCH: 4 CREAM TOPICAL at 14:12

## 2019-10-02 RX ADMIN — ESCITALOPRAM OXALATE 5 MILLIGRAM(S): 10 TABLET, FILM COATED ORAL at 12:23

## 2019-10-02 RX ADMIN — SODIUM CHLORIDE 3 MILLILITER(S): 9 INJECTION INTRAMUSCULAR; INTRAVENOUS; SUBCUTANEOUS at 22:01

## 2019-10-02 NOTE — PRE-OP CHECKLIST - SELECT TESTS ORDERED
INR/Hepatic Function/Type and Screen/POCT Blood Glucose/BMP/CBC/PT/PTT/EKG
BMP/INR/Type and Screen/PT/PTT/Urinalysis/CBC/CMP/CXR

## 2019-10-02 NOTE — PRE-OP CHECKLIST - HEIGHT IN CM
How to contact your care team: [phone] Pharmacy [phone]   OZ LYNN MD KATYA GEORGIEV, PA-C CHRIS JONES, PA-C NAM HO, MD JONATHAN BATES, MD ARVIN VOCAL, MD    Clinic hours M-Th 7am-7pm Fri 7am-5pm.   Urgent care M-F 11am-9pm  Sat/Sun 9am-5pm.   Pharmacy   Mon-Th:  8:00am-8pm   Fri:  8:00am-6:00pm  Sat/Sun  8:00am-5:00 pm       What Is Impingement Syndrome?  Shoulder pain when raising your arm may mean you have impingement syndrome. This is pinching within your shoulder. The problem may have been caused by repeating an overhead motion. In some cases, you may feel a nagging pain even when you re not using your shoulder.     A forceful action repeated day after day without rest can cause a repetitive motion injury (RMI). Shoulder impingement is often due to an RMI.      Symptoms of impingement  You may feel pain, pinching, or stiffness in your shoulder. Pain often comes with movement. But you may also feel it when you re not using your shoulder. For example, you may feel pain while trying to sleep.    Causes of impingement  Shoulder impingement is often caused by making repeated overhead movements. Constant shoulder use can irritate the tendons and bursa, leading to swelling. Swollen parts of the shoulder take up more room, making the joint space smaller:    Bursitis is inflammation of the bursa, a sac of fluid that cushions shoulder parts as they move. The bursa fills up with too much fluid, filling and squeezing the joint space.    Tendinitis is inflammation of the tendons, fibrous tissues that connect muscle to bone.    Bone problems can make impingement worse. The acromion is part of the shoulder bone. It may be flat or hooked. If your acromion is hooked, the joint space may be smaller than normal. This makes you more prone to shoulder problems. Bone spurs (growths on the bone) can also narrow the joint space.        [id number] [employer]. [address] [place] [address] 
[place] [address]. All rights reserved. This information is not intended as a substitute for professional medical care. Always follow your healthcare professional's instructions.        Shoulder Pain with Uncertain Cause  Shoulder pain can have many causes. Pain often comes from the structures that surround the shoulder joint. These are the joint capsule, ligaments, tendons, muscles, and bursa. Pain can also come from cartilage in the joint. Cartilage can become worn out or injured. It s important to know what s causing your pain so the health care provider can use the correct treatment. But sometimes it s difficult to find the exact cause of shoulder pain. You may need to see a specialist (orthopedist). You may also need special tests such as a CT scan or MRI. The provider may need to use special tools to look inside the joint (arthroscopy).  Shoulder pain can be treated with a sling or a device that keeps your shoulder from moving. You can take an anti-inflammatory medicine such as ibuprofen to ease pain. You may need to do special shoulder exercises. Follow-up with a specialist if the pain is severe or doesn t go away after a few weeks.  Home care  Follow these tips when caring for yourself at home:    If a sling was given to you, leave it in place for the time advised by your health care provider. If you aren t sure how long to wear it, ask for advice. If the sling becomes loose, adjust it so that your forearm is level with the ground. Your shoulder should feel well supported.    Put an ice pack on the injured area for 20 minutes every 1 to 2 hours the first day. You can make your own ice pack by putting ice cubes in a plastic bag. Wrap the bag in a thin towel. Continue with ice packs 3 to 4 times a day for the next 2 days. Then use the pack as needed to ease pain and swelling.    You may use acetaminophen or ibuprofen to control pain, unless another pain medicine was prescribed. If you have chronic 
liver or kidney disease, talk with your health care provider before using these medicines. Also talk with your provider if you ve had a stomach ulcer or GI bleeding.    Shoulder pain may seem worse at night, when there is less to distract you from the pain. If you sleep on your side, try to keep weight off your painful shoulder. Propping pillows behind you may stop you from rolling over onto that shoulder during sleep.     Shoulder joints can become stiff if left in a sling for too long. You should start range of motion exercises about 10 days after the injury. Talk with your provider to find out what type of exercises to do and how soon to start.    You can take the sling off to shower or bathe.  Follow-up care  Follow up with your health care provider if you don t start to get better in the next 5 days.  When to seek medical advice  Call your health care provider right away if any of these occur:    Pain or swelling gets worse or continues for more than a few days    Your hand or fingers become cold, blue, numb, or tingly    Large amount of bruising on your shoulder or upper arm    Difficulty moving your hand or fingers    Weakness in your hand or fingers    Your shoulder becomes stiff    It feels like your shoulder is popping out    You are less able to do your daily activities       [id number] [employer]. [address] [place] [address], [place] [address]. All rights reserved. This information is not intended as a substitute for professional medical care. Always follow your healthcare professional's instructions.        
165.1
165

## 2019-10-02 NOTE — PROGRESS NOTE ADULT - ATTENDING COMMENTS
The patient was seen and examined at the bedside with Dr. Starr,Endocrinology Fellow. Glucose is not adequately controlled so I agree to increase Glargine Insulin to 18 units and premeal Lispro Insulin to 6 units.
Patient seen and evaluated at the bedside with Dr. Castillo,endocrinology Fellow.Hgb A1C on admission was 11.6%. He has been off the Insulin drip since 9 AM and was given bridging NPH Insulin this AM. Glucose was 271 mg % last night-184 this Am then 220 mg%. I agree with Lantus 14 units and premeal Humalog Insulin 3 units.
Pt seen on rounds this afternoon and events of the weekend reviewed.  Pt's son was again at the bedside--plans discussed with him, and he also served as .  Glucoses were quite high yesterday--3/4 values > 200--but improved today to 158/176.  Given the sharp drop overnight, will stay with 18 units of Lantus.  Will increase the pre-meal lispro to 9 units with her PO intake improved.  Explained to the son and pt that she will almost certainly need to go home on insulin, and can possibly be transitioned back to oral agents.  Although he claimed that her fingersticks were only moderately elevated at home (< 180), her A1c level was > 9% and she was clearly missing some intervals of hyperglycemia with her monitoring.  It is possible that a decrease in her starch intake at lunch, and her fruit at dinner would solve the problem, but this would have to be determined post discharge.
THe patient was seen and evaluated at the bedside with Dr. Bains,Endocrinology Fellow.PCI is pending for tomorrow.Glucose resuklts were 158-176-204 last night and 191-235 today. I agree with increasing glargine Insulin to 20 units with 7 units premeal Lispro Insulin.
Patient was seen and examined at the bedside, with endocrinology Fellow Dr. Bains. She had a fever and some questionable URI complaints last night. Glucose levels have been 228 last night-217 Fasting today and 169 before lunch. I agree with maintenance of current Insulin regimen.

## 2019-10-02 NOTE — PROGRESS NOTE ADULT - SUBJECTIVE AND OBJECTIVE BOX
INTERVAL HPI/OVERNIGHT EVENTS:    Patient seen and examined at the bedside. She is being planned for PCI completion today.    FSG & Insulin received:  Yesterday:  pre-dinner fs, 7 nutritional lispro   units  bedtime fs, 20 lantus   units +  4  units lispro SS  Today:  pre-breakfast fs, 7 nutritional lispro   units+ 4   units lispro SS  pre-lunch fsg:  nutritional lispro   units+   units lispro SS      Pt reports the following symptoms:    CONSTITUTIONAL:  Negative fever or chills, feels well, good appetite  EYES:  Negative  blurry vision or double vision  CARDIOVASCULAR:  Negative for chest pain or palpitations  RESPIRATORY:  Negative for cough, wheezing, or SOB   GASTROINTESTINAL:  Negative for nausea, vomiting, diarrhea, constipation, or abdominal pain  GENITOURINARY:  Negative frequency, urgency or dysuria  NEUROLOGIC:  No headache, confusion, dizziness, lightheadedness    MEDICATIONS  (STANDING):  aspirin  chewable 81 milliGRAM(s) Oral daily  bisacodyl 5 milliGRAM(s) Oral at bedtime  chlorhexidine 2% Cloths 1 Application(s) Topical <User Schedule>  clopidogrel Tablet 75 milliGRAM(s) Oral daily  dextrose 5%. 1000 milliLiter(s) (50 mL/Hr) IV Continuous <Continuous>  dextrose 50% Injectable 12.5 Gram(s) IV Push once  dextrose 50% Injectable 25 Gram(s) IV Push once  dextrose 50% Injectable 25 Gram(s) IV Push once  docusate sodium 100 milliGRAM(s) Oral three times a day  escitalopram 5 milliGRAM(s) Oral daily  heparin  Injectable 5000 Unit(s) SubCutaneous every 8 hours  insulin glargine Injectable (LANTUS) 20 Unit(s) SubCutaneous at bedtime  insulin lispro (HumaLOG) corrective regimen sliding scale   SubCutaneous Before meals and at bedtime  insulin lispro Injectable (HumaLOG) 7 Unit(s) SubCutaneous three times a day before meals  lidocaine   Patch 1 Patch Transdermal every 24 hours  meclizine 25 milliGRAM(s) Oral every 12 hours  metoprolol tartrate 37.5 milliGRAM(s) Oral <User Schedule>  pantoprazole    Tablet 40 milliGRAM(s) Oral before breakfast  polyethylene glycol 3350 17 Gram(s) Oral daily  senna 2 Tablet(s) Oral at bedtime  simvastatin 20 milliGRAM(s) Oral at bedtime  sodium chloride 0.9% lock flush 3 milliLiter(s) IV Push every 8 hours  sodium chloride 0.9%. 1000 milliLiter(s) (10 mL/Hr) IV Continuous <Continuous>    MEDICATIONS  (PRN):  acetaminophen   Tablet .. 650 milliGRAM(s) Oral every 6 hours PRN Temp greater or equal to 38C (100.4F), Mild Pain (1 - 3)  dextrose 40% Gel 15 Gram(s) Oral once PRN Blood Glucose LESS THAN 70 milliGRAM(s)/deciliter  glucagon  Injectable 1 milliGRAM(s) IntraMuscular once PRN Glucose LESS THAN 70 milligrams/deciliter  glucagon  Injectable 1 milliGRAM(s) IntraMuscular Once PRN Glucose LESS THAN 70 milligrams/deciliter  oxyCODONE    5 mG/acetaminophen 325 mG 1 Tablet(s) Oral every 6 hours PRN Moderate Pain (4 - 6)  oxyCODONE    5 mG/acetaminophen 325 mG 2 Tablet(s) Oral every 6 hours PRN Severe Pain (7 - 10)      PHYSICAL EXAM  Vital Signs Last 24 Hrs  T(C): 36.4 (02 Oct 2019 05:01), Max: 38.7 (01 Oct 2019 17:58)  T(F): 97.5 (02 Oct 2019 05:01), Max: 101.6 (01 Oct 2019 17:58)  HR: 106 (02 Oct 2019 05:19) (90 - 110)  BP: 132/75 (02 Oct 2019 05:19) (111/65 - 152/78)  BP(mean): 95 (02 Oct 2019 05:19) (81 - 109)  RR: 18 (02 Oct 2019 05:19) (18 - 18)  SpO2: 96% (02 Oct 2019 05:19) (93% - 98%)    Constitutional: wn/wd in NAD.   Respiratory: lungs CTAB.  Cardiovascular: regular rhythm, normal S1 and S2, no peripheral edema  GI: soft, NT/ND, no masses/HSM appreciated.  Neurology: no tremors, no focal neurological deficits    LABS:                        9.8    9.36  )-----------( 298      ( 02 Oct 2019 06:14 )             32.1     10-02    138  |  100  |  23  ----------------------------<  224<H>  4.9   |  27  |  0.78    Ca    9.2      02 Oct 2019 06:14  Phos  4.3     10-02  Mg     1.7     10-02      PT/INR - ( 02 Oct 2019 06:14 )   PT: 11.7 sec;   INR: 1.03          PTT - ( 02 Oct 2019 06:14 )  PTT:29.7 sec    Thyroid Stimulating Hormone, Serum: 0.723 uIU/mL ( @ 05:48)      HbA1C: 9.2 % ( @ 11:39)  9.2 % ( @ 05:48)  9.1 % ( @ 11:22)    CAPILLARY BLOOD GLUCOSE      POCT Blood Glucose.: 217 mg/dL (02 Oct 2019 07:01)  POCT Blood Glucose.: 228 mg/dL (01 Oct 2019 21:51)  POCT Blood Glucose.: 149 mg/dL (01 Oct 2019 17:04)  POCT Blood Glucose.: 235 mg/dL (01 Oct 2019 11:50)      Insulin Sliding Scale requirements X 24 Hours:    RADIOLOGY & ADDITIONAL TESTS:    A/P: 59y Female with history of DM type II presenting for       1.  DM -     Please continue           units lantus at bedtime  / in the morning and        units lispro with meals and lispro moderate / low dose sliding scale 4 times daily with meals and at bedtime.  Please continue consistent carbohydrate diet.      Goal FSG is   Will continue to monitor   For discharge, pt can continue    Pt can follow up at discharge with [facility] by calling  to make an appointment.   Will discuss case with     and update primary team INTERVAL HPI/OVERNIGHT EVENTS:    Patient seen and examined at the bedside. She is being planned for PCI completion today.    FSG & Insulin received:  Yesterday:  pre-dinner fs, 7 nutritional lispro   units  bedtime fs, 20 lantus   units +  4  units lispro SS  Today:  pre-breakfast fs, 7 nutritional lispro   units+ 4   units lispro SS  pre-lunch fsg:  nutritional lispro   units+   units lispro SS      Pt reports the following symptoms:    CONSTITUTIONAL:  Negative fever or chills, feels well, good appetite  EYES:  Negative  blurry vision or double vision  CARDIOVASCULAR:  Negative for chest pain or palpitations  RESPIRATORY:  Negative for cough, wheezing, or SOB   GASTROINTESTINAL:  Negative for nausea, vomiting, diarrhea, constipation, or abdominal pain  GENITOURINARY:  Negative frequency, urgency or dysuria  NEUROLOGIC:  No headache, confusion, dizziness, lightheadedness    MEDICATIONS  (STANDING):  aspirin  chewable 81 milliGRAM(s) Oral daily  bisacodyl 5 milliGRAM(s) Oral at bedtime  chlorhexidine 2% Cloths 1 Application(s) Topical <User Schedule>  clopidogrel Tablet 75 milliGRAM(s) Oral daily  dextrose 5%. 1000 milliLiter(s) (50 mL/Hr) IV Continuous <Continuous>  dextrose 50% Injectable 12.5 Gram(s) IV Push once  dextrose 50% Injectable 25 Gram(s) IV Push once  dextrose 50% Injectable 25 Gram(s) IV Push once  docusate sodium 100 milliGRAM(s) Oral three times a day  escitalopram 5 milliGRAM(s) Oral daily  heparin  Injectable 5000 Unit(s) SubCutaneous every 8 hours  insulin glargine Injectable (LANTUS) 20 Unit(s) SubCutaneous at bedtime  insulin lispro (HumaLOG) corrective regimen sliding scale   SubCutaneous Before meals and at bedtime  insulin lispro Injectable (HumaLOG) 7 Unit(s) SubCutaneous three times a day before meals  lidocaine   Patch 1 Patch Transdermal every 24 hours  meclizine 25 milliGRAM(s) Oral every 12 hours  metoprolol tartrate 37.5 milliGRAM(s) Oral <User Schedule>  pantoprazole    Tablet 40 milliGRAM(s) Oral before breakfast  polyethylene glycol 3350 17 Gram(s) Oral daily  senna 2 Tablet(s) Oral at bedtime  simvastatin 20 milliGRAM(s) Oral at bedtime  sodium chloride 0.9% lock flush 3 milliLiter(s) IV Push every 8 hours  sodium chloride 0.9%. 1000 milliLiter(s) (10 mL/Hr) IV Continuous <Continuous>    MEDICATIONS  (PRN):  acetaminophen   Tablet .. 650 milliGRAM(s) Oral every 6 hours PRN Temp greater or equal to 38C (100.4F), Mild Pain (1 - 3)  dextrose 40% Gel 15 Gram(s) Oral once PRN Blood Glucose LESS THAN 70 milliGRAM(s)/deciliter  glucagon  Injectable 1 milliGRAM(s) IntraMuscular once PRN Glucose LESS THAN 70 milligrams/deciliter  glucagon  Injectable 1 milliGRAM(s) IntraMuscular Once PRN Glucose LESS THAN 70 milligrams/deciliter  oxyCODONE    5 mG/acetaminophen 325 mG 1 Tablet(s) Oral every 6 hours PRN Moderate Pain (4 - 6)  oxyCODONE    5 mG/acetaminophen 325 mG 2 Tablet(s) Oral every 6 hours PRN Severe Pain (7 - 10)      PHYSICAL EXAM  Vital Signs Last 24 Hrs  T(C): 36.4 (02 Oct 2019 05:01), Max: 38.7 (01 Oct 2019 17:58)  T(F): 97.5 (02 Oct 2019 05:01), Max: 101.6 (01 Oct 2019 17:58)  HR: 106 (02 Oct 2019 05:19) (90 - 110)  BP: 132/75 (02 Oct 2019 05:19) (111/65 - 152/78)  BP(mean): 95 (02 Oct 2019 05:19) (81 - 109)  RR: 18 (02 Oct 2019 05:19) (18 - 18)  SpO2: 96% (02 Oct 2019 05:19) (93% - 98%)    Constitutional: wn/wd in NAD.   Respiratory: lungs CTAB.  Cardiovascular: regular rhythm, normal S1 and S2, no peripheral edema  GI: soft, NT/ND, no masses/HSM appreciated.  Neurology: no tremors, no focal neurological deficits    LABS:                        9.8    9.36  )-----------( 298      ( 02 Oct 2019 06:14 )             32.1     10-02    138  |  100  |  23  ----------------------------<  224<H>  4.9   |  27  |  0.78    Ca    9.2      02 Oct 2019 06:14  Phos  4.3     10-02  Mg     1.7     10-02      PT/INR - ( 02 Oct 2019 06:14 )   PT: 11.7 sec;   INR: 1.03          PTT - ( 02 Oct 2019 06:14 )  PTT:29.7 sec    Thyroid Stimulating Hormone, Serum: 0.723 uIU/mL ( @ 05:48)      HbA1C: 9.2 % ( @ 11:39)  9.2 % ( @ 05:48)  9.1 % ( @ 11:22)    CAPILLARY BLOOD GLUCOSE      POCT Blood Glucose.: 217 mg/dL (02 Oct 2019 07:01)  POCT Blood Glucose.: 228 mg/dL (01 Oct 2019 21:51)  POCT Blood Glucose.: 149 mg/dL (01 Oct 2019 17:04)  POCT Blood Glucose.: 235 mg/dL (01 Oct 2019 11:50)      Insulin Sliding Scale requirements X 24 Hours:    RADIOLOGY & ADDITIONAL TESTS:    A/P: 60 y/o Latvian Speaking F former cigarette smoker x 20yrs (quit 2yrs ago), strong FMHX of CAD and PMH of HTN, HLD, DM, history of Carotid Artery Disease s/p R Sided Carotid Endarterectomy s/p MID-CABG.     1.  DM Type 2 - uncontrolled, complicated. Hba1c 9.2  - Please increase to lantus  units at night.  - Please decrease lispro to  units before each meal.    - Continue moderate dose sliding scale four times daily with meals and at bedtime  - Carb consistent diet  - Pt's fingerstick glucose goal is 120 to 150    Pt can follow up at discharge with [facility] by calling  to make an appointment.   Discussed case with   and updated primary team    REMINDERS FOR INSULIN/DIABETES SUPPLIES at DISCHARGE:  INSULIN:   Long actin/Basal Insulin: Examples: Toujeo, Basaglar, Tresiba, Lantus   Short acting/Bolus Insulin: Humalog, Admelog, Novolog  Please ensure that BOTH short acting and long acting insulin are prescribed in the same preparation (Ex: PEN vs VIAL/SOLUTION)     TESTING SUPPLIES:   All glucometer supplies should be written as generic to avoid issues with insurance. Use the free text option in sunrise prescription writer, and type in glucometer test strips, lancets, etc to order.    If sending patient home on insulin PEN, please send:   •	BD papo insulin pen needles for use up to 4 times daily (total quantity 100)  •	Lancets for use up to 4 times daily (total quantity 100)  •	Glucometer Test strips for use up to 4 times daily (total quantity 100)  •	Alcohol swabs for use up to 4 times daily (total quantity 100)  •	Glucometer (If provided by hospital, still provide scripts for lancets, test strips, and swabs)  If sending patient home on insulin VIAL, please send:   •	Insulin syringes (6mm) - for use up to 4 times daily (total quantity 100)  •	Lancets for use up to 4 times daily (total quantity 100)  •	Generic Glucometer Test strips for use up to 4 times daily (total quantity 100)  •	Alcohol swabs for use up to 4 times daily (total quantity 100)  •	Generic Glucometer (If provided by hospital, still provide scripts for lancets, test strips, and swabs)  •	Do not specify brand for testing supplies (such as contour, freestyle, one touch etc) that way the pharmacy has the freedom to pick and change according to what the insurance dictates.  For patients without insurance:   •	Provide social work with appropriate scripts so they may obtain 1 week of samples  •	Provide with glucometer. Glucometers are located at various nursing stations, the nursing office, education, and endocrine fellows office.  •	Please make appointment with Katarina Champagne NP or Ebnoy Mercedes RN and SHELLY Romero at the [address] [place] [address] endocrinology clinic. They can see patients without insurance, provide appropriate samples, and assist in getting insurance coverage.     PREFERRED PHARMACY:  [facility] (located on 1st floor next to admitting)  P: [phone]  Hours: M – F 8AM – 8PM, Sat 8AM – 4PM, Sun—closed  If not using [facility], please follow up with chosen pharmacy to ensure insulin prescribed is covered. INTERVAL HPI/OVERNIGHT EVENTS:    Patient seen and examined at the bedside. she has a fever spike yesterday with Tmax 101.6 at around 600 PM yesterday. She is being planned for PCI completion today.    FSG & Insulin received:  Yesterday:  pre-dinner fs, 7 nutritional lispro   units  bedtime fs, 20 lantus   units +  4  units lispro SS  Today:  pre-breakfast fs, 7 nutritional lispro   units+ 4   units lispro SS  pre-lunch fsg:  nutritional lispro   units+   units lispro SS      Pt reports the following symptoms:    CONSTITUTIONAL:  Negative fever or chills, feels well, good appetite  EYES:  Negative  blurry vision or double vision  CARDIOVASCULAR:  Negative for chest pain or palpitations  RESPIRATORY:  Negative for cough, wheezing, or SOB   GASTROINTESTINAL:  Negative for nausea, vomiting, diarrhea, constipation, or abdominal pain  GENITOURINARY:  Negative frequency, urgency or dysuria  NEUROLOGIC:  No headache, confusion, dizziness, lightheadedness    MEDICATIONS  (STANDING):  aspirin  chewable 81 milliGRAM(s) Oral daily  bisacodyl 5 milliGRAM(s) Oral at bedtime  chlorhexidine 2% Cloths 1 Application(s) Topical <User Schedule>  clopidogrel Tablet 75 milliGRAM(s) Oral daily  dextrose 5%. 1000 milliLiter(s) (50 mL/Hr) IV Continuous <Continuous>  dextrose 50% Injectable 12.5 Gram(s) IV Push once  dextrose 50% Injectable 25 Gram(s) IV Push once  dextrose 50% Injectable 25 Gram(s) IV Push once  docusate sodium 100 milliGRAM(s) Oral three times a day  escitalopram 5 milliGRAM(s) Oral daily  heparin  Injectable 5000 Unit(s) SubCutaneous every 8 hours  insulin glargine Injectable (LANTUS) 20 Unit(s) SubCutaneous at bedtime  insulin lispro (HumaLOG) corrective regimen sliding scale   SubCutaneous Before meals and at bedtime  insulin lispro Injectable (HumaLOG) 7 Unit(s) SubCutaneous three times a day before meals  lidocaine   Patch 1 Patch Transdermal every 24 hours  meclizine 25 milliGRAM(s) Oral every 12 hours  metoprolol tartrate 37.5 milliGRAM(s) Oral <User Schedule>  pantoprazole    Tablet 40 milliGRAM(s) Oral before breakfast  polyethylene glycol 3350 17 Gram(s) Oral daily  senna 2 Tablet(s) Oral at bedtime  simvastatin 20 milliGRAM(s) Oral at bedtime  sodium chloride 0.9% lock flush 3 milliLiter(s) IV Push every 8 hours  sodium chloride 0.9%. 1000 milliLiter(s) (10 mL/Hr) IV Continuous <Continuous>    MEDICATIONS  (PRN):  acetaminophen   Tablet .. 650 milliGRAM(s) Oral every 6 hours PRN Temp greater or equal to 38C (100.4F), Mild Pain (1 - 3)  dextrose 40% Gel 15 Gram(s) Oral once PRN Blood Glucose LESS THAN 70 milliGRAM(s)/deciliter  glucagon  Injectable 1 milliGRAM(s) IntraMuscular once PRN Glucose LESS THAN 70 milligrams/deciliter  glucagon  Injectable 1 milliGRAM(s) IntraMuscular Once PRN Glucose LESS THAN 70 milligrams/deciliter  oxyCODONE    5 mG/acetaminophen 325 mG 1 Tablet(s) Oral every 6 hours PRN Moderate Pain (4 - 6)  oxyCODONE    5 mG/acetaminophen 325 mG 2 Tablet(s) Oral every 6 hours PRN Severe Pain (7 - 10)      PHYSICAL EXAM  Vital Signs Last 24 Hrs  T(C): 36.4 (02 Oct 2019 05:01), Max: 38.7 (01 Oct 2019 17:58)  T(F): 97.5 (02 Oct 2019 05:01), Max: 101.6 (01 Oct 2019 17:58)  HR: 106 (02 Oct 2019 05:19) (90 - 110)  BP: 132/75 (02 Oct 2019 05:19) (111/65 - 152/78)  BP(mean): 95 (02 Oct 2019 05:19) (81 - 109)  RR: 18 (02 Oct 2019 05:19) (18 - 18)  SpO2: 96% (02 Oct 2019 05:19) (93% - 98%)    Constitutional: wn/wd in NAD.   Respiratory: lungs CTAB.  Cardiovascular: regular rhythm, normal S1 and S2, no peripheral edema  GI: soft, NT/ND, no masses/HSM appreciated.  Neurology: no tremors, no focal neurological deficits    LABS:                        9.8    9.36  )-----------( 298      ( 02 Oct 2019 06:14 )             32.1     10-02    138  |  100  |  23  ----------------------------<  224<H>  4.9   |  27  |  0.78    Ca    9.2      02 Oct 2019 06:14  Phos  4.3     10-02  Mg     1.7     10-02      PT/INR - ( 02 Oct 2019 06:14 )   PT: 11.7 sec;   INR: 1.03          PTT - ( 02 Oct 2019 06:14 )  PTT:29.7 sec    Thyroid Stimulating Hormone, Serum: 0.723 uIU/mL ( @ 05:48)      HbA1C: 9.2 % ( @ 11:39)  9.2 % ( @ 05:48)  9.1 % ( @ 11:22)    CAPILLARY BLOOD GLUCOSE      POCT Blood Glucose.: 217 mg/dL (02 Oct 2019 07:01)  POCT Blood Glucose.: 228 mg/dL (01 Oct 2019 21:51)  POCT Blood Glucose.: 149 mg/dL (01 Oct 2019 17:04)  POCT Blood Glucose.: 235 mg/dL (01 Oct 2019 11:50)      Insulin Sliding Scale requirements X 24 Hours:    RADIOLOGY & ADDITIONAL TESTS:    A/P: 60 y/o Yoruba Speaking F former cigarette smoker x 20yrs (quit 2yrs ago), strong FMHX of CAD and PMH of HTN, HLD, DM, history of Carotid Artery Disease s/p R Sided Carotid Endarterectomy s/p MID-CABG.     1.  DM Type 2 - uncontrolled, complicated. Hba1c 9.2  - Please increase to lantus  units at night.  - Please decrease lispro to  units before each meal.    - Continue moderate dose sliding scale four times daily with meals and at bedtime  - Carb consistent diet  - Pt's fingerstick glucose goal is 120 to 150    Pt can follow up at discharge with [facility] by calling  to make an appointment.   Discussed case with   and updated primary team    REMINDERS FOR INSULIN/DIABETES SUPPLIES at DISCHARGE:  INSULIN:   Long actin/Basal Insulin: Examples: Toujeo, Basaglar, Tresiba, Lantus   Short acting/Bolus Insulin: Humalog, Admelog, Novolog  Please ensure that BOTH short acting and long acting insulin are prescribed in the same preparation (Ex: PEN vs VIAL/SOLUTION)     TESTING SUPPLIES:   All glucometer supplies should be written as generic to avoid issues with insurance. Use the free text option in sunrise prescription writer, and type in glucometer test strips, lancets, etc to order.    If sending patient home on insulin PEN, please send:   •	BD papo insulin pen needles for use up to 4 times daily (total quantity 100)  •	Lancets for use up to 4 times daily (total quantity 100)  •	Glucometer Test strips for use up to 4 times daily (total quantity 100)  •	Alcohol swabs for use up to 4 times daily (total quantity 100)  •	Glucometer (If provided by hospital, still provide scripts for lancets, test strips, and swabs)  If sending patient home on insulin VIAL, please send:   •	Insulin syringes (6mm) - for use up to 4 times daily (total quantity 100)  •	Lancets for use up to 4 times daily (total quantity 100)  •	Generic Glucometer Test strips for use up to 4 times daily (total quantity 100)  •	Alcohol swabs for use up to 4 times daily (total quantity 100)  •	Generic Glucometer (If provided by hospital, still provide scripts for lancets, test strips, and swabs)  •	Do not specify brand for testing supplies (such as contour, freestyle, one touch etc) that way the pharmacy has the freedom to pick and change according to what the insurance dictates.  For patients without insurance:   •	Provide social work with appropriate scripts so they may obtain 1 week of samples  •	Provide with glucometer. Glucometers are located at various nursing stations, the nursing office, education, and endocrine fellows office.  •	Please make appointment with Katarina Champagne NP or Ebony Mercedes RN and SHELLY Romero at the [address] [place] [address] endocrinology clinic. They can see patients without insurance, provide appropriate samples, and assist in getting insurance coverage.     PREFERRED PHARMACY:  [facility] (located on 1st floor next to admitting)  P: [phone]  Hours: M – F 8AM – 8PM, Sat 8AM – 4PM, Sun—closed  If not using [facility], please follow up with chosen pharmacy to ensure insulin prescribed is covered. INTERVAL HPI/OVERNIGHT EVENTS:    Patient seen and examined at the bedside. she has a fever spike yesterday with Tmax 101.6 at around 600 PM yesterday. She did c/o of some chills and rigors at that time. She does c/o of some sore throat and dry cough but denies any other URTI symptoms. She is being planned for PCI completion today later in the evening.    FSG & Insulin received:  Yesterday:  pre-dinner fs, 7 nutritional lispro   units, had barbeque chicken, green beans, soup and chocolate pudding  bedtime fs, 20 lantus   units +  4  units lispro SS , no snacks  Today:  pre-breakfast fs, 7 nutritional lispro   units+ 4   units lispro SS, had some vanilla yogurt  pre-lunch fs,  had some apple sauce as she is going to be NPO    Pt reports the following symptoms:    CONSTITUTIONAL:  feels well, good appetite  EYES:  Negative  blurry vision or double vision  CARDIOVASCULAR:  Negative for chest pain or palpitations  RESPIRATORY:  Negative for  wheezing, or SOB   GASTROINTESTINAL:  Negative for nausea, vomiting, diarrhea, constipation, or abdominal pain  GENITOURINARY:  Negative frequency, urgency or dysuria  NEUROLOGIC:  No headache, confusion, dizziness, lightheadedness    MEDICATIONS  (STANDING):  aspirin  chewable 81 milliGRAM(s) Oral daily  bisacodyl 5 milliGRAM(s) Oral at bedtime  chlorhexidine 2% Cloths 1 Application(s) Topical <User Schedule>  clopidogrel Tablet 75 milliGRAM(s) Oral daily  dextrose 5%. 1000 milliLiter(s) (50 mL/Hr) IV Continuous <Continuous>  dextrose 50% Injectable 12.5 Gram(s) IV Push once  dextrose 50% Injectable 25 Gram(s) IV Push once  dextrose 50% Injectable 25 Gram(s) IV Push once  docusate sodium 100 milliGRAM(s) Oral three times a day  escitalopram 5 milliGRAM(s) Oral daily  heparin  Injectable 5000 Unit(s) SubCutaneous every 8 hours  insulin glargine Injectable (LANTUS) 20 Unit(s) SubCutaneous at bedtime  insulin lispro (HumaLOG) corrective regimen sliding scale   SubCutaneous Before meals and at bedtime  insulin lispro Injectable (HumaLOG) 7 Unit(s) SubCutaneous three times a day before meals  lidocaine   Patch 1 Patch Transdermal every 24 hours  meclizine 25 milliGRAM(s) Oral every 12 hours  metoprolol tartrate 37.5 milliGRAM(s) Oral <User Schedule>  pantoprazole    Tablet 40 milliGRAM(s) Oral before breakfast  polyethylene glycol 3350 17 Gram(s) Oral daily  senna 2 Tablet(s) Oral at bedtime  simvastatin 20 milliGRAM(s) Oral at bedtime  sodium chloride 0.9% lock flush 3 milliLiter(s) IV Push every 8 hours  sodium chloride 0.9%. 1000 milliLiter(s) (10 mL/Hr) IV Continuous <Continuous>    MEDICATIONS  (PRN):  acetaminophen   Tablet .. 650 milliGRAM(s) Oral every 6 hours PRN Temp greater or equal to 38C (100.4F), Mild Pain (1 - 3)  dextrose 40% Gel 15 Gram(s) Oral once PRN Blood Glucose LESS THAN 70 milliGRAM(s)/deciliter  glucagon  Injectable 1 milliGRAM(s) IntraMuscular once PRN Glucose LESS THAN 70 milligrams/deciliter  glucagon  Injectable 1 milliGRAM(s) IntraMuscular Once PRN Glucose LESS THAN 70 milligrams/deciliter  oxyCODONE    5 mG/acetaminophen 325 mG 1 Tablet(s) Oral every 6 hours PRN Moderate Pain (4 - 6)  oxyCODONE    5 mG/acetaminophen 325 mG 2 Tablet(s) Oral every 6 hours PRN Severe Pain (7 - 10)      PHYSICAL EXAM  Vital Signs Last 24 Hrs  T(C): 36.4 (02 Oct 2019 05:01), Max: 38.7 (01 Oct 2019 17:58)  T(F): 97.5 (02 Oct 2019 05:01), Max: 101.6 (01 Oct 2019 17:58)  HR: 106 (02 Oct 2019 05:19) (90 - 110)  BP: 132/75 (02 Oct 2019 05:19) (111/65 - 152/78)  BP(mean): 95 (02 Oct 2019 05:19) (81 - 109)  RR: 18 (02 Oct 2019 05:19) (18 - 18)  SpO2: 96% (02 Oct 2019 05:19) (93% - 98%)    Constitutional: wn/wd in NAD.   Respiratory: lungs CTAB.  Cardiovascular: regular rhythm, normal S1 and S2, no peripheral edema  GI: soft, NT/ND, no masses/HSM appreciated.  Neurology: no tremors, no focal neurological deficits    LABS:                        9.8    9.36  )-----------( 298      ( 02 Oct 2019 06:14 )             32.1     10    138  |  100  |  23  ----------------------------<  224<H>  4.9   |  27  |  0.78    Ca    9.2      02 Oct 2019 06:14  Phos  4.3     10  Mg     1.7     10      PT/INR - ( 02 Oct 2019 06:14 )   PT: 11.7 sec;   INR: 1.03          PTT - ( 02 Oct 2019 06:14 )  PTT:29.7 sec    Thyroid Stimulating Hormone, Serum: 0.723 uIU/mL ( @ 05:48)      HbA1C: 9.2 % ( @ 11:39)  9.2 % ( @ 05:48)  9.1 % ( @ 11:22)    CAPILLARY BLOOD GLUCOSE      POCT Blood Glucose.: 217 mg/dL (02 Oct 2019 07:01)  POCT Blood Glucose.: 228 mg/dL (01 Oct 2019 21:51)  POCT Blood Glucose.: 149 mg/dL (01 Oct 2019 17:04)  POCT Blood Glucose.: 235 mg/dL (01 Oct 2019 11:50)      Insulin Sliding Scale requirements X 24 Hours:    RADIOLOGY & ADDITIONAL TESTS:    A/P: 60 y/o [place] Speaking F former cigarette smoker x 20yrs (quit 2yrs ago), strong FMHX of CAD and PMH of HTN, HLD, DM, history of Carotid Artery Disease s/p R Sided Carotid Endarterectomy s/p MID-CABG.     1.  DM Type 2 - uncontrolled, complicated. Hba1c 9.2  - Please increase to lantus  units at night.  - Please decrease lispro to  units before each meal.    - Continue moderate dose sliding scale four times daily with meals and at bedtime  - Carb consistent diet  - Pt's fingerstick glucose goal is 120 to 150    Pt can follow up at discharge with [facility] by calling  to make an appointment.   Discussed case with   and updated primary team    REMINDERS FOR INSULIN/DIABETES SUPPLIES at DISCHARGE:  INSULIN:   Long actin/Basal Insulin: Examples: Toujeo, Basaglar, Tresiba, Lantus   Short acting/Bolus Insulin: Humalog, Admelog, Novolog  Please ensure that BOTH short acting and long acting insulin are prescribed in the same preparation (Ex: PEN vs VIAL/SOLUTION)     TESTING SUPPLIES:   All glucometer supplies should be written as generic to avoid issues with insurance. Use the free text option in sunrise prescription writer, and type in glucometer test strips, lancets, etc to order.    If sending patient home on insulin PEN, please send:   •	BD papo insulin pen needles for use up to 4 times daily (total quantity 100)  •	Lancets for use up to 4 times daily (total quantity 100)  •	Glucometer Test strips for use up to 4 times daily (total quantity 100)  •	Alcohol swabs for use up to 4 times daily (total quantity 100)  •	Glucometer (If provided by hospital, still provide scripts for lancets, test strips, and swabs)  If sending patient home on insulin VIAL, please send:   •	Insulin syringes (6mm) - for use up to 4 times daily (total quantity 100)  •	Lancets for use up to 4 times daily (total quantity 100)  •	Generic Glucometer Test strips for use up to 4 times daily (total quantity 100)  •	Alcohol swabs for use up to 4 times daily (total quantity 100)  •	Generic Glucometer (If provided by hospital, still provide scripts for lancets, test strips, and swabs)  •	Do not specify brand for testing supplies (such as contour, freestyle, one touch etc) that way the pharmacy has the freedom to pick and change according to what the insurance dictates.  For patients without insurance:   •	Provide social work with appropriate scripts so they may obtain 1 week of samples  •	Provide with glucometer. Glucometers are located at various nursing stations, the nursing office, education, and endocrine fellows office.  •	Please make appointment with Katarina Champagne NP or Ebony Mercedes RN and SHELLY Romero at the [address] [place] [address] endocrinology clinic. They can see patients without insurance, provide appropriate samples, and assist in getting insurance coverage.     PREFERRED PHARMACY:  [facility] (located on 1st floor next to admitting)  P: [phone]  Hours: M – F 8AM – 8PM, Sat 8AM – 4PM, Sun—closed  If not using [facility], please follow up with chosen pharmacy to ensure insulin prescribed is covered. INTERVAL HPI/OVERNIGHT EVENTS:    Patient seen and examined at the bedside. she has a fever spike yesterday with Tmax 101.6 at around 600 PM yesterday. She did c/o of some chills and rigors at that time. She does c/o of some sore throat and dry cough but denies any other URTI symptoms. She is being planned for PCI completion today later in the evening.    FSG & Insulin received:  Yesterday:  pre-dinner fs, 7 nutritional lispro   units, had barbeque chicken, green beans, soup and chocolate pudding  bedtime fs, 20 lantus   units +  4  units lispro SS , no snacks  Today:  pre-breakfast fs, 7 nutritional lispro   units+ 4   units lispro SS, had some vanilla yogurt  pre-lunch fs,  had some apple sauce as she is going to be NPO    Pt reports the following symptoms:    CONSTITUTIONAL:  feels well, good appetite  EYES:  Negative  blurry vision or double vision  CARDIOVASCULAR:  Negative for chest pain or palpitations  RESPIRATORY:  Negative for  wheezing, or SOB   GASTROINTESTINAL:  Negative for nausea, vomiting, diarrhea, constipation, or abdominal pain  GENITOURINARY:  Negative frequency, urgency or dysuria  NEUROLOGIC:  No headache, confusion, dizziness, lightheadedness    MEDICATIONS  (STANDING):  aspirin  chewable 81 milliGRAM(s) Oral daily  bisacodyl 5 milliGRAM(s) Oral at bedtime  chlorhexidine 2% Cloths 1 Application(s) Topical <User Schedule>  clopidogrel Tablet 75 milliGRAM(s) Oral daily  dextrose 5%. 1000 milliLiter(s) (50 mL/Hr) IV Continuous <Continuous>  dextrose 50% Injectable 12.5 Gram(s) IV Push once  dextrose 50% Injectable 25 Gram(s) IV Push once  dextrose 50% Injectable 25 Gram(s) IV Push once  docusate sodium 100 milliGRAM(s) Oral three times a day  escitalopram 5 milliGRAM(s) Oral daily  heparin  Injectable 5000 Unit(s) SubCutaneous every 8 hours  insulin glargine Injectable (LANTUS) 20 Unit(s) SubCutaneous at bedtime  insulin lispro (HumaLOG) corrective regimen sliding scale   SubCutaneous Before meals and at bedtime  insulin lispro Injectable (HumaLOG) 7 Unit(s) SubCutaneous three times a day before meals  lidocaine   Patch 1 Patch Transdermal every 24 hours  meclizine 25 milliGRAM(s) Oral every 12 hours  metoprolol tartrate 37.5 milliGRAM(s) Oral <User Schedule>  pantoprazole    Tablet 40 milliGRAM(s) Oral before breakfast  polyethylene glycol 3350 17 Gram(s) Oral daily  senna 2 Tablet(s) Oral at bedtime  simvastatin 20 milliGRAM(s) Oral at bedtime  sodium chloride 0.9% lock flush 3 milliLiter(s) IV Push every 8 hours  sodium chloride 0.9%. 1000 milliLiter(s) (10 mL/Hr) IV Continuous <Continuous>    MEDICATIONS  (PRN):  acetaminophen   Tablet .. 650 milliGRAM(s) Oral every 6 hours PRN Temp greater or equal to 38C (100.4F), Mild Pain (1 - 3)  dextrose 40% Gel 15 Gram(s) Oral once PRN Blood Glucose LESS THAN 70 milliGRAM(s)/deciliter  glucagon  Injectable 1 milliGRAM(s) IntraMuscular once PRN Glucose LESS THAN 70 milligrams/deciliter  glucagon  Injectable 1 milliGRAM(s) IntraMuscular Once PRN Glucose LESS THAN 70 milligrams/deciliter  oxyCODONE    5 mG/acetaminophen 325 mG 1 Tablet(s) Oral every 6 hours PRN Moderate Pain (4 - 6)  oxyCODONE    5 mG/acetaminophen 325 mG 2 Tablet(s) Oral every 6 hours PRN Severe Pain (7 - 10)      PHYSICAL EXAM  Vital Signs Last 24 Hrs  T(C): 36.4 (02 Oct 2019 05:01), Max: 38.7 (01 Oct 2019 17:58)  T(F): 97.5 (02 Oct 2019 05:01), Max: 101.6 (01 Oct 2019 17:58)  HR: 106 (02 Oct 2019 05:19) (90 - 110)  BP: 132/75 (02 Oct 2019 05:19) (111/65 - 152/78)  BP(mean): 95 (02 Oct 2019 05:19) (81 - 109)  RR: 18 (02 Oct 2019 05:19) (18 - 18)  SpO2: 96% (02 Oct 2019 05:19) (93% - 98%)    Constitutional: wn/wd in NAD.   Respiratory: lungs CTAB.  Cardiovascular: regular rhythm, normal S1 and S2, no peripheral edema  GI: soft, NT/ND, no masses/HSM appreciated.  Neurology: no tremors, no focal neurological deficits    LABS:                        9.8    9.36  )-----------( 298      ( 02 Oct 2019 06:14 )             32.1     10    138  |  100  |  23  ----------------------------<  224<H>  4.9   |  27  |  0.78    Ca    9.2      02 Oct 2019 06:14  Phos  4.3     10  Mg     1.7     10      PT/INR - ( 02 Oct 2019 06:14 )   PT: 11.7 sec;   INR: 1.03          PTT - ( 02 Oct 2019 06:14 )  PTT:29.7 sec    Thyroid Stimulating Hormone, Serum: 0.723 uIU/mL ( @ 05:48)      HbA1C: 9.2 % ( @ 11:39)  9.2 % ( @ 05:48)  9.1 % ( @ 11:22)    CAPILLARY BLOOD GLUCOSE      POCT Blood Glucose.: 217 mg/dL (02 Oct 2019 07:01)  POCT Blood Glucose.: 228 mg/dL (01 Oct 2019 21:51)  POCT Blood Glucose.: 149 mg/dL (01 Oct 2019 17:04)  POCT Blood Glucose.: 235 mg/dL (01 Oct 2019 11:50)      Insulin Sliding Scale requirements X 24 Hours:    RADIOLOGY & ADDITIONAL TESTS:    A/P: 58 y/o [place] Speaking F former cigarette smoker x 20yrs (quit 2yrs ago), strong FMHX of CAD and PMH of HTN, HLD, DM, history of Carotid Artery Disease s/p R Sided Carotid Endarterectomy s/p MID-CABG.     1.  DM Type 2 - uncontrolled, complicated. Hba1c 9.2  - Please continue lantus 20 units units at night.  - Please continue lispro 7  units before each meal.    - Continue moderate dose sliding scale four times daily with meals and at bedtime  - Carb consistent diet  - Pt's fingerstick glucose goal is 120 to 150    Pt can follow up at discharge with [facility] by calling  to make an appointment.   Discussed case with   and updated primary team    REMINDERS FOR INSULIN/DIABETES SUPPLIES at DISCHARGE:  INSULIN:   Long actin/Basal Insulin: Examples: Toujeo, Basaglar, Tresiba, Lantus   Short acting/Bolus Insulin: Humalog, Admelog, Novolog  Please ensure that BOTH short acting and long acting insulin are prescribed in the same preparation (Ex: PEN vs VIAL/SOLUTION)     TESTING SUPPLIES:   All glucometer supplies should be written as generic to avoid issues with insurance. Use the free text option in sunrise prescription writer, and type in glucometer test strips, lancets, etc to order.    If sending patient home on insulin PEN, please send:   •	BD papo insulin pen needles for use up to 4 times daily (total quantity 100)  •	Lancets for use up to 4 times daily (total quantity 100)  •	Glucometer Test strips for use up to 4 times daily (total quantity 100)  •	Alcohol swabs for use up to 4 times daily (total quantity 100)  •	Glucometer (If provided by hospital, still provide scripts for lancets, test strips, and swabs)  If sending patient home on insulin VIAL, please send:   •	Insulin syringes (6mm) - for use up to 4 times daily (total quantity 100)  •	Lancets for use up to 4 times daily (total quantity 100)  •	Generic Glucometer Test strips for use up to 4 times daily (total quantity 100)  •	Alcohol swabs for use up to 4 times daily (total quantity 100)  •	Generic Glucometer (If provided by hospital, still provide scripts for lancets, test strips, and swabs)  •	Do not specify brand for testing supplies (such as contour, freestyle, one touch etc) that way the pharmacy has the freedom to pick and change according to what the insurance dictates.  For patients without insurance:   •	Provide social work with appropriate scripts so they may obtain 1 week of samples  •	Provide with glucometer. Glucometers are located at various nursing stations, the nursing office, education, and endocrine fellows office.  •	Please make appointment with Katarina Champagne NP or Ebony Mercedes RN and SHELLY Romero at the [address] [place] [address] endocrinology clinic. They can see patients without insurance, provide appropriate samples, and assist in getting insurance coverage.     PREFERRED PHARMACY:  [facility] (located on 1st floor next to admitting)  P: [phone]  Hours: M – F 8AM – 8PM, Sat 8AM – 4PM, Sun—closed  If not using [facility], please follow up with chosen pharmacy to ensure insulin prescribed is covered.

## 2019-10-02 NOTE — CHART NOTE - NSCHARTNOTEFT_GEN_A_CORE
Admitting Diagnosis:   Patient is a 59y old  Female who presents with a chief complaint of SOB with abnormal nuclear stress test (02 Oct 2019 09:21)      PAST MEDICAL & SURGICAL HISTORY:  Diabetes mellitus  Hyperlipemia  Hypertension  History of cardiac cath: 2013 ([facility])  H/O carotid endarterectomy: R side - Sptember 2018      Current Nutrition Order:   Diet, NPO after Midnight:      NPO Start Date: 01-Oct-2019,   NPO Start Time: 23:59 (10-01-19 @ 18:16)    CCD no Snacks + DASH/TLC     PO Intake: Good (%) [   ]  Fair (50-75%) [   ] Poor (<25%) [   ]- NPO     GI Issues: no noted n/v/d/c    Pain: no pain noted     Skin Integrity: incision     Labs:   10-02    138  |  100  |  23  ----------------------------<  224<H>  4.9   |  27  |  0.78    Ca    9.2      02 Oct 2019 06:14  Phos  4.3     10-02  Mg     1.7     10-02      CAPILLARY BLOOD GLUCOSE      POCT Blood Glucose.: 169 mg/dL (02 Oct 2019 11:31)  POCT Blood Glucose.: 217 mg/dL (02 Oct 2019 07:01)  POCT Blood Glucose.: 228 mg/dL (01 Oct 2019 21:51)  POCT Blood Glucose.: 149 mg/dL (01 Oct 2019 17:04)      Medications:  MEDICATIONS  (STANDING):  aspirin  chewable 81 milliGRAM(s) Oral daily  bisacodyl 5 milliGRAM(s) Oral at bedtime  chlorhexidine 2% Cloths 1 Application(s) Topical <User Schedule>  clopidogrel Tablet 75 milliGRAM(s) Oral daily  dextrose 5%. 1000 milliLiter(s) (50 mL/Hr) IV Continuous <Continuous>  dextrose 50% Injectable 12.5 Gram(s) IV Push once  dextrose 50% Injectable 25 Gram(s) IV Push once  dextrose 50% Injectable 25 Gram(s) IV Push once  docusate sodium 100 milliGRAM(s) Oral three times a day  escitalopram 5 milliGRAM(s) Oral daily  heparin  Injectable 5000 Unit(s) SubCutaneous every 8 hours  insulin glargine Injectable (LANTUS) 20 Unit(s) SubCutaneous at bedtime  insulin lispro (HumaLOG) corrective regimen sliding scale   SubCutaneous Before meals and at bedtime  insulin lispro Injectable (HumaLOG) 7 Unit(s) SubCutaneous three times a day before meals  lidocaine   Patch 1 Patch Transdermal every 24 hours  meclizine 25 milliGRAM(s) Oral every 12 hours  metoprolol tartrate 37.5 milliGRAM(s) Oral <User Schedule>  pantoprazole    Tablet 40 milliGRAM(s) Oral before breakfast  polyethylene glycol 3350 17 Gram(s) Oral daily  senna 2 Tablet(s) Oral at bedtime  simvastatin 20 milliGRAM(s) Oral at bedtime  sodium chloride 0.9% lock flush 3 milliLiter(s) IV Push every 8 hours  sodium chloride 0.9%. 1000 milliLiter(s) (10 mL/Hr) IV Continuous <Continuous>  sodium chloride 0.9%. 1000 milliLiter(s) (75 mL/Hr) IV Continuous <Continuous>    MEDICATIONS  (PRN):  acetaminophen   Tablet .. 650 milliGRAM(s) Oral every 6 hours PRN Temp greater or equal to 38C (100.4F), Mild Pain (1 - 3)  dextrose 40% Gel 15 Gram(s) Oral once PRN Blood Glucose LESS THAN 70 milliGRAM(s)/deciliter  glucagon  Injectable 1 milliGRAM(s) IntraMuscular once PRN Glucose LESS THAN 70 milligrams/deciliter  glucagon  Injectable 1 milliGRAM(s) IntraMuscular Once PRN Glucose LESS THAN 70 milligrams/deciliter  oxyCODONE    5 mG/acetaminophen 325 mG 1 Tablet(s) Oral every 6 hours PRN Moderate Pain (4 - 6)  oxyCODONE    5 mG/acetaminophen 325 mG 2 Tablet(s) Oral every 6 hours PRN Severe Pain (7 - 10)      Weight: 76.2kg (9/25)   76.2kg (10/2)     Weight Change: stable since admission     Nutrition Focused Physical Exam: Completed [   ]  Not Pertinent [ x  ]    Estimated energy needs:   Ideal body weight used for calculations as pt >120% of IBW.   ABW 76.2kg, IBW 56kg, 136% IBW, ht 65", BMI 28   Nutrient needs based on [facility] standards of care for maintenance in adults, adjusted for post-op needs, fluid per team  1400-1680kcal (25-30kcal/kg)   56-67g pro (1-1.2g/kg pro)     Subjective:   59F former 5-6 cigarette x 20yrs (quit 2yrs ago) with strong FMHx of CAD and PMH of HTN, HLD, DM (A1c 9.2), history of diagnostic cardiac catheterization 2013, Carotid Artery Disease s/p R Sided Carotid Endarterectomy, who presents w/ [facility]. Work up including NST in 8/2019 showed a medium size completely reversible perfusion defect. Cardiac Cath revealed LAD- 100% CT surgery was consulted, pt underwent pre-surgical testing and was deemed a surgical candidate. On 9/27 she underwent robotic MIDCAB. Operation was uncomplicated. She was briefly placed on Cleviprex for htn. She was extubated on POD 0. On POD 1 Endocrinology was consulted for Hga1c 9.2 and fsg in 200s. POD 3 she was deemed stable for transfer to floor. Pt now noted to be tolerating diet without issue. CXR 9/30 showing possible effusion. NPO for PCI today, plan for DC tomorrow. Plan remains for home when stable. Will follow per protocol.     Previous Nutrition Diagnosis: Increased nutrient needs r/t post-op needs AEB hypermetabolic state     Active [  x ]  Resolved [   ]    If resolved, new PES:     Goal: meet >75% EER     Recommendations:  1. Reinforce ed   2. Manage pain prn   3. Trend wts   4. Encourage intake through day    Education: n/a NPO     Risk Level: High [ x  ] Moderate [   ] Low [   ]

## 2019-10-02 NOTE — CHART NOTE - NSCHARTNOTEFT_GEN_A_CORE
Exam:  US Chest    Procedure Date:    History: 59y Female whose CXR today shows a possible left sided pleural effusion.  Pt is POD #5 from MIDCAB      Findings:                    Evaluation of the left side of the thoracic cavity demonstrates                   small pleural effusion                  Lung is freely movable with in thoracic cavity    Impression: Small pleural effusion ~300 without good window for drainage. Will continue IV diuresis and repeat tomorrow

## 2019-10-02 NOTE — PROGRESS NOTE ADULT - ASSESSMENT
A/P: 59 year old female, former smoker with PMHx of HTN, HLD, DM, Carotid Artery Disease s/p R CEA presented to [facility] for elective cardiac catheterization revealing 2V CAD and CT surgery was consulted for surgical evaluation. Preoperative workup was completed and patient underwent MIDCAB procedure with Dr. Arenas on 9/27/19. Procedure was uncomplicated and she arrived to CT ICU post operatively intubated and stable. She was quickly extubated POD#0, required cleviprex post operatively for HTN and weaned off all gtts by POD#1. She continued to remain stable and transferred to  POD#2. Endocrine was consulted for hgba1c of 9.2 and insulin regimen was adjusted. No acute events overnight. Plan for patient to undergo PCI with Dr. Palomo today.     Neurovascular: Stable. Pain well controlled with current regimen.  - Continue tylenol and percocet PRN     Cardiovascular: Hemodynamically stable. HR controlled.  - CAD s/p MIDCAB. Continue asa 81mg, plavix 75mg, simvastatin 20mg qhs and metoprolol 37.5mg Q6 (increased this afternoon). Continue to titrate as tolerated   - Plan for PCI with Dr. Palomo today to complete hyrbid procedure     Respiratory: 02 Sat = 96% on RA.  - CXR small left effusion, bedside ultrasound around 300cc without good window for drainage. Patient saturating well on room air without subjective SOB. IV lasix 20mg given, continue diuresis.   - Encourage C+DB and Use of IS 10x / hr while awake.  - Follow up CXR in AM     GI: Stable.  -NPO after MN.  -PPX.  -PO Diet.    Renal / :  -Monitor renal function.  -Monitor I/O's.    Endocrine:    -A1c.  -TSH.    Hematologic:  -CBC.  -Coagulation Panel.    ID:  -Tempature.  -CBC.  -Observe for SIRS/Sepsis Syndrome.    Prophylaxis:  -DVT prophylaxis with 5000 SubQ Heparin q8h.  -SCD's    Disposition:  -ICU for frequent monitoring.: A/P: 59 year old female, former smoker with PMHx of HTN, HLD, DM, Carotid Artery Disease s/p R CEA presented to [facility] for elective cardiac catheterization revealing 2V CAD and CT surgery was consulted for surgical evaluation. Preoperative workup was completed and patient underwent MIDCAB procedure with Dr. Arenas on 9/27/19. Procedure was uncomplicated and she arrived to CT ICU post operatively intubated and stable. She was quickly extubated POD#0, required cleviprex post operatively for HTN and weaned off all gtts by POD#1. She continued to remain stable and transferred to  POD#2. Endocrine was consulted for hgba1c of 9.2 and insulin regimen was adjusted. No acute events overnight. Plan for patient to undergo PCI with Dr. Palomo today.     Neurovascular: Stable. Pain well controlled with current regimen.  - Continue tylenol and percocet PRN   - Continue lexapro 5mg for depression     Cardiovascular: Hemodynamically stable. HR controlled.  - CAD s/p MIDCAB. Continue asa 81mg, plavix 75mg, simvastatin 20mg qhs and metoprolol 37.5mg Q6 (increased this afternoon). Continue to titrate as tolerated   - Plan for PCI with Dr. Palomo today to complete hyrbid procedure     Respiratory: 02 Sat = 96% on RA.  - CXR small left effusion, bedside ultrasound around 300cc without good window for drainage. Patient saturating well on room air without subjective SOB. IV lasix 20mg given, continue diuresis.   - Encourage C+DB and Use of IS 10x / hr while awake.  - Follow up CXR in AM     GI: Stable.  - NPO for cardiac cath today, can resume PO diet post cath  - Continue protonix 40mg for GI ppx   - Continue bowel regimen     Renal / : Cr 0.78, no active issues   - Given IV lasix 20mg this afternoon, continue diuresis with lasix 40mg daily   - Monitor renal function.  - Monitor I/O's.     Endocrine: hgba1c 9.2,  TSH wnl    - Endocrine following, continue lantus 20u qhs and humalog 7u TID   - Continue insulin sliding scale   - Monitor fingersticks     Prophylaxis:  - DVT prophylaxis with 5000 SubQ Heparin q8h.  - SCD's    Disposition: Home when medically ready

## 2019-10-02 NOTE — PROGRESS NOTE ADULT - SUBJECTIVE AND OBJECTIVE BOX
Patient discussed on morning rounds with Dr. Salcido      Operation / Date: 9/27/19 MIDCA     SUBJECTIVE ASSESSMENT:  Patient seen this morning at bedside (pacific  #[id number]), doing well but complaining of incisional pain and subjective chills since last night. Patient denies any productive cough, dysuria, shortness of breath or chest pain.     Vital Signs Last 24 Hrs  T(C): 37 (02 Oct 2019 09:59), Max: 38.7 (01 Oct 2019 17:58)  T(F): 98.6 (02 Oct 2019 09:59), Max: 101.6 (01 Oct 2019 17:58)  HR: 98 (02 Oct 2019 14:09) (90 - 110)  BP: 138/73 (02 Oct 2019 14:09) (111/65 - 152/78)  BP(mean): 100 (02 Oct 2019 14:09) (81 - 109)  RR: 18 (02 Oct 2019 14:09) (18 - 18)  SpO2: 94% (02 Oct 2019 14:09) (93% - 96%)  I&O's Detail    01 Oct 2019 07:01  -  02 Oct 2019 07:00  --------------------------------------------------------  IN:    Oral Fluid: 600 mL  Total IN: 600 mL    OUT:    Voided: 700 mL  Total OUT: 700 mL    Total NET: -100 mL      02 Oct 2019 07:01  -  02 Oct 2019 14:41  --------------------------------------------------------  IN:  Total IN: 0 mL    OUT:    Voided: 200 mL  Total OUT: 200 mL    Total NET: -200 mL    CHEST TUBE:  No  JOHNSON DRAIN:  No  EPICARDIAL WIRES: No  TIE DOWNS: Yes.  MESSINA: No.    PHYSICAL EXAM:    General: Patient sitting comfortably in a chair, no acute distress     Neurological: Alert and oriented. No focal neurological deficits     Cardiovascular: S1S2, RRR, no murmurs appreciated on exam     Respiratory: Clear to ausculation bilaterally     Gastrointestinal: Abdomen soft, non tender, non distended     Extremities: Warm and well perfused. No edema or calf tenderness     Vascular: Peripheral pulses 2+ bilaterally     Incision Sites: L inframammary thoracotomy incision C/D/I, no drainage or surrounding erythema   chest tube site C/D/I     LABS:                        9.8    9.36  )-----------( 298      ( 02 Oct 2019 06:14 )             32.1       COUMADIN:  No    PT/INR - ( 02 Oct 2019 06:14 )   PT: 11.7 sec;   INR: 1.03          PTT - ( 02 Oct 2019 06:14 )  PTT:29.7 sec    10-02    138  |  100  |  23  ----------------------------<  224<H>  4.9   |  27  |  0.78    Ca    9.2      02 Oct 2019 06:14  Phos  4.3     10-02  Mg     1.7     10-02      MEDICATIONS  (STANDING):  aspirin  chewable 81 milliGRAM(s) Oral daily  bisacodyl 5 milliGRAM(s) Oral at bedtime  chlorhexidine 2% Cloths 1 Application(s) Topical <User Schedule>  clopidogrel Tablet 75 milliGRAM(s) Oral daily  docusate sodium 100 milliGRAM(s) Oral three times a day  escitalopram 5 milliGRAM(s) Oral daily  heparin  Injectable 5000 Unit(s) SubCutaneous every 8 hours  insulin glargine Injectable (LANTUS) 20 Unit(s) SubCutaneous at bedtime  insulin lispro (HumaLOG) corrective regimen sliding scale   SubCutaneous Before meals and at bedtime  insulin lispro Injectable (HumaLOG) 7 Unit(s) SubCutaneous three times a day before meals  lidocaine   Patch 1 Patch Transdermal every 24 hours  meclizine 25 milliGRAM(s) Oral every 12 hours  metoprolol tartrate 37.5 milliGRAM(s) Oral <User Schedule>  pantoprazole    Tablet 40 milliGRAM(s) Oral before breakfast  polyethylene glycol 3350 17 Gram(s) Oral daily  senna 2 Tablet(s) Oral at bedtime  simvastatin 20 milliGRAM(s) Oral at bedtime    MEDICATIONS  (PRN):  acetaminophen   Tablet .. 650 milliGRAM(s) Oral every 6 hours PRN Temp greater or equal to 38C (100.4F), Mild Pain (1 - 3)  oxyCODONE    5 mG/acetaminophen 325 mG 1 Tablet(s) Oral every 6 hours PRN Moderate Pain (4 - 6)  oxyCODONE    5 mG/acetaminophen 325 mG 2 Tablet(s) Oral every 6 hours PRN Severe Pain (7 - 10)        RADIOLOGY & ADDITIONAL TESTS:

## 2019-10-02 NOTE — PROGRESS NOTE ADULT - SUBJECTIVE AND OBJECTIVE BOX
Interventional Cardiology PA Precath Note: Skeleton      HPI:60 y/o Malay Speaking F former 5-6 cigarette x 20yrs (quit 2yrs ago) with strong FMHX of CAD and PMH of HTN, HLD, DM, history of diagnostic cardiac catheterization @ [facility] in 2013, Carotid Artery Disease s/p R Sided Carotid Endarterectomy, who presents w/ STARKEY after 1/2 flight of stairs and does not occur at rest (CCS Angina Equivalent Class 3 Sx). Patient additionally admits to occasional dizziness. Patient denied CP, diaphoresis, LE edema, orthopnea, palpitations, PND, fatigue, N/V or syncope. Patient was referred to Dr. Palomo for further cardiac evaluation. Per MD note, echo showed normal LVEF with no significant valvular abnormality. N ST in 8/2019 showed a medium size completely reversible perfusion defect of moderate intensity involving the mid-apical entire septum (anterior and posterior) and the inferior myocardial walls suggestive of inducible myocardial ischemia in the posterior and LAD coronary circulation distributions. Elective Cath 9/25/19:  revealed LAD- 100%  proximal with R-L and L-L collaterals, pD1-  90% stenosis, LCX- mild disease, pOM1- 80% stenosis, pOM2- 80% stenosis, RCA- Dominant with mild luminal irregularities, LM normal. LVEF normal 60%, LVEDP normal, no AS/MR.  Pt planned for  hybrid procedure and underwent MIDCAB 10/1/19 and now presents for recommended LHC today with Dr. Tavares.      Anginal class 1, 2, 3, 4      PSH:    ALL: NKDA, NKFA. Denies shellfish/Contrast dye allergy.  SocHX: Denies EtoH/TOB/IVDU  FHx:   MEDS:   acetaminophen   Tablet .. 650 milliGRAM(s) Oral every 6 hours PRN  aspirin  chewable 81 milliGRAM(s) Oral daily  bisacodyl 5 milliGRAM(s) Oral at bedtime  chlorhexidine 2% Cloths 1 Application(s) Topical <User Schedule>  clopidogrel Tablet 75 milliGRAM(s) Oral daily  dextrose 40% Gel 15 Gram(s) Oral once PRN  dextrose 5%. 1000 milliLiter(s) IV Continuous <Continuous>  dextrose 50% Injectable 12.5 Gram(s) IV Push once  dextrose 50% Injectable 25 Gram(s) IV Push once  dextrose 50% Injectable 25 Gram(s) IV Push once  docusate sodium 100 milliGRAM(s) Oral three times a day  escitalopram 5 milliGRAM(s) Oral daily  glucagon  Injectable 1 milliGRAM(s) IntraMuscular once PRN  glucagon  Injectable 1 milliGRAM(s) IntraMuscular Once PRN  heparin  Injectable 5000 Unit(s) SubCutaneous every 8 hours  insulin glargine Injectable (LANTUS) 20 Unit(s) SubCutaneous at bedtime  insulin lispro (HumaLOG) corrective regimen sliding scale   SubCutaneous Before meals and at bedtime  insulin lispro Injectable (HumaLOG) 7 Unit(s) SubCutaneous three times a day before meals  lidocaine   Patch 1 Patch Transdermal every 24 hours  meclizine 25 milliGRAM(s) Oral every 12 hours  metoprolol tartrate 37.5 milliGRAM(s) Oral <User Schedule>  oxyCODONE    5 mG/acetaminophen 325 mG 1 Tablet(s) Oral every 6 hours PRN  oxyCODONE    5 mG/acetaminophen 325 mG 2 Tablet(s) Oral every 6 hours PRN  pantoprazole    Tablet 40 milliGRAM(s) Oral before breakfast  polyethylene glycol 3350 17 Gram(s) Oral daily  senna 2 Tablet(s) Oral at bedtime  simvastatin 20 milliGRAM(s) Oral at bedtime  sodium chloride 0.9% lock flush 3 milliLiter(s) IV Push every 8 hours  sodium chloride 0.9%. 1000 milliLiter(s) IV Continuous <Continuous>    T(C): 36.4 (10-02-19 @ 05:01), Max: 38.7 (10-01-19 @ 17:58)  HR: 106 (10-02-19 @ 05:19) (90 - 110)  BP: 132/75 (10-02-19 @ 05:19) (111/65 - 152/78)  RR: 18 (10-02-19 @ 05:19) (18 - 18)  SpO2: 96% (10-02-19 @ 05:19) (93% - 98%)  Wt(kg): --      HEENT: NCAT, EOMI, PERRLA  NECK: No JVD, No carotid bruits B/L, +2 Carotid pulses B/L  PULM:  CTA B/L No W/R/R  CARD: RRR, +S1, +S2, No M/R/G  ABD: ND, +BS, NT, no masses  EXT: Warm, pedal edema yes/no, pitting/non-pitting  RECTAL: Guaiac negative/positive   NEURO: A & O x 3, no focal neurologic deficits  PULSES:	B	    R	      FEM         	                                            DP                          PT  Right		                                                  Yes/No     Bruit	    Left		                                                  Yes/No     Bruit                                9.8    9.36  )-----------( 298      ( 02 Oct 2019 06:14 )             32.1     10-02    138  |  100  |  23  ----------------------------<  224<H>  4.9   |  27  |  0.78    Ca    9.2      02 Oct 2019 06:14  Phos  4.3     10-02  Mg     1.7     10-02            EKG:					  ASA _____				Mallampati class: _________	            Anginal Class: _________  A/P:        Sedation Plan:   ? None   ? Moderate    ?  Deep    ?  General Anesthesia   Patient Is Suitable Candidate For Sedation?     ? Yes   ? No   ? Not Applicable     Risks & benefits of procedure and sedation and risks and benefits for the alternative therapy have been explained to the patient in layman’s terms including but not limited to: allergic reaction, bleeding, infection, arrhythmia, respiratory compromise, renal and vascular compromise, limb damage, MI, CVA, emergent CABG/Vascular Surgery and death. Informed consent obtained and in chart. Interventional Cardiology PA Precath Note:     HPI:60 y/o Chinese/English Speaking F former 5-6 cigarette x 20yrs (quit 2yrs ago) with strong FMHX of CAD and PMH of HTN, HLD, DM, history of diagnostic cardiac catheterization @ [facility] in 2013, Carotid Artery Disease s/p R Sided Carotid Endarterectomy, who presents w/ STARKEY after 1/2 flight of stairs and does not occur at rest (CCS Angina Equivalent Class 3 Sx). Patient additionally admits to occasional dizziness. Patient denied CP, diaphoresis, LE edema, orthopnea, palpitations, PND, fatigue, N/V or syncope. Patient was referred to Dr. Palomo for further cardiac evaluation. Per MD note, echo showed normal LVEF with no significant valvular abnormality. N ST in 8/2019 showed a medium size completely reversible perfusion defect of moderate intensity involving the mid-apical entire septum (anterior and posterior) and the inferior myocardial walls suggestive of inducible myocardial ischemia in the posterior and LAD coronary circulation distributions. Pt underwent Elective Cath 9/25/19:  revealed LAD- 100%  proximal with R-L and L-L collaterals, pD1-  90% stenosis, LCX- mild disease, pOM1- 80% stenosis, pOM2- 80% stenosis, RCA- Dominant with mild luminal irregularities, LM normal. LVEF normal 60%, LVEDP normal, no AS/MR.  Pt with planned hybrid procedure, underwent MIDCAB 10/1/19 and now presents for recommended staged Cath procedure today with Dr. Tavares.      Pt seen, examined at bedside, NAD, HD stable, endorsed 8/10, CP radiating to her back, reports pain only occurs with inspiration. Also reports feeling cold, however, noted Afebrile with normal WBC.  Pt  currently denies SOB, palpitations, diaphoresis, N/V, syncope or any other complaints at this time.  Pt advised to use her bedside spirometry as CP appears atypical,  symptoms are likely post-op related.   VSS:  BP: 112/65, HR 95, RR 18, Temp 98.6, O2Sat 98%. Labs significant for WBC 9.36, H/H ( 9.8/32.1 improving from 9.6/31.4), Na 138, K 4.9, BUN/Cr (2./0.78), Glu 224, INR 1.03.  CXR 10/2:  new right perihilar atelectasis, otherwise stable lung pathology and heart size.  In light of the pt's known CAD, planned Hybrid procedure and recent MIBCABG, pt now presents for recommended staged procedure with Dr. Palomo.      Anginal class: 4    PSH:  MIDCABG @ [facility] 10/1/19    ALL: NKDA, NKFA. Denies shellfish/Contrast dye allergy.  SocHX: Denies EtoH/TOB/IVDU    MEDS:   acetaminophen   Tablet .. 650 milliGRAM(s) Oral every 6 hours PRN  aspirin  chewable 81 milliGRAM(s) Oral daily  bisacodyl 5 milliGRAM(s) Oral at bedtime  chlorhexidine 2% Cloths 1 Application(s) Topical <User Schedule>  clopidogrel Tablet 75 milliGRAM(s) Oral daily  dextrose 40% Gel 15 Gram(s) Oral once PRN  dextrose 5%. 1000 milliLiter(s) IV Continuous <Continuous>  dextrose 50% Injectable 12.5 Gram(s) IV Push once  dextrose 50% Injectable 25 Gram(s) IV Push once  dextrose 50% Injectable 25 Gram(s) IV Push once  docusate sodium 100 milliGRAM(s) Oral three times a day  escitalopram 5 milliGRAM(s) Oral daily  glucagon  Injectable 1 milliGRAM(s) IntraMuscular once PRN  glucagon  Injectable 1 milliGRAM(s) IntraMuscular Once PRN  heparin  Injectable 5000 Unit(s) SubCutaneous every 8 hours  insulin glargine Injectable (LANTUS) 20 Unit(s) SubCutaneous at bedtime  insulin lispro (HumaLOG) corrective regimen sliding scale   SubCutaneous Before meals and at bedtime  insulin lispro Injectable (HumaLOG) 7 Unit(s) SubCutaneous three times a day before meals  lidocaine   Patch 1 Patch Transdermal every 24 hours  meclizine 25 milliGRAM(s) Oral every 12 hours  metoprolol tartrate 37.5 milliGRAM(s) Oral <User Schedule>  oxyCODONE    5 mG/acetaminophen 325 mG 1 Tablet(s) Oral every 6 hours PRN  oxyCODONE    5 mG/acetaminophen 325 mG 2 Tablet(s) Oral every 6 hours PRN  pantoprazole    Tablet 40 milliGRAM(s) Oral before breakfast  polyethylene glycol 3350 17 Gram(s) Oral daily  senna 2 Tablet(s) Oral at bedtime  simvastatin 20 milliGRAM(s) Oral at bedtime  sodium chloride 0.9% lock flush 3 milliLiter(s) IV Push every 8 hours  sodium chloride 0.9%. 1000 milliLiter(s) IV Continuous <Continuous>    T(C): 36.4 (10-02-19 @ 05:01), Max: 38.7 (10-01-19 @ 17:58)  HR: 106 (10-02-19 @ 05:19) (90 - 110)  BP: 132/75 (10-02-19 @ 05:19) (111/65 - 152/78)  RR: 18 (10-02-19 @ 05:19) (18 - 18)  SpO2: 96% (10-02-19 @ 05:19) (93% - 98%)  Wt(kg): --    GEN: AOX3, NAD  NECK: No JVD, No carotid bruits B/L, +2 Carotid pulses B/L  PULM: CTA B/L No W/R/R  CARD: RRR, +S1, +S2, No M/R/G, Axillary sutures intact, site w/o bleeding or signs of infection.    ABD: ND, +BS, NT, no masses  EXT: Warm, no LE edema B/L, DP/PT 2+ B/L.  NEURO:  no focal neurologic deficits  PULSES:	B	    R	      FEM         	                                            DP                          PT  Right		    2+           2+              No     Bruit	                    2+                          2+  Left		    2+           2+              No     Bruit                           2+                          2+                          9.8    9.36  )-----------( 298      ( 02 Oct 2019 06:14 )             32.1     10-02    138  |  100  |  23  ----------------------------<  224<H>  4.9   |  27  |  0.78    Ca    9.2      02 Oct 2019 06:14  Phos  4.3     10-02  Mg     1.7     10-02    				  ASA: III				Mallampati class: II            Anginal Class: 4    A/P:  60 y/o Chinese/English Speaking, former smoker, strong FMHX of CAD, PMH  HTN, HLD, DM, Dx Cath  @ [facility] in 2013, s/p Carotid Artery Disease s/p R Sided Carotid Endarterectomy, underwent elective cardiac cath 9/30/19 revealing 3V CAD, (LAD- 100%  proximal with R-L and L-L collaterals, pD1-  90% stenosis, LCX- mild disease, pOM1- 80% stenosis, pOM2- 80% stenosis), underwent recent MIDCABG 10/1/19 and now presents for recommended stage PCI with Dr. Tavares.     Sedation Plan: Moderate  Patient Is Suitable Candidate For Sedation:  Yes     Risks & benefits of procedure and sedation and risks and benefits for the alternative therapy have been explained to the patient in layman’s terms including but not limited to: allergic reaction, bleeding, infection, arrhythmia, respiratory compromise, renal and vascular compromise, limb damage, MI, CVA, emergent CABG/Vascular Surgery and death. Informed consent obtained and in chart.      Of note: Pt already on ASA 81mg and Plavix 75mg daily.  EF normal, NS @ 75x 4h given for hydration.

## 2019-10-03 ENCOUNTER — TRANSCRIPTION ENCOUNTER (OUTPATIENT)
Age: 59
End: 2019-10-03

## 2019-10-03 VITALS — TEMPERATURE: 97 F

## 2019-10-03 VITALS — BODY MASS INDEX: 27.92 KG/M2 | HEIGHT: 64.96 IN | WEIGHT: 167.55 LBS

## 2019-10-03 PROBLEM — Z09 POSTOP CHECK: Status: ACTIVE | Noted: 2019-10-03

## 2019-10-03 PROBLEM — Z87.891 FORMER SMOKER: Status: ACTIVE | Noted: 2019-10-03

## 2019-10-03 PROBLEM — Z86.79 HISTORY OF HYPERTENSION: Status: RESOLVED | Noted: 2019-10-03 | Resolved: 2019-10-03

## 2019-10-03 PROBLEM — Z95.1 S/P CABG X 1: Status: ACTIVE | Noted: 2019-10-03

## 2019-10-03 PROBLEM — Z82.49 FAMILY HISTORY OF CORONARY ARTERY DISEASE: Status: ACTIVE | Noted: 2019-10-03

## 2019-10-03 PROBLEM — Z86.39 HISTORY OF DIABETES MELLITUS: Status: RESOLVED | Noted: 2019-10-03 | Resolved: 2019-10-03

## 2019-10-03 PROBLEM — I65.21 STENOSIS OF RIGHT CAROTID ARTERY: Status: RESOLVED | Noted: 2019-10-03 | Resolved: 2019-10-03

## 2019-10-03 PROBLEM — Z86.39 HISTORY OF HYPERLIPIDEMIA: Status: RESOLVED | Noted: 2019-10-03 | Resolved: 2019-10-03

## 2019-10-03 PROBLEM — I25.10 CAD (CORONARY ARTERY DISEASE): Status: ACTIVE | Noted: 2019-10-03

## 2019-10-03 LAB
ANION GAP SERPL CALC-SCNC: 9 MMOL/L — SIGNIFICANT CHANGE UP (ref 5–17)
BUN SERPL-MCNC: 18 MG/DL — SIGNIFICANT CHANGE UP (ref 7–23)
CALCIUM SERPL-MCNC: 9 MG/DL — SIGNIFICANT CHANGE UP (ref 8.4–10.5)
CHLORIDE SERPL-SCNC: 100 MMOL/L — SIGNIFICANT CHANGE UP (ref 96–108)
CO2 SERPL-SCNC: 26 MMOL/L — SIGNIFICANT CHANGE UP (ref 22–31)
CREAT SERPL-MCNC: 0.76 MG/DL — SIGNIFICANT CHANGE UP (ref 0.5–1.3)
GLUCOSE BLDC GLUCOMTR-MCNC: 206 MG/DL — HIGH (ref 70–99)
GLUCOSE BLDC GLUCOMTR-MCNC: 263 MG/DL — HIGH (ref 70–99)
GLUCOSE SERPL-MCNC: 194 MG/DL — HIGH (ref 70–99)
HCT VFR BLD CALC: 33.3 % — LOW (ref 34.5–45)
HGB BLD-MCNC: 9.8 G/DL — LOW (ref 11.5–15.5)
MAGNESIUM SERPL-MCNC: 1.7 MG/DL — SIGNIFICANT CHANGE UP (ref 1.6–2.6)
MCHC RBC-ENTMCNC: 22.7 PG — LOW (ref 27–34)
MCHC RBC-ENTMCNC: 29.4 GM/DL — LOW (ref 32–36)
MCV RBC AUTO: 77.1 FL — LOW (ref 80–100)
NRBC # BLD: 0 /100 WBCS — SIGNIFICANT CHANGE UP (ref 0–0)
PLATELET # BLD AUTO: 322 K/UL — SIGNIFICANT CHANGE UP (ref 150–400)
POTASSIUM SERPL-MCNC: 4.5 MMOL/L — SIGNIFICANT CHANGE UP (ref 3.5–5.3)
POTASSIUM SERPL-SCNC: 4.5 MMOL/L — SIGNIFICANT CHANGE UP (ref 3.5–5.3)
RBC # BLD: 4.32 M/UL — SIGNIFICANT CHANGE UP (ref 3.8–5.2)
RBC # FLD: 14.1 % — SIGNIFICANT CHANGE UP (ref 10.3–14.5)
SODIUM SERPL-SCNC: 135 MMOL/L — SIGNIFICANT CHANGE UP (ref 135–145)
WBC # BLD: 9.4 K/UL — SIGNIFICANT CHANGE UP (ref 3.8–10.5)
WBC # FLD AUTO: 9.4 K/UL — SIGNIFICANT CHANGE UP (ref 3.8–10.5)

## 2019-10-03 PROCEDURE — 71046 X-RAY EXAM CHEST 2 VIEWS: CPT | Mod: 26

## 2019-10-03 RX ORDER — ACETAMINOPHEN 500 MG
2 TABLET ORAL
Qty: 0 | Refills: 0 | DISCHARGE
Start: 2019-10-03

## 2019-10-03 RX ORDER — INSULIN LISPRO 100/ML
16 VIAL (ML) SUBCUTANEOUS
Qty: 1 | Refills: 0
Start: 2019-10-03 | End: 2019-11-01

## 2019-10-03 RX ORDER — ESCITALOPRAM OXALATE 5 MG/1
5 TABLET ORAL TWICE DAILY
Refills: 0 | Status: ACTIVE | COMMUNITY

## 2019-10-03 RX ORDER — INSULIN GLARGINE 100 [IU]/ML
24 INJECTION, SOLUTION SUBCUTANEOUS
Qty: 1 | Refills: 0
Start: 2019-10-03 | End: 2019-11-01

## 2019-10-03 RX ORDER — SIMVASTATIN 20 MG/1
1 TABLET, FILM COATED ORAL
Qty: 30 | Refills: 0
Start: 2019-10-03 | End: 2019-11-01

## 2019-10-03 RX ORDER — METOPROLOL TARTRATE 50 MG
1 TABLET ORAL
Qty: 0 | Refills: 0 | DISCHARGE

## 2019-10-03 RX ORDER — PANTOPRAZOLE 40 MG/1
40 TABLET, DELAYED RELEASE ORAL DAILY
Refills: 5 | Status: ACTIVE | COMMUNITY

## 2019-10-03 RX ORDER — INSULIN LISPRO 100/ML
10 VIAL (ML) SUBCUTANEOUS
Qty: 1 | Refills: 0
Start: 2019-10-03 | End: 2019-11-01

## 2019-10-03 RX ORDER — PANTOPRAZOLE SODIUM 20 MG/1
1 TABLET, DELAYED RELEASE ORAL
Qty: 30 | Refills: 0
Start: 2019-10-03 | End: 2019-11-01

## 2019-10-03 RX ORDER — INSULIN LISPRO 100/ML
10 VIAL (ML) SUBCUTANEOUS
Refills: 0 | Status: DISCONTINUED | OUTPATIENT
Start: 2019-10-03 | End: 2019-10-03

## 2019-10-03 RX ORDER — ASPIRIN 81 MG
81 TABLET, DELAYED RELEASE (ENTERIC COATED) ORAL DAILY
Qty: 1 | Refills: 5 | Status: ACTIVE | COMMUNITY

## 2019-10-03 RX ORDER — ASPIRIN/CALCIUM CARB/MAGNESIUM 324 MG
1 TABLET ORAL
Qty: 0 | Refills: 0 | DISCHARGE

## 2019-10-03 RX ORDER — SIMVASTATIN 20 MG/1
1 TABLET, FILM COATED ORAL
Qty: 0 | Refills: 0 | DISCHARGE

## 2019-10-03 RX ORDER — ASPIRIN/CALCIUM CARB/MAGNESIUM 324 MG
1 TABLET ORAL
Qty: 30 | Refills: 0
Start: 2019-10-03 | End: 2019-11-01

## 2019-10-03 RX ORDER — METOPROLOL TARTRATE 50 MG
1 TABLET ORAL
Qty: 60 | Refills: 0
Start: 2019-10-03 | End: 2019-11-01

## 2019-10-03 RX ORDER — METFORMIN HYDROCHLORIDE 850 MG/1
1 TABLET ORAL
Qty: 60 | Refills: 0
Start: 2019-10-03 | End: 2019-11-01

## 2019-10-03 RX ORDER — OXYCODONE HYDROCHLORIDE AND ACETAMINOPHEN 5; 325 MG/1; MG/1
5-325 TABLET ORAL
Refills: 0 | Status: ACTIVE | COMMUNITY

## 2019-10-03 RX ORDER — MAGNESIUM OXIDE 400 MG ORAL TABLET 241.3 MG
800 TABLET ORAL ONCE
Refills: 0 | Status: COMPLETED | OUTPATIENT
Start: 2019-10-03 | End: 2019-10-03

## 2019-10-03 RX ORDER — ENOXAPARIN SODIUM 100 MG/ML
24 INJECTION SUBCUTANEOUS
Qty: 1 | Refills: 0
Start: 2019-10-03 | End: 2019-11-01

## 2019-10-03 RX ORDER — POTASSIUM CHLORIDE 20 MEQ
1 PACKET (EA) ORAL
Qty: 30 | Refills: 0
Start: 2019-10-03 | End: 2019-11-01

## 2019-10-03 RX ORDER — MECLIZINE HCL 12.5 MG
1 TABLET ORAL
Qty: 60 | Refills: 0
Start: 2019-10-03 | End: 2019-11-01

## 2019-10-03 RX ORDER — MECLIZINE HYDROCHLORIDE 25 MG/1
25 TABLET ORAL TWICE DAILY
Refills: 0 | Status: ACTIVE | COMMUNITY

## 2019-10-03 RX ORDER — SENNA PLUS 8.6 MG/1
2 TABLET ORAL
Qty: 14 | Refills: 0
Start: 2019-10-03 | End: 2019-10-09

## 2019-10-03 RX ORDER — INSULIN LISPRO 100 [IU]/ML
100 INJECTION, SOLUTION INTRAVENOUS; SUBCUTANEOUS
Refills: 0 | Status: ACTIVE | COMMUNITY

## 2019-10-03 RX ORDER — CLOPIDOGREL BISULFATE 75 MG/1
75 TABLET, FILM COATED ORAL DAILY
Refills: 0 | Status: ACTIVE | COMMUNITY

## 2019-10-03 RX ORDER — METFORMIN HYDROCHLORIDE 1000 MG/1
1000 TABLET, COATED ORAL TWICE DAILY
Qty: 60 | Refills: 2 | Status: ACTIVE | COMMUNITY

## 2019-10-03 RX ORDER — FUROSEMIDE 40 MG
1 TABLET ORAL
Qty: 30 | Refills: 0
Start: 2019-10-03 | End: 2019-11-01

## 2019-10-03 RX ORDER — INSULIN GLARGINE 100 [IU]/ML
28 INJECTION, SOLUTION SUBCUTANEOUS
Qty: 1 | Refills: 0
Start: 2019-10-03 | End: 2019-11-01

## 2019-10-03 RX ORDER — SIMVASTATIN 20 MG/1
20 TABLET, FILM COATED ORAL
Qty: 30 | Refills: 3 | Status: ACTIVE | COMMUNITY

## 2019-10-03 RX ORDER — DOCUSATE SODIUM 100 MG
1 CAPSULE ORAL
Qty: 21 | Refills: 0
Start: 2019-10-03 | End: 2019-10-09

## 2019-10-03 RX ORDER — CLOPIDOGREL BISULFATE 75 MG/1
1 TABLET, FILM COATED ORAL
Qty: 30 | Refills: 0
Start: 2019-10-03 | End: 2019-11-01

## 2019-10-03 RX ORDER — MECLIZINE HCL 12.5 MG
1 TABLET ORAL
Qty: 0 | Refills: 0 | DISCHARGE

## 2019-10-03 RX ORDER — INSULIN GLARGINE 100 [IU]/ML
24 INJECTION, SOLUTION SUBCUTANEOUS AT BEDTIME
Refills: 0 | Status: DISCONTINUED | OUTPATIENT
Start: 2019-10-03 | End: 2019-10-03

## 2019-10-03 RX ORDER — METFORMIN HYDROCHLORIDE 850 MG/1
1 TABLET ORAL
Qty: 0 | Refills: 0 | DISCHARGE

## 2019-10-03 RX ORDER — SITAGLIPTIN 50 MG/1
1 TABLET, FILM COATED ORAL
Qty: 0 | Refills: 0 | DISCHARGE

## 2019-10-03 RX ORDER — GLIMEPIRIDE 1 MG
1 TABLET ORAL
Qty: 0 | Refills: 0 | DISCHARGE

## 2019-10-03 RX ADMIN — POLYETHYLENE GLYCOL 3350 17 GRAM(S): 17 POWDER, FOR SOLUTION ORAL at 13:01

## 2019-10-03 RX ADMIN — OXYCODONE AND ACETAMINOPHEN 2 TABLET(S): 5; 325 TABLET ORAL at 13:05

## 2019-10-03 RX ADMIN — MAGNESIUM OXIDE 400 MG ORAL TABLET 800 MILLIGRAM(S): 241.3 TABLET ORAL at 10:40

## 2019-10-03 RX ADMIN — ESCITALOPRAM OXALATE 5 MILLIGRAM(S): 10 TABLET, FILM COATED ORAL at 12:58

## 2019-10-03 RX ADMIN — Medication 7 UNIT(S): at 07:46

## 2019-10-03 RX ADMIN — CLOPIDOGREL BISULFATE 75 MILLIGRAM(S): 75 TABLET, FILM COATED ORAL at 12:59

## 2019-10-03 RX ADMIN — LIDOCAINE 1 PATCH: 4 CREAM TOPICAL at 07:08

## 2019-10-03 RX ADMIN — SODIUM CHLORIDE 3 MILLILITER(S): 9 INJECTION INTRAMUSCULAR; INTRAVENOUS; SUBCUTANEOUS at 06:24

## 2019-10-03 RX ADMIN — HEPARIN SODIUM 5000 UNIT(S): 5000 INJECTION INTRAVENOUS; SUBCUTANEOUS at 06:23

## 2019-10-03 RX ADMIN — Medication 40 MILLIGRAM(S): at 06:23

## 2019-10-03 RX ADMIN — OXYCODONE AND ACETAMINOPHEN 2 TABLET(S): 5; 325 TABLET ORAL at 10:40

## 2019-10-03 RX ADMIN — Medication 4: at 07:46

## 2019-10-03 RX ADMIN — Medication 10 UNIT(S): at 12:52

## 2019-10-03 RX ADMIN — Medication 100 MILLIGRAM(S): at 06:23

## 2019-10-03 RX ADMIN — Medication 25 MILLIGRAM(S): at 06:23

## 2019-10-03 RX ADMIN — Medication 81 MILLIGRAM(S): at 12:59

## 2019-10-03 RX ADMIN — Medication 37.5 MILLIGRAM(S): at 00:01

## 2019-10-03 RX ADMIN — LIDOCAINE 1 PATCH: 4 CREAM TOPICAL at 13:01

## 2019-10-03 RX ADMIN — Medication 37.5 MILLIGRAM(S): at 12:56

## 2019-10-03 RX ADMIN — Medication 6: at 12:53

## 2019-10-03 RX ADMIN — Medication 37.5 MILLIGRAM(S): at 06:22

## 2019-10-03 RX ADMIN — PANTOPRAZOLE SODIUM 40 MILLIGRAM(S): 20 TABLET, DELAYED RELEASE ORAL at 06:23

## 2019-10-03 NOTE — DISCHARGE NOTE NURSING/CASE MANAGEMENT/SOCIAL WORK - NSDCFUADDAPPT_GEN_ALL_CORE_FT
-Please follow up with Dr. Arenas on 10/8/19 at 10:15AM.  The office is located at [facility], [facility], 4th floor. Call us with any questions #[phone].  - Please also follow up with Dr. Radha Palomo (referring MD) within 1-2 weeks from the date of discharge to guide your recovery from surgery. Our office will call you on Tuesday with your appointment, but if for some reason you do not hear from us, please call to confirm.  - Please follow up with your primary care physician, Dr. Dee, on Thursday 10/10/19 at 11:30AM, who will continue to follow you and manage any medication changes.          -You can also follow up with [facility] by calling [phone] to make an appointment.

## 2019-10-03 NOTE — PROGRESS NOTE ADULT - SUBJECTIVE AND OBJECTIVE BOX
Patient discussed on morning rounds with Dr. Salcido (covering for Dr. Arenas)    Operation / Date:  9/27/19 MIDCAB, 10/2/19 PCI    Surgeon: Dr. Arenas, Dr. Palomo    Referring Physician: Dr. Palomo    SUBJECTIVE ASSESSMENT   59y Female     AND HOSPITAL COURSE:    Vital Signs Last 24 Hrs  T(C): 37.2 (03 Oct 2019 09:50), Max: 38 (02 Oct 2019 22:01)  T(F): 99 (03 Oct 2019 09:50), Max: 100.4 (02 Oct 2019 22:01)  HR: 92 (03 Oct 2019 08:35) (84 - 102)  BP: 106/60 (03 Oct 2019 08:35) (106/60 - 150/71)  BP(mean): 77 (03 Oct 2019 08:35) (77 - 102)  RR: 20 (03 Oct 2019 08:35) (18 - 20)  SpO2: 94% (03 Oct 2019 08:35) (94% - 98%)    EPICARDIAL WIRES REMOVED: Yes  TIE DOWNS REMOVED: Yes    PHYSICAL EXAM:    General:     Neurological:    Cardiovascular:    Respiratory:    Gastrointestinal:    Extremities:    Vascular:    Incision Sites:    LABS:                        9.8    9.40  )-----------( 322      ( 03 Oct 2019 05:40 )             33.3       COUMADIN: No.          PT/INR - ( 02 Oct 2019 06:14 )   PT: 11.7 sec;   INR: 1.03          PTT - ( 02 Oct 2019 06:14 )  PTT:29.7 sec    10-03    135  |  100  |  18  ----------------------------<  194<H>  4.5   |  26  |  0.76    Ca    9.0      03 Oct 2019 05:40  Phos  4.3     10-02  Mg     1.7     10-03            Discharge CXR:   < from: Xray Chest 2 Views PA/Lat (10.02.19 @ 09:07) >  EXAM:  XR CHEST PA LAT 2V                          PROCEDURE DATE:  10/02/2019          INTERPRETATION:  Clinical history/reason for exam: Postop.    PA and lateral.    Comparison: October 1, 2019 time 10:57 AM.    Findings/  impression: Right perihilar focal atelectasis, new.. Otherwise, stable   lung pathology and heart size.. Right paratracheal calcifications.,      < end of copied text >    Discharge ECHO: Patient discussed on morning rounds with Dr. Salcido (covering for Dr. Arenas)    Operation / Date:  9/27/19 MIDCAB, 10/2/19 PCI    Surgeon: Dr. Arenas, Dr. Palomo    Referring Physician: Dr. Palomo    SUBJECTIVE ASSESSMENT   59y Female seen and examined bedside, son is at bedside whom aided with translation. Patient is not offering any acute complaints and states she feels ready for discharge home today. She agrees to attend all her follow up appointments and will contact her PCP, or our office, if she has any questions, especially with her new insulin regimen. She is moving her bowels postop, and using IS, pulling 500cc. She is ambulating in the halls on room air. Denies chest pain, SOB, palpitations, hemopytsis, N/V/D, abdominal pain, dizziness, fever or chills.     AND HOSPITAL COURSE:  59 year old female, former smoker, with PMHx of HTN, HLD, DM, Carotid Artery Disease s/p R CEA presented to [facility] for elective cardiac catheterization revealing 2V CAD and Cardiothoracic surgery, Dr. Franco Arenas, was consulted for surgical evaluation. Preoperative workup was completed and on 9/27/19 patient underwent Minimally Invasive Direct Coronary Artery Bypass (MIDCAB) procedure with Dr. Arenas. Procedure was uncomplicated and she arrived to CT ICU post operatively intubated and stable. She was quickly extubated POD#0, required cleviprex post operatively for HTN and weaned off all gtts by POD#1. She continued to remain stable and transferred to stepdown telemetry unit on POD#2. Endocrine was consulted for hgba1c of 9.2 and insulin regimen was adjusted. On 10/2/19, patient underwent completion hybrid procedure and underwent GLORIA x 2 OM1 (80%), GLORIA D1 (90%), GLORIA D2 (90%), mLAD . Of note, on bedside ultrasound on POD5, patient noted to have small left pleural effusion, given IV diuretics and diuresed well, on repeat bedside ultrasound today on POD6, effusion appears to be decreased in size. Patient is saturating well on room air and ambulating multiple times in the halls. Patient will go home with Lasix, as well as Aspirin, Plavix, Metoprolol and Statin. Per Endocrine recommendations, patient will go home on Lantus 24U qhs and Lispro 10U TID, as well as Metformin, which has been sent and confirmed with her pharmacy. Patient has been educated by our diabetic educator on the use of insulin at home and agrees to follow up closely with her PCP.   35 minutes was spent with the patient reviewing the discharge material including medications, follow up appointments, recovery, concerning symptoms, and how to contact their health care providers if they have questions    CABG  Aspirin               [ X ] Yes    Beta-Blocker     [ X ] Yes   Statin                 [ X ] Yes        Vital Signs Last 24 Hrs  T(C): 37.2 (03 Oct 2019 09:50), Max: 38 (02 Oct 2019 22:01)  T(F): 99 (03 Oct 2019 09:50), Max: 100.4 (02 Oct 2019 22:01)  HR: 92 (03 Oct 2019 08:35) (84 - 102)  BP: 106/60 (03 Oct 2019 08:35) (106/60 - 150/71)  BP(mean): 77 (03 Oct 2019 08:35) (77 - 102)  RR: 20 (03 Oct 2019 08:35) (18 - 20)  SpO2: 94% (03 Oct 2019 08:35) (94% - 98%)    EPICARDIAL WIRES REMOVED: Yes  TIE DOWNS REMOVED: Yes    PHYSICAL EXAM:    General: Patient lying comfortably in bed, no acute distress     Neurological: Alert and oriented. No focal neurological deficits     Cardiovascular: S1S2, RRR, no murmurs appreciated on exam     Respiratory: Slightly diminished at the left base otherwise clear to ausculation bilaterally, no w/r/r    Gastrointestinal: Abdomen soft, non tender, non distended     Extremities: Warm and well perfused. No peripheral edema or calf tenderness     Vascular: Peripheral pulses 2+ b/l.    Incision Sites: L mini thoracotomy: c/d/i, no signs of infection. All tiedowns removed from chest tube sites and covered with clean dry occlusive dressing.       LABS:                        9.8    9.40  )-----------( 322      ( 03 Oct 2019 05:40 )             33.3       COUMADIN: No.          PT/INR - ( 02 Oct 2019 06:14 )   PT: 11.7 sec;   INR: 1.03          PTT - ( 02 Oct 2019 06:14 )  PTT:29.7 sec    10-03    135  |  100  |  18  ----------------------------<  194<H>  4.5   |  26  |  0.76    Ca    9.0      03 Oct 2019 05:40  Phos  4.3     10-02  Mg     1.7     10-03            Discharge CXR:   < from: Xray Chest 2 Views PA/Lat (10.02.19 @ 09:07) >  EXAM:  XR CHEST PA LAT 2V                          PROCEDURE DATE:  10/02/2019          INTERPRETATION:  Clinical history/reason for exam: Postop.    PA and lateral.    Comparison: October 1, 2019 time 10:57 AM.    Findings/  impression: Right perihilar focal atelectasis, new.. Otherwise, stable   lung pathology and heart size.. Right paratracheal calcifications.,      < end of copied text >    Discharge ECHO:

## 2019-10-03 NOTE — PROGRESS NOTE ADULT - ASSESSMENT
-Please follow up with Dr. Arenas on 10/8/19 at 10:15AM.  The office is located at [facility], [facility], 4th floor. Call us with any questions #[phone].  - Please also follow up with Dr. Radha Palomo (referring MD) within 1-2 weeks from the date of discharge to guide your recovery from surgery. Our office will call you on Tuesday with your appointment, but if for some reason you do not hear from us, please call to confirm.  - Please follow up with your primary care physician, Dr. Dee, on Thursday 10/10/19 at 11:30AM, who will continue to follow you and manage any medication changes.          -You can also follow up with [facility] by calling [phone] to make an appointment.    Continue a diet low in sodium and low in cholesterol.   Continue to check your blood glucose levels 4x a day (with meals and at bedtime). Continue to use your insulin as prescribed. You were educated on how to use insulin at home, however, feel free to call us or Endocrinology with any questions. If your blood glucose levels are consistently less than 80 or over 150, please contact your primary care physician or Endocrinologist.  -Walk daily as tolerated and use your incentive spirometer every hour.    -No driving or strenuous activity/exercise for 6 weeks, or until cleared by your surgeon.    -Gently clean your incisions with anti-bacterial soap and water, pat dry.  You may leave them open to air.    -Call your doctor if you have shortness of breath, chest pain not relieved by pain medication, dizziness, fever >101.5, or increased redness or drainage from incisions.

## 2019-10-03 NOTE — DISCHARGE NOTE PROVIDER - CARE PROVIDER_API CALL
Franco Arenas)  Cardiovascular Surgery  [address], 4th [place] [address]  Phone: [phone]  Fax: [phone]  Follow Up Time:     Radha Palomo)  Cardiovascular Disease; Internal Medicine  [address] [place] [address]  Phone: [phone]  Fax: [phone]  Follow Up Time:

## 2019-10-03 NOTE — DISCHARGE NOTE PROVIDER - NSDCCPTREATMENT_GEN_ALL_CORE_FT
PRINCIPAL PROCEDURE  Procedure: Minimally invasive direct coronary artery bypass (MIDCAB) with transesophageal echocardiography (GERMAN)  Findings and Treatment: LIMA to LAD      SECONDARY PROCEDURE  Procedure: Percutaneous coronary intervention (PCI) with insertion of stent  Findings and Treatment:

## 2019-10-03 NOTE — DISCHARGE NOTE PROVIDER - CARE PROVIDERS DIRECT ADDRESSES
[email or web address]

## 2019-10-03 NOTE — PROGRESS NOTE ADULT - REASON FOR ADMISSION
SOB with abnormal nuclear stress test

## 2019-10-03 NOTE — DISCHARGE NOTE PROVIDER - NSDCFUADDAPPT_GEN_ALL_CORE_FT
-Please follow up with Dr. Arenas on 10/8/19 at 10:15AM.  The office is located at [facility], 4th floor. Call us with any questions #[phone].  - Please also follow up with Dr. Radha Palomo (referring MD) within 1-2 weeks from the date of discharge to guide your recovery from surgery. Our office will call you on Tuesday with your appointment, but if for some reason you do not hear from us, please call to confirm. -Please follow up with Dr. Arenas on 10/8/19 at 10:15AM.  The office is located at [facility], 4th floor. Call us with any questions #[phone].  - Please also follow up with Dr. Radha Palomo (referring MD) within 1-2 weeks from the date of discharge to guide your recovery from surgery. Our office will call you on Tuesday with your appointment, but if for some reason you do not hear from us, please call to confirm.  - Please follow up with your primary care physician, who will continue to manage your diabetes regimen.                   - You can also follow up with [facility] by calling  to make an appointment. -Please follow up with Dr. Arenas on 10/8/19 at 10:15AM.  The office is located at [facility], 4th floor. Call us with any questions #[phone].  - Please also follow up with Dr. Radha Palomo (referring MD) within 1-2 weeks from the date of discharge to guide your recovery from surgery. Our office will call you on Tuesday with your appointment, but if for some reason you do not hear from us, please call to confirm.  - Please follow up with your primary care physician, Dr. Dee, on Thursday 10/10/19 at 11:30AM, who will continue to follow you and manage any medication changes.          -You can also follow up with [facility] by calling [phone] to make an appointment.

## 2019-10-03 NOTE — DISCHARGE NOTE NURSING/CASE MANAGEMENT/SOCIAL WORK - NSDCPEWEB_GEN_ALL_CORE
[place] website --- [email or web address] website --- [email or web address]

## 2019-10-03 NOTE — DISCHARGE NOTE PROVIDER - INSTRUCTIONS
Continue a diet low in sodium and low in cholesterol.   Continue to check your blood glucose levels 4x a day (with meals and at bedtime). Continue to use your insulin as prescribed. You were educated on how to use insulin at home, however, feel free to call us or Endocrinology with any questions. If your blood glucose levels are consistently less than 80 or over 150, please contact your primary care physician or Endocrinologist.

## 2019-10-03 NOTE — DISCHARGE NOTE PROVIDER - HOSPITAL COURSE
59 year old female, former smoker, with PMHx of HTN, HLD, DM, Carotid Artery Disease s/p R CEA presented to [facility] for elective cardiac catheterization revealing 2V CAD and Cardiothoracic surgery, Dr. Franco Arenas, was consulted for surgical evaluation. Preoperative workup was completed and on 9/27/19 patient underwent Minimally Invasive Direct Coronary Artery Bypass (MIDCAB) procedure with Dr. Arenas. Procedure was uncomplicated and she arrived to CT ICU post operatively intubated and stable. She was quickly extubated POD#0, required cleviprex post operatively for HTN and weaned off all gtts by POD#1. She continued to remain stable and transferred to stepdown telemetry unit on POD#2. Endocrine was consulted for hgba1c of 9.2 and insulin regimen was adjusted. On 10/2/19, patient underwent completion hybrid procedure and underwent GLORIA x 2 OM1 (80%), GLORIA D1 (90%), GLORIA D2 (90%), mLAD . 59 year old female, former smoker, with PMHx of HTN, HLD, DM, Carotid Artery Disease s/p R CEA presented to [facility] for elective cardiac catheterization revealing 2V CAD and Cardiothoracic surgery, Dr. Franco Arenas, was consulted for surgical evaluation. Preoperative workup was completed and on 9/27/19 patient underwent Minimally Invasive Direct Coronary Artery Bypass (MIDCAB) procedure with Dr. Arenas. Procedure was uncomplicated and she arrived to CT ICU post operatively intubated and stable. She was quickly extubated POD#0, required cleviprex post operatively for HTN and weaned off all gtts by POD#1. She continued to remain stable and transferred to stepdown telemetry unit on POD#2. Endocrine was consulted for hgba1c of 9.2 and insulin regimen was adjusted. On 10/2/19, patient underwent completion hybrid procedure and underwent GLORIA x 2 OM1 (80%), GLORIA D1 (90%), GLORIA D2 (90%), mLAD . Of note, on bedside ultrasound on POD5, patient noted to have small left pleural effusion, given IV diuretics and diuresed well, on repeat bedside ultrasound today on POD6, effusion appears to be decreased in size. Patient is saturating well on room air and ambulating multiple times in the halls. Patient will go home with Lasix, as well as Aspirin, Plavix, Metoprolol and Statin. Per Endocrine recommendations, patient will go home on Lantus 24U qhs and Lispro 10U TID, as well as Metformin. Patient has been educated on the use of insulin at home and agrees to follow up closely with her PCP.     35 minutes was spent with the patient reviewing the discharge material including medications, follow up appointments, recovery, concerning symptoms, and how to contact their health care providers if they have questions        CABG    Aspirin               [ X ] Yes      Beta-Blocker     [ X ] Yes     Statin                 [ X ] Yes 59 year old female, former smoker, with PMHx of HTN, HLD, DM, Carotid Artery Disease s/p R CEA presented to [facility] for elective cardiac catheterization revealing 2V CAD and Cardiothoracic surgery, Dr. Franco Arenas, was consulted for surgical evaluation. Preoperative workup was completed and on 9/27/19 patient underwent Minimally Invasive Direct Coronary Artery Bypass (MIDCAB) procedure with Dr. Arenas. Procedure was uncomplicated and she arrived to CT ICU post operatively intubated and stable. She was quickly extubated POD#0, required cleviprex post operatively for HTN and weaned off all gtts by POD#1. She continued to remain stable and transferred to stepdown telemetry unit on POD#2. Endocrine was consulted for hgba1c of 9.2 and insulin regimen was adjusted. On 10/2/19, patient underwent completion hybrid procedure and underwent GLORIA x 2 OM1 (80%), GLORIA D1 (90%), GLORIA D2 (90%), mLAD . Of note, on bedside ultrasound on POD5, patient noted to have small left pleural effusion, given IV diuretics and diuresed well, on repeat bedside ultrasound today on POD6, effusion appears to be decreased in size. Patient is saturating well on room air and ambulating multiple times in the halls. Patient will go home with Lasix, as well as Aspirin, Plavix, Metoprolol and Statin. Per Endocrine recommendations, patient will go home on Lantus 24U qhs and Lispro 10U TID, as well as Metformin, which has been sent and confirmed with her pharmacy. Patient has been educated by our diabetic educator on the use of insulin at home and agrees to follow up closely with her PCP.     35 minutes was spent with the patient reviewing the discharge material including medications, follow up appointments, recovery, concerning symptoms, and how to contact their health care providers if they have questions        CABG    Aspirin               [ X ] Yes      Beta-Blocker     [ X ] Yes     Statin                 [ X ] Yes 59 year old female, former smoker, with PMHx of HTN, HLD, DM, Carotid Artery Disease s/p R CEA presented to [facility] for elective cardiac catheterization revealing 2V CAD and Cardiothoracic surgery, Dr. Franco Arenas, was consulted for surgical evaluation. Preoperative workup was completed and on 9/27/19 patient underwent Minimally Invasive Direct Coronary Artery Bypass (MIDCAB) procedure with Dr. Arenas. Procedure was uncomplicated and she arrived to CT ICU post operatively intubated and stable. She was quickly extubated POD#0, required cleviprex post operatively for HTN and weaned off all gtts by POD#1. She continued to remain stable and transferred to stepdown telemetry unit on POD#2. Endocrine was consulted for hgba1c of 9.2 and insulin regimen was adjusted. On 10/2/19, patient underwent completion hybrid procedure and underwent GLORIA x 2 OM1 (80%), GLORIA D1 (90%), GLORIA D2 (90%), mLAD . Of note, on bedside ultrasound on POD5, patient noted to have small left pleural effusion, given IV diuretics and diuresed well, on repeat bedside ultrasound today on POD6, effusion appears to be decreased in size. Patient is saturating well on room air and ambulating multiple times in the halls. Patient will go home with Lasix, as well as Aspirin, Plavix, Metoprolol and Statin. Per Endocrine recommendations, patient will go home on Lantus 28U qhs and Lispro 16U TID, as well as Metformin, which has been sent and confirmed with her pharmacy. Patient has been educated by our diabetic educator on the use of insulin at home and agrees to follow up closely with her PCP.     35 minutes was spent with the patient reviewing the discharge material including medications, follow up appointments, recovery, concerning symptoms, and how to contact their health care providers if they have questions        CABG    Aspirin               [ X ] Yes      Beta-Blocker     [ X ] Yes     Statin                 [ X ] Yes

## 2019-10-03 NOTE — PROGRESS NOTE ADULT - SUBJECTIVE AND OBJECTIVE BOX
INTERVAL HPI/OVERNIGHT EVENTS:    Patient is a 59y old  Female who presents with a chief complaint of SOB with abnormal nuclear stress test (02 Oct 2019 14:40)    FSG & Insulin received:  Yesterday:  pre-dinner fsg:  nutritional lispro   units +   units lispro SS  bedtime fsg:  lantus   units +    units lispro SS    Today:  pre-breakfast fsg:  nutritional lispro   units+    units lispro SS  pre-lunch fsg:  nutritional lispro   units+   units lispro SS      Pt reports the following symptoms:    CONSTITUTIONAL:  Negative fever or chills, feels well, good appetite  EYES:  Negative  blurry vision or double vision  CARDIOVASCULAR:  Negative for chest pain or palpitations  RESPIRATORY:  Negative for cough, wheezing, or SOB   GASTROINTESTINAL:  Negative for nausea, vomiting, diarrhea, constipation, or abdominal pain  GENITOURINARY:  Negative frequency, urgency or dysuria  NEUROLOGIC:  No headache, confusion, dizziness, lightheadedness    MEDICATIONS  (STANDING):  aspirin  chewable 81 milliGRAM(s) Oral daily  bisacodyl 5 milliGRAM(s) Oral at bedtime  clopidogrel Tablet 75 milliGRAM(s) Oral daily  dextrose 5%. 1000 milliLiter(s) (50 mL/Hr) IV Continuous <Continuous>  dextrose 50% Injectable 12.5 Gram(s) IV Push once  dextrose 50% Injectable 25 Gram(s) IV Push once  dextrose 50% Injectable 25 Gram(s) IV Push once  docusate sodium 100 milliGRAM(s) Oral three times a day  escitalopram 5 milliGRAM(s) Oral daily  furosemide    Tablet 40 milliGRAM(s) Oral daily  heparin  Injectable 5000 Unit(s) SubCutaneous every 8 hours  insulin glargine Injectable (LANTUS) 24 Unit(s) SubCutaneous at bedtime  insulin lispro (HumaLOG) corrective regimen sliding scale   SubCutaneous Before meals and at bedtime  insulin lispro Injectable (HumaLOG) 10 Unit(s) SubCutaneous three times a day before meals  lidocaine   Patch 1 Patch Transdermal every 24 hours  meclizine 25 milliGRAM(s) Oral every 12 hours  metoprolol tartrate 37.5 milliGRAM(s) Oral every 6 hours  pantoprazole    Tablet 40 milliGRAM(s) Oral before breakfast  polyethylene glycol 3350 17 Gram(s) Oral daily  senna 2 Tablet(s) Oral at bedtime  simvastatin 20 milliGRAM(s) Oral at bedtime  sodium chloride 0.9% lock flush 3 milliLiter(s) IV Push every 8 hours  sodium chloride 0.9%. 1000 milliLiter(s) (10 mL/Hr) IV Continuous <Continuous>  sodium chloride 0.9%. 500 milliLiter(s) (75 mL/Hr) IV Continuous <Continuous>    MEDICATIONS  (PRN):  acetaminophen   Tablet .. 650 milliGRAM(s) Oral every 6 hours PRN Temp greater or equal to 38C (100.4F), Mild Pain (1 - 3)  dextrose 40% Gel 15 Gram(s) Oral once PRN Blood Glucose LESS THAN 70 milliGRAM(s)/deciliter  glucagon  Injectable 1 milliGRAM(s) IntraMuscular once PRN Glucose LESS THAN 70 milligrams/deciliter  glucagon  Injectable 1 milliGRAM(s) IntraMuscular Once PRN Glucose LESS THAN 70 milligrams/deciliter  oxyCODONE    5 mG/acetaminophen 325 mG 1 Tablet(s) Oral every 6 hours PRN Moderate Pain (4 - 6)  oxyCODONE    5 mG/acetaminophen 325 mG 2 Tablet(s) Oral every 6 hours PRN Severe Pain (7 - 10)      PHYSICAL EXAM  Vital Signs Last 24 Hrs  T(C): 37.2 (03 Oct 2019 09:50), Max: 38 (02 Oct 2019 22:01)  T(F): 99 (03 Oct 2019 09:50), Max: 100.4 (02 Oct 2019 22:01)  HR: 92 (03 Oct 2019 08:35) (84 - 102)  BP: 106/60 (03 Oct 2019 08:35) (106/60 - 150/71)  BP(mean): 77 (03 Oct 2019 08:35) (77 - 102)  RR: 20 (03 Oct 2019 08:35) (18 - 20)  SpO2: 94% (03 Oct 2019 08:35) (94% - 98%)    Constitutional: wn/wd in NAD.   HEENT: NCAT, MMM, OP clear, EOMI, no proptosis or lid retraction  Neck: no thyromegaly or palpable thyroid nodules   Respiratory: lungs CTAB.  Cardiovascular: regular rhythm, normal S1 and S2, no audible murmurs, no peripheral edema  GI: soft, NT/ND, no masses/HSM appreciated.  Neurology: no tremors, DTR 2+  Skin: no visible rashes/lesions  Psychiatric: AAO x 3, normal affect/mood.    LABS:                        9.8    9.40  )-----------( 322      ( 03 Oct 2019 05:40 )             33.3     10-03    135  |  100  |  18  ----------------------------<  194<H>  4.5   |  26  |  0.76    Ca    9.0      03 Oct 2019 05:40  Phos  4.3     10-02  Mg     1.7     10-03      PT/INR - ( 02 Oct 2019 06:14 )   PT: 11.7 sec;   INR: 1.03          PTT - ( 02 Oct 2019 06:14 )  PTT:29.7 sec    Thyroid Stimulating Hormone, Serum: 0.723 uIU/mL (09-26 @ 05:48)      HbA1C: 9.2 % (09-26 @ 11:39)  9.2 % (09-26 @ 05:48)  9.1 % (09-25 @ 11:22)    CAPILLARY BLOOD GLUCOSE      POCT Blood Glucose.: 206 mg/dL (03 Oct 2019 07:00)  POCT Blood Glucose.: 250 mg/dL (02 Oct 2019 21:24)  POCT Blood Glucose.: 137 mg/dL (02 Oct 2019 17:01)  POCT Blood Glucose.: 138 mg/dL (02 Oct 2019 16:26)  POCT Blood Glucose.: 169 mg/dL (02 Oct 2019 11:31)      Insulin Sliding Scale requirements X 24 Hours:    RADIOLOGY & ADDITIONAL TESTS:    A/P: 59y Female with history of DM type II presenting for       1.  DM -     Please continue           units lantus at bedtime  / in the morning and        units lispro with meals and lispro moderate / low dose sliding scale 4 times daily with meals and at bedtime.  Please continue consistent carbohydrate diet.      Goal FSG is   Will continue to monitor   For discharge, pt can continue    Pt can follow up at discharge with [facility] by calling  to make an appointment.   Will discuss case with     and update primary team INTERVAL HPI/OVERNIGHT EVENTS:    Patient seen and examined at the bedside. She got her PCI done yesterday with 4 stents placed in. She feels better. She is being planned for discharge today    FSG & Insulin received:  Yesterday:  pre-dinner fs, 7 nutritional lispro   units, had chicken, potatoes, soup and beans - 50%  bedtime fs, 20 lantus   units +  4  units lispro SS  Today:  pre-breakfast fs, 7 nutritional lispro   units+ 4   units lispro SS, had milk, eggs, pancakes  pre-lunch fs, 10 nutritional lispro   units+ 6  units lispro SS,       Pt reports the following symptoms:    CONSTITUTIONAL:  Negative fever or chills, feels well, good appetite  EYES:  Negative  blurry vision or double vision  CARDIOVASCULAR:  Negative for chest pain or palpitations  RESPIRATORY:  Negative for cough, wheezing, or SOB   GASTROINTESTINAL:  Negative for nausea, vomiting, diarrhea, constipation, or abdominal pain  GENITOURINARY:  Negative frequency, urgency or dysuria  NEUROLOGIC:  No headache, confusion, dizziness, lightheadedness    MEDICATIONS  (STANDING):  aspirin  chewable 81 milliGRAM(s) Oral daily  bisacodyl 5 milliGRAM(s) Oral at bedtime  clopidogrel Tablet 75 milliGRAM(s) Oral daily  dextrose 5%. 1000 milliLiter(s) (50 mL/Hr) IV Continuous <Continuous>  dextrose 50% Injectable 12.5 Gram(s) IV Push once  dextrose 50% Injectable 25 Gram(s) IV Push once  dextrose 50% Injectable 25 Gram(s) IV Push once  docusate sodium 100 milliGRAM(s) Oral three times a day  escitalopram 5 milliGRAM(s) Oral daily  furosemide    Tablet 40 milliGRAM(s) Oral daily  heparin  Injectable 5000 Unit(s) SubCutaneous every 8 hours  insulin glargine Injectable (LANTUS) 24 Unit(s) SubCutaneous at bedtime  insulin lispro (HumaLOG) corrective regimen sliding scale   SubCutaneous Before meals and at bedtime  insulin lispro Injectable (HumaLOG) 10 Unit(s) SubCutaneous three times a day before meals  lidocaine   Patch 1 Patch Transdermal every 24 hours  meclizine 25 milliGRAM(s) Oral every 12 hours  metoprolol tartrate 37.5 milliGRAM(s) Oral every 6 hours  pantoprazole    Tablet 40 milliGRAM(s) Oral before breakfast  polyethylene glycol 3350 17 Gram(s) Oral daily  senna 2 Tablet(s) Oral at bedtime  simvastatin 20 milliGRAM(s) Oral at bedtime  sodium chloride 0.9% lock flush 3 milliLiter(s) IV Push every 8 hours  sodium chloride 0.9%. 1000 milliLiter(s) (10 mL/Hr) IV Continuous <Continuous>  sodium chloride 0.9%. 500 milliLiter(s) (75 mL/Hr) IV Continuous <Continuous>    MEDICATIONS  (PRN):  acetaminophen   Tablet .. 650 milliGRAM(s) Oral every 6 hours PRN Temp greater or equal to 38C (100.4F), Mild Pain (1 - 3)  dextrose 40% Gel 15 Gram(s) Oral once PRN Blood Glucose LESS THAN 70 milliGRAM(s)/deciliter  glucagon  Injectable 1 milliGRAM(s) IntraMuscular once PRN Glucose LESS THAN 70 milligrams/deciliter  glucagon  Injectable 1 milliGRAM(s) IntraMuscular Once PRN Glucose LESS THAN 70 milligrams/deciliter  oxyCODONE    5 mG/acetaminophen 325 mG 1 Tablet(s) Oral every 6 hours PRN Moderate Pain (4 - 6)  oxyCODONE    5 mG/acetaminophen 325 mG 2 Tablet(s) Oral every 6 hours PRN Severe Pain (7 - 10)      PHYSICAL EXAM  Vital Signs Last 24 Hrs  T(C): 37.2 (03 Oct 2019 09:50), Max: 38 (02 Oct 2019 22:01)  T(F): 99 (03 Oct 2019 09:50), Max: 100.4 (02 Oct 2019 22:01)  HR: 92 (03 Oct 2019 08:35) (84 - 102)  BP: 106/60 (03 Oct 2019 08:35) (106/60 - 150/71)  BP(mean): 77 (03 Oct 2019 08:35) (77 - 102)  RR: 20 (03 Oct 2019 08:35) (18 - 20)  SpO2: 94% (03 Oct 2019 08:35) (94% - 98%)    Constitutional: wn/wd in NAD.   Respiratory: lungs CTAB.  Cardiovascular: regular rhythm, normal S1 and S2, no peripheral edema  GI: soft, NT/ND, no masses/HSM appreciated.  Neurology: no tremors, No focal neurological deficits    LABS:                        9.8    9.40  )-----------( 322      ( 03 Oct 2019 05:40 )             33.3     10-03    135  |  100  |  18  ----------------------------<  194<H>  4.5   |  26  |  0.76    Ca    9.0      03 Oct 2019 05:40  Phos  4.3     10-02  Mg     1.7     10-03      PT/INR - ( 02 Oct 2019 06:14 )   PT: 11.7 sec;   INR: 1.03          PTT - ( 02 Oct 2019 06:14 )  PTT:29.7 sec    Thyroid Stimulating Hormone, Serum: 0.723 uIU/mL ( @ 05:48)      HbA1C: 9.2 % ( @ 11:39)  9.2 % ( @ 05:48)  9.1 % ( @ 11:22)    CAPILLARY BLOOD GLUCOSE      POCT Blood Glucose.: 206 mg/dL (03 Oct 2019 07:00)  POCT Blood Glucose.: 250 mg/dL (02 Oct 2019 21:24)  POCT Blood Glucose.: 137 mg/dL (02 Oct 2019 17:01)  POCT Blood Glucose.: 138 mg/dL (02 Oct 2019 16:26)  POCT Blood Glucose.: 169 mg/dL (02 Oct 2019 11:31)      Insulin Sliding Scale requirements X 24 Hours:    RADIOLOGY & ADDITIONAL TESTS:    A/P: 58 y/o Bulgarian Speaking F former cigarette smoker x 20yrs (quit 2yrs ago), strong FMHX of CAD and PMH of HTN, HLD, DM, history of Carotid Artery Disease s/p R Sided Carotid Endarterectomy s/p MID-CABG.     1.  DM Type 2 - uncontrolled, complicated. Hba1c 9.2  - Please increase to lantus 28 units units at night.  - Please increase to lispro 16  units before each meal.    - Continue moderate dose sliding scale four times daily with meals and at bedtime  - Carb consistent diet  - Pt's fingerstick glucose goal is 120 to 150    For discharge, patient can be discharged on metformin 1000mg BID, Lantus 28 units QHS and Lispro 16 units TID before each meal.    Pt can follow up at discharge with [facility] by calling  to make an appointment.   Discussed case with   and updated primary team    REMINDERS FOR INSULIN/DIABETES SUPPLIES at DISCHARGE:  INSULIN:   Long actin/Basal Insulin: Examples: Toujeo, Basaglar, Tresiba, Lantus   Short acting/Bolus Insulin: Humalog, Admelog, Novolog  Please ensure that BOTH short acting and long acting insulin are prescribed in the same preparation (Ex: PEN vs VIAL/SOLUTION)     TESTING SUPPLIES:   All glucometer supplies should be written as generic to avoid issues with insurance. Use the free text option in [employer]rise prescription writer, and type in glucometer test strips, lancets, etc to order.    If sending patient home on insulin PEN, please send:   •	BD papo insulin pen needles for use up to 4 times daily (total quantity 100)  •	Lancets for use up to 4 times daily (total quantity 100)  •	Glucometer Test strips for use up to 4 times daily (total quantity 100)  •	Alcohol swabs for use up to 4 times daily (total quantity 100)  •	Glucometer (If provided by hospital, still provide scripts for lancets, test strips, and swabs)  If sending patient home on insulin VIAL, please send:   •	Insulin syringes (6mm) - for use up to 4 times daily (total quantity 100)  •	Lancets for use up to 4 times daily (total quantity 100)  •	Generic Glucometer Test strips for use up to 4 times daily (total quantity 100)  •	Alcohol swabs for use up to 4 times daily (total quantity 100)  •	Generic Glucometer (If provided by hospital, still provide scripts for lancets, test strips, and swabs)  •	Do not specify brand for testing supplies (such as contour, freestyle, one touch etc) that way the pharmacy has the freedom to pick and change according to what the insurance dictates.  For patients without insurance:   •	Provide social work with appropriate scripts so they may obtain 1 week of samples  •	Provide with glucometer. Glucometers are located at various nursing stations, the nursing office, education, and endocrine fellows office.  •	Please make appointment with Katarina Champagne NP or Ebony Mercedes RN and SHELLY Romero at the [address] [place] [address] endocrinology clinic. They can see patients without insurance, provide appropriate samples, and assist in getting insurance coverage.     PREFERRED PHARMACY:  [facility] (located on 1st floor next to admitting)  P: [phone]  Hours: M – F 8AM – 8PM, Sat 8AM – 4PM, Sun—closed  If not using [facility], please follow up with chosen pharmacy to ensure insulin prescribed is covered.

## 2019-10-03 NOTE — PROGRESS NOTE ADULT - PROVIDER SPECIALTY LIST ADULT
CT Surgery
Critical Care
Endocrinology
Intervent Cardiology
CT Surgery

## 2019-10-07 ENCOUNTER — APPOINTMENT (OUTPATIENT)
Dept: CARE COORDINATION | Facility: HOME HEALTH | Age: 59
End: 2019-10-07
Payer: MEDICAID

## 2019-10-07 VITALS
BODY MASS INDEX: 28.17 KG/M2 | HEIGHT: 64 IN | DIASTOLIC BLOOD PRESSURE: 70 MMHG | WEIGHT: 165 LBS | HEART RATE: 85 BPM | RESPIRATION RATE: 16 BRPM | SYSTOLIC BLOOD PRESSURE: 114 MMHG | OXYGEN SATURATION: 97 %

## 2019-10-07 DIAGNOSIS — R42 DIZZINESS AND GIDDINESS: ICD-10-CM

## 2019-10-07 PROCEDURE — 99024 POSTOP FOLLOW-UP VISIT: CPT

## 2019-10-07 NOTE — HISTORY OF PRESENT ILLNESS
[FreeTextEntry1] : 59 YOF with pmhx including CAD, DM, Carotid dx, pt underwent MIDCAB x1 with ROJAS with Dr. Arenas on 9/27/19. Post op course uneventful and pt discharged home with support of home care services and her family. Initial [facility] visit. \par CC "I"m feeling ok, but I still have pain"

## 2019-10-07 NOTE — REASON FOR VISIT
[Follow-Up: _____] : a [unfilled] follow-up visit [Family Member] : family member [FreeTextEntry1] : FOLLOW YOUR HEART- Transitional Care Management Program- [facility]\par \par

## 2019-10-07 NOTE — ASSESSMENT
[FreeTextEntry1] : Pt recovering well at home s/p OHS. Reviewed all medications and dosages with pt and pt family understanding. Pt has all medications in home and is taking as prescribed. Pain controlled with current medication regimen including Percocet. Discussed MDD of Tylenol in conjunction with Percocet, advised pt to wean to Tylenol when pain is less intense. Pt incision unremarkable, pt advised to use clean gauze or cotton under breast to prevent moisture at the incision. No new symptoms, issues or concerns to report at this time. Pt to see Dr. Arenas tomorrow, however, son would prefer to change appt as pt is still "uncomfortable" and not likely ready for trip to [facility], advised son to call office immediately to schedule follow up. Son with understanding. Pt blood sugars currently controlled, encouraged pt to check blood sugar 3 times per day, at lease 2 times per day as pt was only checking fasting blood glucose in AM which ranges from 110-140. Pt agrees. No further issues or concerns to address. \par

## 2019-10-07 NOTE — REVIEW OF SYSTEMS
[Chills] : no chills [Fever] : no fever [Feeling Tired] : feeling tired [Feeling Poorly] : not feeling poorly [Earache] : no earache [Loss Of Hearing] : no hearing loss [Nosebleeds] : no nosebleeds [Nasal Discharge] : no nasal discharge [Sore Throat] : sore throat [Hoarseness] : no hoarseness [Confused] : no confusion [Convulsions] : no convulsions [Dizziness] : dizziness [Limb Weakness] : no limb weakness [Fainting] : no fainting [Difficulty Walking] : no difficulty walking [Negative] : Psychiatric [FreeTextEntry7] : last BM yesterday [FreeTextEntry5] : Incisional pain [de-identified] : as per baseline according to pt (vertigo)

## 2019-10-07 NOTE — PHYSICAL EXAM
[Neck Appearance] : the appearance of the neck was normal [Sclera] : the sclera and conjunctiva were normal [Respiration, Rhythm And Depth] : normal respiratory rhythm and effort [Auscultation Breath Sounds / Voice Sounds] : lungs were clear to auscultation bilaterally [Heart Rate And Rhythm] : heart rate was normal and rhythm regular [Heart Sounds] : normal S1 and S2 [FreeTextEntry1] : MIDCAB incision under left breast without erythema, drainage or warmth. CT sites unremarkable, no drainage noted [Chest Visual Inspection Thoracic Asymmetry] : no chest asymmetry [1+] : left 1+ [FreeTextEntry2] : B/L LE without edema, B/L calves soft, NT [Breast Appearance] : normal in appearance [Abdomen Soft] : soft [Bowel Sounds] : normal bowel sounds [Abdomen Tenderness] : non-tender [Abnormal Walk] : normal gait [Skin Turgor] : normal skin turgor [Skin Color & Pigmentation] : normal skin color and pigmentation [] : no rash [Impaired Insight] : insight and judgment were intact [Oriented To Time, Place, And Person] : oriented to person, place, and time [Affect] : the affect was normal

## 2019-10-08 ENCOUNTER — APPOINTMENT (OUTPATIENT)
Dept: CARE COORDINATION | Facility: HOME HEALTH | Age: 59
End: 2019-10-08

## 2019-10-08 ENCOUNTER — APPOINTMENT (OUTPATIENT)
Dept: CARDIOTHORACIC SURGERY | Facility: CLINIC | Age: 59
End: 2019-10-08

## 2019-10-08 DIAGNOSIS — Z71.89 OTHER SPECIFIED COUNSELING: ICD-10-CM

## 2019-10-08 DIAGNOSIS — Z82.49 FAMILY HISTORY OF ISCHEMIC HEART DISEASE AND OTHER DISEASES OF THE CIRCULATORY SYSTEM: ICD-10-CM

## 2019-10-08 DIAGNOSIS — Z95.1 PRESENCE OF AORTOCORONARY BYPASS GRAFT: ICD-10-CM

## 2019-10-08 DIAGNOSIS — Z86.39 PERSONAL HISTORY OF OTHER ENDOCRINE, NUTRITIONAL AND METABOLIC DISEASE: ICD-10-CM

## 2019-10-08 DIAGNOSIS — Z86.79 PERSONAL HISTORY OF OTHER DISEASES OF THE CIRCULATORY SYSTEM: ICD-10-CM

## 2019-10-08 DIAGNOSIS — Z09 ENCOUNTER FOR FOLLOW-UP EXAMINATION AFTER COMPLETED TREATMENT FOR CONDITIONS OTHER THAN MALIGNANT NEOPLASM: ICD-10-CM

## 2019-10-08 DIAGNOSIS — I25.10 ATHEROSCLEROTIC HEART DISEASE OF NATIVE CORONARY ARTERY W/OUT ANGINA PECTORIS: ICD-10-CM

## 2019-10-08 DIAGNOSIS — Z87.891 PERSONAL HISTORY OF NICOTINE DEPENDENCE: ICD-10-CM

## 2019-10-08 DIAGNOSIS — I65.21 OCCLUSION AND STENOSIS OF RIGHT CAROTID ARTERY: ICD-10-CM

## 2019-10-14 ENCOUNTER — INPATIENT (INPATIENT)
Facility: HOSPITAL | Age: 59
LOS: 3 days | Discharge: HOME | DRG: 187 | End: 2019-10-18
Attending: THORACIC SURGERY (CARDIOTHORACIC VASCULAR SURGERY) | Admitting: THORACIC SURGERY (CARDIOTHORACIC VASCULAR SURGERY)
Payer: COMMERCIAL

## 2019-10-14 VITALS
OXYGEN SATURATION: 96 % | WEIGHT: 177.69 LBS | TEMPERATURE: 99 F | DIASTOLIC BLOOD PRESSURE: 76 MMHG | HEART RATE: 120 BPM | RESPIRATION RATE: 18 BRPM | HEIGHT: 67 IN | SYSTOLIC BLOOD PRESSURE: 141 MMHG

## 2019-10-14 DIAGNOSIS — Z98.890 OTHER SPECIFIED POSTPROCEDURAL STATES: Chronic | ICD-10-CM

## 2019-10-14 LAB — GLUCOSE BLDC GLUCOMTR-MCNC: 284 MG/DL — HIGH (ref 70–99)

## 2019-10-15 ENCOUNTER — APPOINTMENT (OUTPATIENT)
Dept: CARDIOTHORACIC SURGERY | Facility: CLINIC | Age: 59
End: 2019-10-15

## 2019-10-15 DIAGNOSIS — I31.3 PERICARDIAL EFFUSION (NONINFLAMMATORY): ICD-10-CM

## 2019-10-15 DIAGNOSIS — Z98.890 OTHER SPECIFIED POSTPROCEDURAL STATES: ICD-10-CM

## 2019-10-15 DIAGNOSIS — Z95.1 PRESENCE OF AORTOCORONARY BYPASS GRAFT: Chronic | ICD-10-CM

## 2019-10-15 DIAGNOSIS — J90 PLEURAL EFFUSION, NOT ELSEWHERE CLASSIFIED: ICD-10-CM

## 2019-10-15 DIAGNOSIS — I10 ESSENTIAL (PRIMARY) HYPERTENSION: ICD-10-CM

## 2019-10-15 DIAGNOSIS — I25.10 ATHEROSCLEROTIC HEART DISEASE OF NATIVE CORONARY ARTERY WITHOUT ANGINA PECTORIS: ICD-10-CM

## 2019-10-15 DIAGNOSIS — E78.00 PURE HYPERCHOLESTEROLEMIA, UNSPECIFIED: ICD-10-CM

## 2019-10-15 LAB
ALBUMIN SERPL ELPH-MCNC: 3.2 G/DL — LOW (ref 3.3–5)
ALBUMIN SERPL ELPH-MCNC: 3.3 G/DL — SIGNIFICANT CHANGE UP (ref 3.3–5)
ALP SERPL-CCNC: 79 U/L — SIGNIFICANT CHANGE UP (ref 40–120)
ALP SERPL-CCNC: 79 U/L — SIGNIFICANT CHANGE UP (ref 40–120)
ALT FLD-CCNC: 10 U/L — SIGNIFICANT CHANGE UP (ref 10–45)
ALT FLD-CCNC: 9 U/L — LOW (ref 10–45)
ANION GAP SERPL CALC-SCNC: 11 MMOL/L — SIGNIFICANT CHANGE UP (ref 5–17)
ANION GAP SERPL CALC-SCNC: 11 MMOL/L — SIGNIFICANT CHANGE UP (ref 5–17)
APTT BLD: 30.1 SEC — SIGNIFICANT CHANGE UP (ref 27.5–36.3)
APTT BLD: 32.7 SEC — SIGNIFICANT CHANGE UP (ref 27.5–36.3)
AST SERPL-CCNC: 17 U/L — SIGNIFICANT CHANGE UP (ref 10–40)
AST SERPL-CCNC: 9 U/L — LOW (ref 10–40)
BILIRUB SERPL-MCNC: 0.2 MG/DL — SIGNIFICANT CHANGE UP (ref 0.2–1.2)
BILIRUB SERPL-MCNC: 0.3 MG/DL — SIGNIFICANT CHANGE UP (ref 0.2–1.2)
BUN SERPL-MCNC: 11 MG/DL — SIGNIFICANT CHANGE UP (ref 7–23)
BUN SERPL-MCNC: 7 MG/DL — SIGNIFICANT CHANGE UP (ref 7–23)
CALCIUM SERPL-MCNC: 8.5 MG/DL — SIGNIFICANT CHANGE UP (ref 8.4–10.5)
CALCIUM SERPL-MCNC: 8.9 MG/DL — SIGNIFICANT CHANGE UP (ref 8.4–10.5)
CHLORIDE SERPL-SCNC: 102 MMOL/L — SIGNIFICANT CHANGE UP (ref 96–108)
CHLORIDE SERPL-SCNC: 104 MMOL/L — SIGNIFICANT CHANGE UP (ref 96–108)
CHOLEST SERPL-MCNC: 106 MG/DL — SIGNIFICANT CHANGE UP (ref 10–199)
CO2 SERPL-SCNC: 27 MMOL/L — SIGNIFICANT CHANGE UP (ref 22–31)
CO2 SERPL-SCNC: 27 MMOL/L — SIGNIFICANT CHANGE UP (ref 22–31)
CREAT SERPL-MCNC: 0.48 MG/DL — LOW (ref 0.5–1.3)
CREAT SERPL-MCNC: 0.74 MG/DL — SIGNIFICANT CHANGE UP (ref 0.5–1.3)
GLUCOSE BLDC GLUCOMTR-MCNC: 155 MG/DL — HIGH (ref 70–99)
GLUCOSE BLDC GLUCOMTR-MCNC: 168 MG/DL — HIGH (ref 70–99)
GLUCOSE BLDC GLUCOMTR-MCNC: 209 MG/DL — HIGH (ref 70–99)
GLUCOSE SERPL-MCNC: 180 MG/DL — HIGH (ref 70–99)
GLUCOSE SERPL-MCNC: 268 MG/DL — HIGH (ref 70–99)
GRAM STN FLD: SIGNIFICANT CHANGE UP
GRAM STN FLD: SIGNIFICANT CHANGE UP
HBA1C BLD-MCNC: 8.2 % — HIGH (ref 4–5.6)
HCT VFR BLD CALC: 27.7 % — LOW (ref 34.5–45)
HCT VFR BLD CALC: 29.2 % — LOW (ref 34.5–45)
HDLC SERPL-MCNC: 29 MG/DL — LOW
HGB BLD-MCNC: 8.4 G/DL — LOW (ref 11.5–15.5)
HGB BLD-MCNC: 8.7 G/DL — LOW (ref 11.5–15.5)
INR BLD: 1.22 — HIGH (ref 0.88–1.16)
INR BLD: 1.27 — HIGH (ref 0.88–1.16)
LACTATE SERPL-SCNC: 1.3 MMOL/L — SIGNIFICANT CHANGE UP (ref 0.5–2)
LACTATE SERPL-SCNC: 1.6 MMOL/L — SIGNIFICANT CHANGE UP (ref 0.5–2)
LIPID PNL WITH DIRECT LDL SERPL: 45 MG/DL — SIGNIFICANT CHANGE UP
MAGNESIUM SERPL-MCNC: 1.5 MG/DL — LOW (ref 1.6–2.6)
MAGNESIUM SERPL-MCNC: 1.5 MG/DL — LOW (ref 1.6–2.6)
MCHC RBC-ENTMCNC: 22.8 PG — LOW (ref 27–34)
MCHC RBC-ENTMCNC: 23 PG — LOW (ref 27–34)
MCHC RBC-ENTMCNC: 29.8 GM/DL — LOW (ref 32–36)
MCHC RBC-ENTMCNC: 30.3 GM/DL — LOW (ref 32–36)
MCV RBC AUTO: 75.9 FL — LOW (ref 80–100)
MCV RBC AUTO: 76.6 FL — LOW (ref 80–100)
NRBC # BLD: 0 /100 WBCS — SIGNIFICANT CHANGE UP (ref 0–0)
NRBC # BLD: 0 /100 WBCS — SIGNIFICANT CHANGE UP (ref 0–0)
NT-PROBNP SERPL-SCNC: 457 PG/ML — HIGH (ref 0–300)
PHOSPHATE SERPL-MCNC: 3.3 MG/DL — SIGNIFICANT CHANGE UP (ref 2.5–4.5)
PLATELET # BLD AUTO: 393 K/UL — SIGNIFICANT CHANGE UP (ref 150–400)
PLATELET # BLD AUTO: 410 K/UL — HIGH (ref 150–400)
POTASSIUM SERPL-MCNC: 3.9 MMOL/L — SIGNIFICANT CHANGE UP (ref 3.5–5.3)
POTASSIUM SERPL-MCNC: 4.3 MMOL/L — SIGNIFICANT CHANGE UP (ref 3.5–5.3)
POTASSIUM SERPL-SCNC: 3.9 MMOL/L — SIGNIFICANT CHANGE UP (ref 3.5–5.3)
POTASSIUM SERPL-SCNC: 4.3 MMOL/L — SIGNIFICANT CHANGE UP (ref 3.5–5.3)
PROT SERPL-MCNC: 6.3 G/DL — SIGNIFICANT CHANGE UP (ref 6–8.3)
PROT SERPL-MCNC: 6.5 G/DL — SIGNIFICANT CHANGE UP (ref 6–8.3)
PROTHROM AB SERPL-ACNC: 13.9 SEC — HIGH (ref 10–12.9)
PROTHROM AB SERPL-ACNC: 14.4 SEC — HIGH (ref 10–12.9)
RBC # BLD: 3.65 M/UL — LOW (ref 3.8–5.2)
RBC # BLD: 3.81 M/UL — SIGNIFICANT CHANGE UP (ref 3.8–5.2)
RBC # FLD: 14.2 % — SIGNIFICANT CHANGE UP (ref 10.3–14.5)
RBC # FLD: 14.6 % — HIGH (ref 10.3–14.5)
SODIUM SERPL-SCNC: 140 MMOL/L — SIGNIFICANT CHANGE UP (ref 135–145)
SODIUM SERPL-SCNC: 142 MMOL/L — SIGNIFICANT CHANGE UP (ref 135–145)
SPECIMEN SOURCE: SIGNIFICANT CHANGE UP
SPECIMEN SOURCE: SIGNIFICANT CHANGE UP
T4 AB SER-ACNC: 8.69 UG/DL — SIGNIFICANT CHANGE UP (ref 3.17–11.72)
TOTAL CHOLESTEROL/HDL RATIO MEASUREMENT: 3.7 RATIO — SIGNIFICANT CHANGE UP (ref 3.3–7.1)
TRIGL SERPL-MCNC: 160 MG/DL — HIGH (ref 10–149)
TSH SERPL-MCNC: 0.56 UIU/ML — SIGNIFICANT CHANGE UP (ref 0.35–4.94)
WBC # BLD: 7.72 K/UL — SIGNIFICANT CHANGE UP (ref 3.8–10.5)
WBC # BLD: 9.04 K/UL — SIGNIFICANT CHANGE UP (ref 3.8–10.5)
WBC # FLD AUTO: 7.72 K/UL — SIGNIFICANT CHANGE UP (ref 3.8–10.5)
WBC # FLD AUTO: 9.04 K/UL — SIGNIFICANT CHANGE UP (ref 3.8–10.5)

## 2019-10-15 PROCEDURE — 99291 CRITICAL CARE FIRST HOUR: CPT

## 2019-10-15 PROCEDURE — 76930: CPT | Mod: 26

## 2019-10-15 PROCEDURE — 99152 MOD SED SAME PHYS/QHP 5/>YRS: CPT

## 2019-10-15 PROCEDURE — 99292 CRITICAL CARE ADDL 30 MIN: CPT

## 2019-10-15 PROCEDURE — 71045 X-RAY EXAM CHEST 1 VIEW: CPT | Mod: 26

## 2019-10-15 PROCEDURE — 32557 INSERT CATH PLEURA W/ IMAGE: CPT | Mod: LT

## 2019-10-15 PROCEDURE — 93010 ELECTROCARDIOGRAM REPORT: CPT

## 2019-10-15 PROCEDURE — 71045 X-RAY EXAM CHEST 1 VIEW: CPT | Mod: 26,77

## 2019-10-15 PROCEDURE — 93306 TTE W/DOPPLER COMPLETE: CPT | Mod: 26

## 2019-10-15 PROCEDURE — 33010: CPT

## 2019-10-15 RX ORDER — ASCORBIC ACID 60 MG
500 TABLET,CHEWABLE ORAL DAILY
Refills: 0 | Status: DISCONTINUED | OUTPATIENT
Start: 2019-10-15 | End: 2019-10-18

## 2019-10-15 RX ORDER — SODIUM CHLORIDE 9 MG/ML
250 INJECTION, SOLUTION INTRAVENOUS ONCE
Refills: 0 | Status: COMPLETED | OUTPATIENT
Start: 2019-10-15 | End: 2019-10-15

## 2019-10-15 RX ORDER — ESCITALOPRAM OXALATE 10 MG/1
5 TABLET, FILM COATED ORAL DAILY
Refills: 0 | Status: DISCONTINUED | OUTPATIENT
Start: 2019-10-15 | End: 2019-10-18

## 2019-10-15 RX ORDER — CLOPIDOGREL BISULFATE 75 MG/1
75 TABLET, FILM COATED ORAL DAILY
Refills: 0 | Status: DISCONTINUED | OUTPATIENT
Start: 2019-10-15 | End: 2019-10-18

## 2019-10-15 RX ORDER — DEXTROSE 50 % IN WATER 50 %
15 SYRINGE (ML) INTRAVENOUS ONCE
Refills: 0 | Status: DISCONTINUED | OUTPATIENT
Start: 2019-10-15 | End: 2019-10-18

## 2019-10-15 RX ORDER — SENNA PLUS 8.6 MG/1
2 TABLET ORAL AT BEDTIME
Refills: 0 | Status: DISCONTINUED | OUTPATIENT
Start: 2019-10-15 | End: 2019-10-18

## 2019-10-15 RX ORDER — ACETAMINOPHEN 500 MG
1000 TABLET ORAL ONCE
Refills: 0 | Status: COMPLETED | OUTPATIENT
Start: 2019-10-15 | End: 2019-10-15

## 2019-10-15 RX ORDER — DEXTROSE 50 % IN WATER 50 %
25 SYRINGE (ML) INTRAVENOUS ONCE
Refills: 0 | Status: DISCONTINUED | OUTPATIENT
Start: 2019-10-15 | End: 2019-10-18

## 2019-10-15 RX ORDER — MAGNESIUM SULFATE 500 MG/ML
2 VIAL (ML) INJECTION ONCE
Refills: 0 | Status: COMPLETED | OUTPATIENT
Start: 2019-10-15 | End: 2019-10-15

## 2019-10-15 RX ORDER — ACETAMINOPHEN 500 MG
650 TABLET ORAL EVERY 6 HOURS
Refills: 0 | Status: DISCONTINUED | OUTPATIENT
Start: 2019-10-15 | End: 2019-10-18

## 2019-10-15 RX ORDER — PANTOPRAZOLE SODIUM 20 MG/1
40 TABLET, DELAYED RELEASE ORAL
Refills: 0 | Status: DISCONTINUED | OUTPATIENT
Start: 2019-10-15 | End: 2019-10-18

## 2019-10-15 RX ORDER — INSULIN GLARGINE 100 [IU]/ML
28 INJECTION, SOLUTION SUBCUTANEOUS AT BEDTIME
Refills: 0 | Status: DISCONTINUED | OUTPATIENT
Start: 2019-10-15 | End: 2019-10-18

## 2019-10-15 RX ORDER — SODIUM CHLORIDE 9 MG/ML
3 INJECTION INTRAMUSCULAR; INTRAVENOUS; SUBCUTANEOUS EVERY 8 HOURS
Refills: 0 | Status: DISCONTINUED | OUTPATIENT
Start: 2019-10-15 | End: 2019-10-18

## 2019-10-15 RX ORDER — ATORVASTATIN CALCIUM 80 MG/1
20 TABLET, FILM COATED ORAL AT BEDTIME
Refills: 0 | Status: DISCONTINUED | OUTPATIENT
Start: 2019-10-15 | End: 2019-10-18

## 2019-10-15 RX ORDER — SODIUM CHLORIDE 9 MG/ML
1000 INJECTION, SOLUTION INTRAVENOUS
Refills: 0 | Status: DISCONTINUED | OUTPATIENT
Start: 2019-10-15 | End: 2019-10-18

## 2019-10-15 RX ORDER — ALBUMIN HUMAN 25 %
250 VIAL (ML) INTRAVENOUS ONCE
Refills: 0 | Status: COMPLETED | OUTPATIENT
Start: 2019-10-15 | End: 2019-10-15

## 2019-10-15 RX ORDER — GLUCAGON INJECTION, SOLUTION 0.5 MG/.1ML
1 INJECTION, SOLUTION SUBCUTANEOUS ONCE
Refills: 0 | Status: DISCONTINUED | OUTPATIENT
Start: 2019-10-15 | End: 2019-10-18

## 2019-10-15 RX ORDER — DEXTROSE 50 % IN WATER 50 %
12.5 SYRINGE (ML) INTRAVENOUS ONCE
Refills: 0 | Status: DISCONTINUED | OUTPATIENT
Start: 2019-10-15 | End: 2019-10-18

## 2019-10-15 RX ORDER — SODIUM CHLORIDE 9 MG/ML
1000 INJECTION, SOLUTION INTRAVENOUS
Refills: 0 | Status: DISCONTINUED | OUTPATIENT
Start: 2019-10-15 | End: 2019-10-16

## 2019-10-15 RX ORDER — METOPROLOL TARTRATE 50 MG
12.5 TABLET ORAL EVERY 12 HOURS
Refills: 0 | Status: DISCONTINUED | OUTPATIENT
Start: 2019-10-15 | End: 2019-10-16

## 2019-10-15 RX ORDER — ASPIRIN/CALCIUM CARB/MAGNESIUM 324 MG
81 TABLET ORAL DAILY
Refills: 0 | Status: DISCONTINUED | OUTPATIENT
Start: 2019-10-15 | End: 2019-10-18

## 2019-10-15 RX ORDER — KETOROLAC TROMETHAMINE 30 MG/ML
15 SYRINGE (ML) INJECTION ONCE
Refills: 0 | Status: DISCONTINUED | OUTPATIENT
Start: 2019-10-15 | End: 2019-10-15

## 2019-10-15 RX ORDER — FERROUS SULFATE 325(65) MG
325 TABLET ORAL DAILY
Refills: 0 | Status: DISCONTINUED | OUTPATIENT
Start: 2019-10-15 | End: 2019-10-18

## 2019-10-15 RX ORDER — HYDROMORPHONE HYDROCHLORIDE 2 MG/ML
0.5 INJECTION INTRAMUSCULAR; INTRAVENOUS; SUBCUTANEOUS ONCE
Refills: 0 | Status: DISCONTINUED | OUTPATIENT
Start: 2019-10-15 | End: 2019-10-15

## 2019-10-15 RX ORDER — MECLIZINE HCL 12.5 MG
25 TABLET ORAL DAILY
Refills: 0 | Status: DISCONTINUED | OUTPATIENT
Start: 2019-10-15 | End: 2019-10-18

## 2019-10-15 RX ORDER — POLYETHYLENE GLYCOL 3350 17 G/17G
17 POWDER, FOR SOLUTION ORAL DAILY
Refills: 0 | Status: DISCONTINUED | OUTPATIENT
Start: 2019-10-15 | End: 2019-10-18

## 2019-10-15 RX ORDER — HEPARIN SODIUM 5000 [USP'U]/ML
5000 INJECTION INTRAVENOUS; SUBCUTANEOUS EVERY 8 HOURS
Refills: 0 | Status: DISCONTINUED | OUTPATIENT
Start: 2019-10-15 | End: 2019-10-18

## 2019-10-15 RX ORDER — DOCUSATE SODIUM 100 MG
100 CAPSULE ORAL THREE TIMES A DAY
Refills: 0 | Status: DISCONTINUED | OUTPATIENT
Start: 2019-10-15 | End: 2019-10-18

## 2019-10-15 RX ORDER — KETOROLAC TROMETHAMINE 30 MG/ML
30 SYRINGE (ML) INJECTION ONCE
Refills: 0 | Status: DISCONTINUED | OUTPATIENT
Start: 2019-10-15 | End: 2019-10-15

## 2019-10-15 RX ORDER — INSULIN LISPRO 100/ML
VIAL (ML) SUBCUTANEOUS
Refills: 0 | Status: DISCONTINUED | OUTPATIENT
Start: 2019-10-15 | End: 2019-10-18

## 2019-10-15 RX ORDER — OXYCODONE AND ACETAMINOPHEN 5; 325 MG/1; MG/1
1 TABLET ORAL EVERY 6 HOURS
Refills: 0 | Status: DISCONTINUED | OUTPATIENT
Start: 2019-10-15 | End: 2019-10-18

## 2019-10-15 RX ORDER — INSULIN LISPRO 100/ML
16 VIAL (ML) SUBCUTANEOUS
Refills: 0 | Status: DISCONTINUED | OUTPATIENT
Start: 2019-10-15 | End: 2019-10-18

## 2019-10-15 RX ADMIN — HYDROMORPHONE HYDROCHLORIDE 0.5 MILLIGRAM(S): 2 INJECTION INTRAMUSCULAR; INTRAVENOUS; SUBCUTANEOUS at 12:40

## 2019-10-15 RX ADMIN — CLOPIDOGREL BISULFATE 75 MILLIGRAM(S): 75 TABLET, FILM COATED ORAL at 14:08

## 2019-10-15 RX ADMIN — ATORVASTATIN CALCIUM 20 MILLIGRAM(S): 80 TABLET, FILM COATED ORAL at 22:25

## 2019-10-15 RX ADMIN — SODIUM CHLORIDE 500 MILLILITER(S): 9 INJECTION, SOLUTION INTRAVENOUS at 00:57

## 2019-10-15 RX ADMIN — Medication 2: at 06:21

## 2019-10-15 RX ADMIN — Medication 15 MILLIGRAM(S): at 23:30

## 2019-10-15 RX ADMIN — SODIUM CHLORIDE 3 MILLILITER(S): 9 INJECTION INTRAMUSCULAR; INTRAVENOUS; SUBCUTANEOUS at 13:56

## 2019-10-15 RX ADMIN — Medication 400 MILLIGRAM(S): at 00:57

## 2019-10-15 RX ADMIN — Medication 50 GRAM(S): at 23:15

## 2019-10-15 RX ADMIN — HEPARIN SODIUM 5000 UNIT(S): 5000 INJECTION INTRAVENOUS; SUBCUTANEOUS at 06:09

## 2019-10-15 RX ADMIN — Medication 81 MILLIGRAM(S): at 14:08

## 2019-10-15 RX ADMIN — INSULIN GLARGINE 28 UNIT(S): 100 INJECTION, SOLUTION SUBCUTANEOUS at 22:26

## 2019-10-15 RX ADMIN — Medication 125 MILLILITER(S): at 22:53

## 2019-10-15 RX ADMIN — Medication 30 MILLIGRAM(S): at 13:30

## 2019-10-15 RX ADMIN — HEPARIN SODIUM 5000 UNIT(S): 5000 INJECTION INTRAVENOUS; SUBCUTANEOUS at 14:08

## 2019-10-15 RX ADMIN — Medication 1000 MILLIGRAM(S): at 01:57

## 2019-10-15 RX ADMIN — Medication 30 MILLIGRAM(S): at 13:55

## 2019-10-15 RX ADMIN — Medication 650 MILLIGRAM(S): at 21:30

## 2019-10-15 RX ADMIN — Medication 1000 MILLIGRAM(S): at 17:29

## 2019-10-15 RX ADMIN — HEPARIN SODIUM 5000 UNIT(S): 5000 INJECTION INTRAVENOUS; SUBCUTANEOUS at 21:30

## 2019-10-15 RX ADMIN — SODIUM CHLORIDE 3 MILLILITER(S): 9 INJECTION INTRAMUSCULAR; INTRAVENOUS; SUBCUTANEOUS at 21:30

## 2019-10-15 RX ADMIN — Medication 15 MILLIGRAM(S): at 23:00

## 2019-10-15 RX ADMIN — SODIUM CHLORIDE 100 MILLILITER(S): 9 INJECTION, SOLUTION INTRAVENOUS at 06:10

## 2019-10-15 RX ADMIN — Medication 4: at 17:56

## 2019-10-15 RX ADMIN — SODIUM CHLORIDE 100 MILLILITER(S): 9 INJECTION, SOLUTION INTRAVENOUS at 02:00

## 2019-10-15 RX ADMIN — Medication: at 00:50

## 2019-10-15 RX ADMIN — HYDROMORPHONE HYDROCHLORIDE 0.5 MILLIGRAM(S): 2 INJECTION INTRAMUSCULAR; INTRAVENOUS; SUBCUTANEOUS at 13:00

## 2019-10-15 RX ADMIN — Medication 400 MILLIGRAM(S): at 16:50

## 2019-10-15 RX ADMIN — Medication 100 MILLIGRAM(S): at 22:25

## 2019-10-15 RX ADMIN — PANTOPRAZOLE SODIUM 40 MILLIGRAM(S): 20 TABLET, DELAYED RELEASE ORAL at 06:09

## 2019-10-15 RX ADMIN — SENNA PLUS 2 TABLET(S): 8.6 TABLET ORAL at 22:25

## 2019-10-15 RX ADMIN — Medication 125 MILLILITER(S): at 16:30

## 2019-10-15 RX ADMIN — Medication 12.5 MILLIGRAM(S): at 14:09

## 2019-10-15 RX ADMIN — Medication 650 MILLIGRAM(S): at 22:00

## 2019-10-15 NOTE — H&P ADULT - NSICDXPASTSURGICALHX_GEN_ALL_CORE_FT
PAST SURGICAL HISTORY:  H/O carotid endarterectomy R side - Sptember 2018    History of cardiac cath 2013 ([facility])    S/P CABG x 1

## 2019-10-15 NOTE — CHART NOTE - NSCHARTNOTEFT_GEN_A_CORE
Procedure:  US guided pericardial drain placement  US guided left chest tube placement        Clinical History: CAD  Family history of early CAD  Handoff  MEWS Score  CAD, multiple vessel  Diabetes mellitus  Hyperlipemia  Hypertension  Pure hypercholesterolemia  Hypertension, unspecified type  CAD, multiple vessel  Pleural effusion on left  Pericardial effusion  S/P CABG x 1  History of cardiac cath  H/O carotid endarterectomy      Meds:acetaminophen   Tablet .. 650 milliGRAM(s) Oral every 6 hours PRN  ascorbic acid 500 milliGRAM(s) Oral daily  aspirin enteric coated 81 milliGRAM(s) Oral daily  atorvastatin 20 milliGRAM(s) Oral at bedtime  clopidogrel Tablet 75 milliGRAM(s) Oral daily  dextrose 40% Gel 15 Gram(s) Oral once PRN  dextrose 5%. 1000 milliLiter(s) IV Continuous <Continuous>  dextrose 50% Injectable 12.5 Gram(s) IV Push once  dextrose 50% Injectable 25 Gram(s) IV Push once  dextrose 50% Injectable 25 Gram(s) IV Push once  docusate sodium 100 milliGRAM(s) Oral three times a day  ferrous    sulfate 325 milliGRAM(s) Oral daily  glucagon  Injectable 1 milliGRAM(s) IntraMuscular once PRN  heparin  Injectable 5000 Unit(s) SubCutaneous every 8 hours  insulin lispro (HumaLOG) corrective regimen sliding scale   SubCutaneous three times a day before meals  lactated ringers. 1000 milliLiter(s) IV Continuous <Continuous>  pantoprazole    Tablet 40 milliGRAM(s) Oral before breakfast  polyethylene glycol 3350 17 Gram(s) Oral daily PRN  senna 2 Tablet(s) Oral at bedtime  sodium chloride 0.9% lock flush 3 milliLiter(s) IV Push every 8 hours      Allergies:No Known Allergies        Labs:                           8.4    9.04  )-----------( 393      ( 15 Oct 2019 00:37 )             27.7     PT/INR - ( 15 Oct 2019 00:37 )   PT: 14.4 sec;   INR: 1.27          PTT - ( 15 Oct 2019 00:37 )  PTT:32.7 sec  10-15    140  |  102  |  11  ----------------------------<  268<H>  3.9   |  27  |  0.74    Ca    8.5      15 Oct 2019 00:37  Mg     1.5     10-15    TPro  6.3  /  Alb  3.3  /  TBili  0.2  /  DBili  x   /  AST  9<L>  /  ALT  9<L>  /  AlkPhos  79  10-15            NPO: Yes    Consent signed by :  PATIENT

## 2019-10-15 NOTE — H&P ADULT - ASSESSMENT
60 y/o female PMH CAD (s/p MIDCABx1 9/27/19 & PCI x 2 10/2/19 @ [facility] with Dagoberto)), DM, HTN, hypercholesterolemia who presented to [facility] ED c/o 4 days of progressively worsening fever, chills, SOB, left side chest pain and back pain. Subsequently had CT chest non contrast which showed moderate pericardial effusion, moderate L effusion with compression atelectasis, small right pleural effusion. PT empirically started on IV ABX Zosyn/vanco. PT transferred to [facility] under Dr. Arenas for evaluation and management.    Echo performed by cardiology fellow shows large pericardial effusion with some RV buckling. BP stable 140/60,

## 2019-10-15 NOTE — PROGRESS NOTE ADULT - SUBJECTIVE AND OBJECTIVE BOX
CTICU  CRITICAL  CARE  attending     Hand off received 					   Pertinent clinical, laboratory, radiographic, hemodynamic, echocardiographic, respiratory data, microbiologic data and chart were reviewed and analyzed frequently throughout the course of the day and night  Patient seen and examined with CTS/ SH attending at bedside  Pt is a 59y , Female, HEALTH ISSUES - PROBLEM Dx:  Pure hypercholesterolemia: Pure hypercholesterolemia  Hypertension, unspecified type: Hypertension, unspecified type  CAD, multiple vessel: CAD, multiple vessel  Pleural effusion on left: Pleural effusion on left  Pericardial effusion: Pericardial effusion      , FAMILY HISTORY:  Family history of early CAD: Brother s/p PCI at age 43  Sister s/p PCI at age 53 + 58  PAST MEDICAL & SURGICAL HISTORY:  CAD, multiple vessel  Diabetes mellitus  Hyperlipemia  Hypertension  S/P CABG x 1  [place] of cardiac cath: [place] [place]  H/O carotid endarterectomy: R side - Sptember 2018    Patient is a 59y old  Female who presents with a chief complaint of     14 system review was unremarkable    Vital signs, hemodynamic and respiratory parameters were reviewed from the bedside nursing flowsheet.  ICU Vital Signs Last 24 Hrs  T(C): 36.3 (15 Oct 2019 21:44), Max: 37.1 (15 Oct 2019 14:00)  T(F): 97.4 (15 Oct 2019 21:44), Max: 98.7 (15 Oct 2019 14:00)  HR: 102 (15 Oct 2019 23:00) (100 - 128)  BP: 148/78 (15 Oct 2019 23:00) (112/70 - 161/74)  BP(mean): 105 (15 Oct 2019 23:00) (85 - 115)  ABP: --  ABP(mean): --  RR: 25 (15 Oct 2019 23:00) (16 - 25)  SpO2: 100% (15 Oct 2019 23:00) (93% - 100%)    Adult Advanced Hemodynamics Last 24 Hrs  CVP(mm Hg): --  CVP(cm H2O): --  CO: --  CI: --  PA: --  PA(mean): --  PCWP: --  SVR: --  SVRI: --  PVR: --  PVRI: --,     Intake and output was reviewed and the fluid balance was calculated  Daily     Daily   I&O's Summary    14 Oct 2019 07:01  -  15 Oct 2019 07:00  --------------------------------------------------------  IN: 1050 mL / OUT: 900 mL / NET: 150 mL    15 Oct 2019 07:01  -  15 Oct 2019 23:49  --------------------------------------------------------  IN: 2030 mL / OUT: 600 mL / NET: 1430 mL        All lines and drain sites were assessed  Glycemic trend was reviewedCAPILLARY BLOOD GLUCOSE      POCT Blood Glucose.: 155 mg/dL (15 Oct 2019 21:32)    No acute change in mental status  Auscultation of the chest reveals equal bs  Abdomen is soft  Extremities are warm and well perfused  Wounds appear clean and unremarkable  Antibiotics are periop    labs  CBC Full  -  ( 15 Oct 2019 13:52 )  WBC Count : 7.72 K/uL  RBC Count : 3.81 M/uL  Hemoglobin : 8.7 g/dL  Hematocrit : 29.2 %  Platelet Count - Automated : 410 K/uL  Mean Cell Volume : 76.6 fl  Mean Cell Hemoglobin : 22.8 pg  Mean Cell Hemoglobin Concentration : 29.8 gm/dL  Auto Neutrophil # : x  Auto Lymphocyte # : x  Auto Monocyte # : x  Auto Eosinophil # : x  Auto Basophil # : x  Auto Neutrophil % : x  Auto Lymphocyte % : x  Auto Monocyte % : x  Auto Eosinophil % : x  Auto Basophil % : x    10-15    142  |  104  |  7   ----------------------------<  180<H>  4.3   |  27  |  0.48<L>    Ca    8.9      15 Oct 2019 13:52  Phos  3.3     10-15  Mg     1.5     10-15    TPro  6.5  /  Alb  3.2<L>  /  TBili  0.3  /  DBili  x   /  AST  17  /  ALT  10  /  AlkPhos  79  10-15    PT/INR - ( 15 Oct 2019 13:52 )   PT: 13.9 sec;   INR: 1.22          PTT - ( 15 Oct 2019 13:52 )  PTT:30.1 sec  The current medications were reviewed   MEDICATIONS  (STANDING):  ascorbic acid 500 milliGRAM(s) Oral daily  aspirin enteric coated 81 milliGRAM(s) Oral daily  atorvastatin 20 milliGRAM(s) Oral at bedtime  clopidogrel Tablet 75 milliGRAM(s) Oral daily  dextrose 5%. 1000 milliLiter(s) (50 mL/Hr) IV Continuous <Continuous>  dextrose 50% Injectable 12.5 Gram(s) IV Push once  dextrose 50% Injectable 25 Gram(s) IV Push once  dextrose 50% Injectable 25 Gram(s) IV Push once  docusate sodium 100 milliGRAM(s) Oral three times a day  escitalopram 5 milliGRAM(s) Oral daily  ferrous    sulfate 325 milliGRAM(s) Oral daily  heparin  Injectable 5000 Unit(s) SubCutaneous every 8 hours  insulin glargine Injectable (LANTUS) 28 Unit(s) SubCutaneous at bedtime  insulin lispro (HumaLOG) corrective regimen sliding scale   SubCutaneous three times a day before meals  insulin lispro Injectable (HumaLOG) 16 Unit(s) SubCutaneous three times a day before meals  lactated ringers. 1000 milliLiter(s) (100 mL/Hr) IV Continuous <Continuous>  metoprolol tartrate 12.5 milliGRAM(s) Oral every 12 hours  pantoprazole    Tablet 40 milliGRAM(s) Oral before breakfast  senna 2 Tablet(s) Oral at bedtime  sodium chloride 0.9% lock flush 3 milliLiter(s) IV Push every 8 hours    MEDICATIONS  (PRN):  acetaminophen   Tablet .. 650 milliGRAM(s) Oral every 6 hours PRN Mild Pain (1 - 3)  dextrose 40% Gel 15 Gram(s) Oral once PRN Blood Glucose LESS THAN 70 milliGRAM(s)/deciliter  glucagon  Injectable 1 milliGRAM(s) IntraMuscular once PRN Glucose LESS THAN 70 milligrams/deciliter  meclizine 25 milliGRAM(s) Oral daily PRN Dizziness  oxyCODONE    5 mG/acetaminophen 325 mG 1 Tablet(s) Oral every 6 hours PRN Severe Pain (7 - 10)  polyethylene glycol 3350 17 Gram(s) Oral daily PRN Constipation       PROBLEM LIST/ ASSESSMENT:  HEALTH ISSUES - PROBLEM Dx:  Pure hypercholesterolemia: Pure hypercholesterolemia  Hypertension, unspecified type: Hypertension, unspecified type  CAD, multiple vessel: CAD, multiple vessel  Pleural effusion on left: Pleural effusion on left  Pericardial effusion: Pericardial effusion      ,   Patient is a 59y old  Female who presents with a chief complaint of    s/p cardiac surgery          My plan includes :  close hemodynamic, ventilatory and drain monitoring and management per post op routine    Monitor for arrhythmias and monitor parameters for organ perfusion  beta blockade not administered due to hemodynamic instability and bradycardia  monitor neurologic status  Head of the bed should remain elevated to 45 deg .   chest PT and IS will be encouraged  monitor adequacy of oxygenation and ventilation and attempt to wean oxygen  antibiotic regimen will be tailored to the clinical, laboratory and microbiologic data  Nutritional goals will be met using po eventually , ensure adequate caloric intake and montior the same  Stress ulcer and VTE prophylaxis will be achieved    Glycemic control is satisfactory  Electrolytes have been repleted as necessary and wound care has been carried out. Pain control has been achieved.   agressive physical therapy and early mobility and ambulation goals will be met   The family was updated about the course and plan  CRITICAL CARE TIME personally provided by me  in evaluation and management, reassessments, review and interpretation of labs and x-rays, ventilator and hemodynamic management, formulating a plan and coordinating care: ___90____ MIN.  Time does not include procedural time.  CTICU ATTENDING     					    Garth Peterson MD

## 2019-10-15 NOTE — CHART NOTE - NSCHARTNOTEFT_GEN_A_CORE
PROCEDURE: US guided percutaneous drainage of left pleural effusion and moderate pericardial effusion with placement of left chest tube and pericardial drain    : Demetrius Cohen MD    ASSISTANT: Roland Spencer MD.    INDICATION: Moderate pericardial effusion, Left pleural effusion    EBL: 0cc    ANESTHESIA: local    COMPLICATIONS: none    SPECIMENS:    FINDINGS:  preprocedural ultrasound reveal anechoic fluid in left pleural space consistent with simple pleural effusion.  700cc of serosanginous fluid drained from left pleural space into Pleur-evac after placement of catheter.    GENERAL CONDITION OF PATIENT: stable    POSTOPERATIVE DIAGNOSIS: pericardial effusion and left pleural effusion    DISPOSITION: to floor PROCEDURE: US guided percutaneous drainage of left pleural effusion and moderate pericardial effusion with placement of left chest tube and pericardial drain    : Demetrius Cohen MD    ASSISTANT: Roland Spencer MD.    INDICATION: Moderate pericardial effusion, Left pleural effusion    EBL: 0cc    ANESTHESIA: local    COMPLICATIONS: none    SPECIMENS: pericardial and pleural fluid    FINDINGS:  preprocedural ultrasound reveal anechoic fluid in left pleural space consistent with simple pleural effusion.  150cc of serosanginous fluid drained from pericardial space into USHA drain.  700cc of serosanginous fluid drained from left pleural space into Pleur-evac after placement of catheter.    GENERAL CONDITION OF PATIENT: stable    POSTOPERATIVE DIAGNOSIS: pericardial effusion and left pleural effusion    DISPOSITION: to ICU

## 2019-10-15 NOTE — PROGRESS NOTE ADULT - SUBJECTIVE AND OBJECTIVE BOX
Patient discussed on morning rounds with Dr. Arenas     Operation / Date: 9/27 MIDCAB, PCI 10/2- readmitted for early signs of cardiac tamponade on bedside Echo     SUBJECTIVE ASSESSMENT:  59y Female         Vital Signs Last 24 Hrs  T(C): 36.4 (15 Oct 2019 05:15), Max: 37.3 (14 Oct 2019 23:30)  T(F): 97.5 (15 Oct 2019 05:15), Max: 99.1 (14 Oct 2019 23:30)  HR: 112 (15 Oct 2019 07:48) (108 - 120)  BP: 148/79 (15 Oct 2019 07:48) (138/78 - 161/74)  BP(mean): 108 (15 Oct 2019 07:48) (94 - 108)  RR: 18 (15 Oct 2019 07:48) (16 - 18)  SpO2: 98% (15 Oct 2019 07:48) (95% - 99%)  I&O's Detail    14 Oct 2019 07:01  -  15 Oct 2019 07:00  --------------------------------------------------------  IN:    IV PiggyBack: 100 mL    Lactated Ringers IV Bolus: 250 mL    lactated ringers.: 700 mL  Total IN: 1050 mL    OUT:    Voided: 900 mL  Total OUT: 900 mL    Total NET: 150 mL      15 Oct 2019 07:01  -  15 Oct 2019 08:14  --------------------------------------------------------  IN:    lactated ringers.: 100 mL  Total IN: 100 mL    OUT:  Total OUT: 0 mL    Total NET: 100 m  CHEST TUBE:  No.   JOHNSON DRAIN:  No.  EPICARDIAL WIRES: No.  TIE DOWNS: No.  MESSINA: No.    PHYSICAL EXAM:    General:     Neurological:    Cardiovascular:    Respiratory:    Gastrointestinal:    Extremities:    Vascular:    Incision Sites:    LABS:                        8.4    9.04  )-----------( 393      ( 15 Oct 2019 00:37 )             27.7       COUMADIN:  Yes/No. REASON: .    PT/INR - ( 15 Oct 2019 00:37 )   PT: 14.4 sec;   INR: 1.27          PTT - ( 15 Oct 2019 00:37 )  PTT:32.7 sec    10-15    140  |  102  |  11  ----------------------------<  268<H>  3.9   |  27  |  0.74    Ca    8.5      15 Oct 2019 00:37  Mg     1.5     10-15    TPro  6.3  /  Alb  3.3  /  TBili  0.2  /  DBili  x   /  AST  9<L>  /  ALT  9<L>  /  AlkPhos  79  10-15      MEDICATIONS  (STANDING):  aspirin enteric coated 81 milliGRAM(s) Oral daily  atorvastatin 20 milliGRAM(s) Oral at bedtime  clopidogrel Tablet 75 milliGRAM(s) Oral daily  dextrose 5%. 1000 milliLiter(s) (50 mL/Hr) IV Continuous <Continuous>  dextrose 50% Injectable 12.5 Gram(s) IV Push once  dextrose 50% Injectable 25 Gram(s) IV Push once  dextrose 50% Injectable 25 Gram(s) IV Push once  heparin  Injectable 5000 Unit(s) SubCutaneous every 8 hours  insulin lispro (HumaLOG) corrective regimen sliding scale   SubCutaneous three times a day before meals  lactated ringers. 1000 milliLiter(s) (100 mL/Hr) IV Continuous <Continuous>  pantoprazole    Tablet 40 milliGRAM(s) Oral before breakfast  sodium chloride 0.9% lock flush 3 milliLiter(s) IV Push every 8 hours    MEDICATIONS  (PRN):  dextrose 40% Gel 15 Gram(s) Oral once PRN Blood Glucose LESS THAN 70 milliGRAM(s)/deciliter  glucagon  Injectable 1 milliGRAM(s) IntraMuscular once PRN Glucose LESS THAN 70 milligrams/deciliter        RADIOLOGY & ADDITIONAL TESTS:  CXR: < from: Xray Chest 1 View-PORTABLE IMMEDIATE (10.15.19 @ 01:41) >  IMPRESSION:   Mild interval increase in a now moderate left pleural effusion. An   underlying consolidation cannot be excluded.    < end of copied text > Patient discussed on morning rounds with Dr. Arenas     Operation / Date: 9/27 MIDCAB, PCI 10/2- readmitted for early signs of cardiac tamponade on bedside Echo     SUBJECTIVE ASSESSMENT:  59y Female seen and examined at the bedside.     Vital Signs Last 24 Hrs  T(C): 36.4 (15 Oct 2019 05:15), Max: 37.3 (14 Oct 2019 23:30)  T(F): 97.5 (15 Oct 2019 05:15), Max: 99.1 (14 Oct 2019 23:30)  HR: 112 (15 Oct 2019 07:48) (108 - 120)  BP: 148/79 (15 Oct 2019 07:48) (138/78 - 161/74)  BP(mean): 108 (15 Oct 2019 07:48) (94 - 108)  RR: 18 (15 Oct 2019 07:48) (16 - 18)  SpO2: 98% (15 Oct 2019 07:48) (95% - 99%)  I&O's Detail    14 Oct 2019 07:01  -  15 Oct 2019 07:00  --------------------------------------------------------  IN:    IV PiggyBack: 100 mL    Lactated Ringers IV Bolus: 250 mL    lactated ringers.: 700 mL  Total IN: 1050 mL    OUT:    Voided: 900 mL  Total OUT: 900 mL    Total NET: 150 mL      15 Oct 2019 07:01  -  15 Oct 2019 08:14  --------------------------------------------------------  IN:    lactated ringers.: 100 mL  Total IN: 100 mL    OUT:  Total OUT: 0 mL    Total NET: 100 m  CHEST TUBE:  No.   JOHNSON DRAIN:  No.  EPICARDIAL WIRES: No.  TIE DOWNS: No.  MESSINA: No.    PHYSICAL EXAM:    General:     Neurological:    Cardiovascular:    Respiratory:    Gastrointestinal:    Extremities:    Vascular:    Incision Sites:    LABS:                        8.4    9.04  )-----------( 393      ( 15 Oct 2019 00:37 )             27.7       COUMADIN:  No.    PT/INR - ( 15 Oct 2019 00:37 )   PT: 14.4 sec;   INR: 1.27      PTT - ( 15 Oct 2019 00:37 )  PTT:32.7 sec    10-15    140  |  102  |  11  ----------------------------<  268<H>  3.9   |  27  |  0.74    Ca    8.5      15 Oct 2019 00:37  Mg     1.5     10-15    TPro  6.3  /  Alb  3.3  /  TBili  0.2  /  DBili  x   /  AST  9<L>  /  ALT  9<L>  /  AlkPhos  79  10-15      MEDICATIONS  (STANDING):  aspirin enteric coated 81 milliGRAM(s) Oral daily  atorvastatin 20 milliGRAM(s) Oral at bedtime  clopidogrel Tablet 75 milliGRAM(s) Oral daily  dextrose 5%. 1000 milliLiter(s) (50 mL/Hr) IV Continuous <Continuous>  dextrose 50% Injectable 12.5 Gram(s) IV Push once  dextrose 50% Injectable 25 Gram(s) IV Push once  dextrose 50% Injectable 25 Gram(s) IV Push once  heparin  Injectable 5000 Unit(s) SubCutaneous every 8 hours  insulin lispro (HumaLOG) corrective regimen sliding scale   SubCutaneous three times a day before meals  lactated ringers. 1000 milliLiter(s) (100 mL/Hr) IV Continuous <Continuous>  pantoprazole    Tablet 40 milliGRAM(s) Oral before breakfast  sodium chloride 0.9% lock flush 3 milliLiter(s) IV Push every 8 hours    MEDICATIONS  (PRN):  dextrose 40% Gel 15 Gram(s) Oral once PRN Blood Glucose LESS THAN 70 milliGRAM(s)/deciliter  glucagon  Injectable 1 milliGRAM(s) IntraMuscular once PRN Glucose LESS THAN 70 milligrams/deciliter        RADIOLOGY & ADDITIONAL TESTS:  CXR: < from: Xray Chest 1 View-PORTABLE IMMEDIATE (10.15.19 @ 01:41) >  IMPRESSION:   Mild interval increase in a now moderate left pleural effusion. An   underlying consolidation cannot be excluded.    < end of copied text > Patient discussed on morning rounds with Dr. Arenas     Operation / Date: 9/27 MIDCAB, PCI 10/2- readmitted for early signs of cardiac tamponade on bedside Echo     SUBJECTIVE ASSESSMENT:  59y Female seen and examined at the bedside. Pt feeling better than yesterday. Pt denies CP or SOB, on HFNC this AM.     Vital Signs Last 24 Hrs  T(C): 36.4 (15 Oct 2019 05:15), Max: 37.3 (14 Oct 2019 23:30)  T(F): 97.5 (15 Oct 2019 05:15), Max: 99.1 (14 Oct 2019 23:30)  HR: 112 (15 Oct 2019 07:48) (108 - 120)  BP: 148/79 (15 Oct 2019 07:48) (138/78 - 161/74)  BP(mean): 108 (15 Oct 2019 07:48) (94 - 108)  RR: 18 (15 Oct 2019 07:48) (16 - 18)  SpO2: 98% (15 Oct 2019 07:48) (95% - 99%)  I&O's Detail    14 Oct 2019 07:01  -  15 Oct 2019 07:00  --------------------------------------------------------  IN:    IV PiggyBack: 100 mL    Lactated Ringers IV Bolus: 250 mL    lactated ringers.: 700 mL  Total IN: 1050 mL    OUT:    Voided: 900 mL  Total OUT: 900 mL    Total NET: 150 mL      15 Oct 2019 07:01  -  15 Oct 2019 08:14  --------------------------------------------------------  IN:    lactated ringers.: 100 mL  Total IN: 100 mL    OUT:  Total OUT: 0 mL    Total NET: 100 m  CHEST TUBE:  No.   JOHNSON DRAIN:  No.  EPICARDIAL WIRES: No.  TIE DOWNS: No.  MESSINA: No.    PHYSICAL EXAM:    General:  NAD, sitting upright on bed     Neurological: moving all extremities, following verbal commands appropriately     Cardiovascular: Tachycardic on exam, no m/r/g    Respiratory: Decreased breath sounds to LLL, all other lung fields CTA     Gastrointestinal: +bowel sounds, NT-ND, soft     Extremities: warm and well perfused, trace edema at the ankles b/l     Vascular: +DP b/l     Incision Sites: left thoracotomy CDI, no erythema, drainage or ecchymosis    LABS:                        8.4    9.04  )-----------( 393      ( 15 Oct 2019 00:37 )             27.7       COUMADIN:  No.    PT/INR - ( 15 Oct 2019 00:37 )   PT: 14.4 sec;   INR: 1.27      PTT - ( 15 Oct 2019 00:37 )  PTT:32.7 sec    10-15    140  |  102  |  11  ----------------------------<  268<H>  3.9   |  27  |  0.74    Ca    8.5      15 Oct 2019 00:37  Mg     1.5     10-15    TPro  6.3  /  Alb  3.3  /  TBili  0.2  /  DBili  x   /  AST  9<L>  /  ALT  9<L>  /  AlkPhos  79  10-15      MEDICATIONS  (STANDING):  aspirin enteric coated 81 milliGRAM(s) Oral daily  atorvastatin 20 milliGRAM(s) Oral at bedtime  clopidogrel Tablet 75 milliGRAM(s) Oral daily  dextrose 5%. 1000 milliLiter(s) (50 mL/Hr) IV Continuous <Continuous>  dextrose 50% Injectable 12.5 Gram(s) IV Push once  dextrose 50% Injectable 25 Gram(s) IV Push once  dextrose 50% Injectable 25 Gram(s) IV Push once  heparin  Injectable 5000 Unit(s) SubCutaneous every 8 hours  insulin lispro (HumaLOG) corrective regimen sliding scale   SubCutaneous three times a day before meals  lactated ringers. 1000 milliLiter(s) (100 mL/Hr) IV Continuous <Continuous>  pantoprazole    Tablet 40 milliGRAM(s) Oral before breakfast  sodium chloride 0.9% lock flush 3 milliLiter(s) IV Push every 8 hours    MEDICATIONS  (PRN):  dextrose 40% Gel 15 Gram(s) Oral once PRN Blood Glucose LESS THAN 70 milliGRAM(s)/deciliter  glucagon  Injectable 1 milliGRAM(s) IntraMuscular once PRN Glucose LESS THAN 70 milligrams/deciliter        RADIOLOGY & ADDITIONAL TESTS:  CXR: < from: Xray Chest 1 View-PORTABLE IMMEDIATE (10.15.19 @ 01:41) >  IMPRESSION:   Mild interval increase in a now moderate left pleural effusion. An   underlying consolidation cannot be excluded.

## 2019-10-15 NOTE — PROGRESS NOTE ADULT - ASSESSMENT
A/P: 60 y/o [facility] CAD (s/p MIDCABx1 9/27/19 & PCI x 2 10/2/19 @ [facility] with Dagoberto)), DM, HTN, hypercholesterolemia who presented to [facility] ED c/o 4 days of progressively worsening fever, chills, SOB, left side chest pain and back pain. Subsequently had CT chest non contrast which showed moderate pericardial effusion, moderate L effusion with compression atelectasis, small right pleural effusion. PT empirically started on IV ABX Zosyn/vanco. PT transferred to [facility] under Dr. Arenas for evaluation and management. Bedside echo revealing larger pericardial effusion and early signs of tamponade. Plan for IR today for pericardial drain placement and possible pleural pigtail catheter placement.    Neurovascular: No delirium. Pain well controlled with current regimen.  -PRN tylenol     Cardiovascular: Hemodynamically stable. HR controlled. s/p hybride revascularization   -BP. HR. EKG. TTE. Cardiac Panel. Lipid Panel. BNP.   -ASA. Plavix. BBlocker. Statin.      Respiratory: 02 Sat = 98% on RA.  -If on oxygen wean to RA from for O2 Sat > 93%.  -Encourage C+DB and Use of IS 10x / hr while awake.  -CXR.    GI: Stable.  -NPO after MN.  -PPX.  -PO Diet.    Renal / :  -Monitor renal function.  -Monitor I/O's.    Endocrine:    -A1c.  -TSH.    Hematologic:  -CBC.  -Coagulation Panel.    ID:  -Tempature.  -CBC.  -Observe for SIRS/Sepsis Syndrome.    Prophylaxis:  -DVT prophylaxis with 5000 SubQ Heparin q8h.  -SCD's    Disposition:  -ICU for frequent monitoring. A/P: 58 y/o [facility] CAD (s/p MIDCABx1 9/27/19 & PCI x 2 10/2/19 @ [facility] with Dagoberto)), DM, HTN, hypercholesterolemia who presented to [facility] ED c/o 4 days of progressively worsening fever, chills, SOB, left side chest pain and back pain. Subsequently had CT chest non contrast which showed moderate pericardial effusion, moderate L effusion with compression atelectasis, small right pleural effusion. PT empirically started on IV ABX Zosyn/vanco. PT transferred to [facility] under Dr. Arenas for evaluation and management. Bedside echo revealing larger pericardial effusion and early signs of tamponade. Plan for IR today for pericardial drain placement and possible pleural pigtail catheter placement.    Neurovascular: No delirium. Pain well controlled with current regimen.  -PRN tylenol     Cardiovascular: Hemodynamically stable. HR controlled. s/p hybride revascularization readmitted for early cardiac tamponade   - continue to monitr BP/HR/tele-  this AM   -holding BP meds  -Plan for pericardial drain placement with IR this AM   -continue ASA/Plavix, lipitor     Respiratory: 02 Sat = 98% on HFNC 40% FiO2.  -wean oxygen as tolerated   -Encourage C+DB and Use of IS 10x / hr while awake.  -f/u tomorrow AM   -IR to assess for possible pleural effusions, if +pleural effusion IR to place pigtail catheters vs thorascentesis    GI: Stable.  -NPO for IR today   -PPX- protonix  -started bowel regimen     Renal / :  -Monitor renal function.  -Monitor I/O's.    Endocrine:  +DM  -A1c- 8.2, endocrine team following on last admission 10/3- continue D/C regimen Lantus 28U QHS and Humalog 16UTID when starting PO diet   -TSH-0.55    Hematologic: H/H 8.4/27.7  -continue to trend H/H, Coags stable  -started Fe and Vitamin C    ID: no acute issues   -Temp- afebrile   -CBC- WBC 9  -Observe for SIRS/Sepsis Syndrome.    Prophylaxis:  -DVT prophylaxis with 5000 SubQ Heparin q8h.  -SCD's    Disposition:  -IR for pericardial drain placement

## 2019-10-15 NOTE — H&P ADULT - NSHPREVIEWOFSYSTEMS_GEN_ALL_CORE
Review of Systems  CONSTITUTIONAL:  Denies Fevers / chills, sweats, fatigue, weight loss, weight gain                                      NEURO:  Denies paresthesias, seizures, syncope, confusion                                                                                EYES:  Denies Blurry vision, discharge, pain, loss of vision                                                                                    ENMT:  Denies Difficulty hearing, vertigo, dysphagia, epistaxis, recent dental work                                       CV:   Chest pain, palpitations                                                                                         RESPIRATORY:  SOB                                                              GI:  Denies Nausea, vomiting, diarrhea, constipation, melena, difficulty swallowing                                               : Denies Hematuria, dysuria, urgency, incontinence                                                                                         MUSCULOSKELETAL:  Denies arthritis, joint swelling, muscle weakness                                                             SKIN/BREAST:  Denies rash, itching, hair loss, masses                                                                                            PSYCH:  Denies depression, anxiety, suicidal ideation                                                                                               HEME/LYMPH:  Denies bruises easily, enlarged lymph nodes, tender lymph nodes                                        ENDOCRINE:  Denies cold intolerance, heat intolerance, polydipsia

## 2019-10-15 NOTE — H&P ADULT - NSHPPHYSICALEXAM_GEN_ALL_CORE
Physical Exam  CONSTITUTIONAL:                                                              WNL  NEURO:                                                                       WNL                      EYES:                                                                                WNL  ENMT:                                                                               WNL  CV:                                                                                   WNL  RESPIRATORY:                                                                 rhonchi bilateral; clinically SOB  GI:                                                                                     WNL  : MESSINA + / -                                                                  WNL  MUSCULOSKELETAL:                                                       WNL  SKIN / BREAST:                                                                  WNL    Vital Signs Last 24 Hrs  T(C): 36.7 (15 Oct 2019 01:00), Max: 37.3 (14 Oct 2019 23:30)  T(F): 98 (15 Oct 2019 01:00), Max: 99.1 (14 Oct 2019 23:30)  HR: 120 (14 Oct 2019 23:30) (120 - 120)  BP: 141/76 (14 Oct 2019 23:30) (141/76 - 141/76)  BP(mean): 94 (14 Oct 2019 23:30) (94 - 94)  RR: 18 (14 Oct 2019 23:30) (18 - 18)  SpO2: 96% (14 Oct 2019 23:30) (96% - 96%)

## 2019-10-15 NOTE — H&P ADULT - HISTORY OF PRESENT ILLNESS
58 y/o female PMH CAD (s/p MIDCABx1 9/27/19 & PCI x 2 10/2/19 @ [facility] with Dagoberto)), DM, HTN, hypercholesterolemia who presented to [facility] ED c/o 4 days of progressively worsening fever, chills, SOB, left side chest pain and back pain. Subsequently had CT chest non contrast which showed moderate pericardial effusion, moderate L effusion with compression atelectasis, small right pleural effusion. PT empirically started on IV ABX Zosyn/vanco. PT transferred to [facility] under Dr. Arenas for evaluation and management.

## 2019-10-16 LAB
ALBUMIN SERPL ELPH-MCNC: 3.1 G/DL — LOW (ref 3.3–5)
ALBUMIN SERPL ELPH-MCNC: 3.3 G/DL — SIGNIFICANT CHANGE UP (ref 3.3–5)
ALP SERPL-CCNC: 71 U/L — SIGNIFICANT CHANGE UP (ref 40–120)
ALP SERPL-CCNC: 80 U/L — SIGNIFICANT CHANGE UP (ref 40–120)
ALT FLD-CCNC: 10 U/L — SIGNIFICANT CHANGE UP (ref 10–45)
ALT FLD-CCNC: 9 U/L — LOW (ref 10–45)
ANION GAP SERPL CALC-SCNC: 11 MMOL/L — SIGNIFICANT CHANGE UP (ref 5–17)
ANION GAP SERPL CALC-SCNC: 12 MMOL/L — SIGNIFICANT CHANGE UP (ref 5–17)
APTT BLD: 30.9 SEC — SIGNIFICANT CHANGE UP (ref 27.5–36.3)
APTT BLD: 31 SEC — SIGNIFICANT CHANGE UP (ref 27.5–36.3)
AST SERPL-CCNC: 10 U/L — SIGNIFICANT CHANGE UP (ref 10–40)
AST SERPL-CCNC: 13 U/L — SIGNIFICANT CHANGE UP (ref 10–40)
BILIRUB SERPL-MCNC: 0.2 MG/DL — SIGNIFICANT CHANGE UP (ref 0.2–1.2)
BILIRUB SERPL-MCNC: 0.2 MG/DL — SIGNIFICANT CHANGE UP (ref 0.2–1.2)
BUN SERPL-MCNC: 8 MG/DL — SIGNIFICANT CHANGE UP (ref 7–23)
BUN SERPL-MCNC: 9 MG/DL — SIGNIFICANT CHANGE UP (ref 7–23)
CALCIUM SERPL-MCNC: 8.5 MG/DL — SIGNIFICANT CHANGE UP (ref 8.4–10.5)
CALCIUM SERPL-MCNC: 9 MG/DL — SIGNIFICANT CHANGE UP (ref 8.4–10.5)
CHLORIDE SERPL-SCNC: 103 MMOL/L — SIGNIFICANT CHANGE UP (ref 96–108)
CHLORIDE SERPL-SCNC: 104 MMOL/L — SIGNIFICANT CHANGE UP (ref 96–108)
CO2 SERPL-SCNC: 25 MMOL/L — SIGNIFICANT CHANGE UP (ref 22–31)
CO2 SERPL-SCNC: 27 MMOL/L — SIGNIFICANT CHANGE UP (ref 22–31)
CREAT SERPL-MCNC: 0.54 MG/DL — SIGNIFICANT CHANGE UP (ref 0.5–1.3)
CREAT SERPL-MCNC: 0.56 MG/DL — SIGNIFICANT CHANGE UP (ref 0.5–1.3)
GLUCOSE BLDC GLUCOMTR-MCNC: 157 MG/DL — HIGH (ref 70–99)
GLUCOSE BLDC GLUCOMTR-MCNC: 175 MG/DL — HIGH (ref 70–99)
GLUCOSE BLDC GLUCOMTR-MCNC: 179 MG/DL — HIGH (ref 70–99)
GLUCOSE BLDC GLUCOMTR-MCNC: 274 MG/DL — HIGH (ref 70–99)
GLUCOSE SERPL-MCNC: 174 MG/DL — HIGH (ref 70–99)
GLUCOSE SERPL-MCNC: 180 MG/DL — HIGH (ref 70–99)
HCT VFR BLD CALC: 26.2 % — LOW (ref 34.5–45)
HCT VFR BLD CALC: 30.5 % — LOW (ref 34.5–45)
HGB BLD-MCNC: 7.7 G/DL — LOW (ref 11.5–15.5)
HGB BLD-MCNC: 8.9 G/DL — LOW (ref 11.5–15.5)
INR BLD: 1.16 — SIGNIFICANT CHANGE UP (ref 0.88–1.16)
INR BLD: 1.18 — HIGH (ref 0.88–1.16)
LACTATE SERPL-SCNC: 2 MMOL/L — SIGNIFICANT CHANGE UP (ref 0.5–2)
MAGNESIUM SERPL-MCNC: 2 MG/DL — SIGNIFICANT CHANGE UP (ref 1.6–2.6)
MAGNESIUM SERPL-MCNC: 2.2 MG/DL — SIGNIFICANT CHANGE UP (ref 1.6–2.6)
MCHC RBC-ENTMCNC: 22.5 PG — LOW (ref 27–34)
MCHC RBC-ENTMCNC: 22.7 PG — LOW (ref 27–34)
MCHC RBC-ENTMCNC: 29.2 GM/DL — LOW (ref 32–36)
MCHC RBC-ENTMCNC: 29.4 GM/DL — LOW (ref 32–36)
MCV RBC AUTO: 76.6 FL — LOW (ref 80–100)
MCV RBC AUTO: 77.8 FL — LOW (ref 80–100)
NRBC # BLD: 0 /100 WBCS — SIGNIFICANT CHANGE UP (ref 0–0)
NRBC # BLD: 0 /100 WBCS — SIGNIFICANT CHANGE UP (ref 0–0)
PHOSPHATE SERPL-MCNC: 2.7 MG/DL — SIGNIFICANT CHANGE UP (ref 2.5–4.5)
PHOSPHATE SERPL-MCNC: 3.3 MG/DL — SIGNIFICANT CHANGE UP (ref 2.5–4.5)
PLATELET # BLD AUTO: 365 K/UL — SIGNIFICANT CHANGE UP (ref 150–400)
PLATELET # BLD AUTO: 421 K/UL — HIGH (ref 150–400)
POTASSIUM SERPL-MCNC: 3.9 MMOL/L — SIGNIFICANT CHANGE UP (ref 3.5–5.3)
POTASSIUM SERPL-MCNC: 4.5 MMOL/L — SIGNIFICANT CHANGE UP (ref 3.5–5.3)
POTASSIUM SERPL-SCNC: 3.9 MMOL/L — SIGNIFICANT CHANGE UP (ref 3.5–5.3)
POTASSIUM SERPL-SCNC: 4.5 MMOL/L — SIGNIFICANT CHANGE UP (ref 3.5–5.3)
PROT SERPL-MCNC: 6.3 G/DL — SIGNIFICANT CHANGE UP (ref 6–8.3)
PROT SERPL-MCNC: 7 G/DL — SIGNIFICANT CHANGE UP (ref 6–8.3)
PROTHROM AB SERPL-ACNC: 13.2 SEC — HIGH (ref 10–12.9)
PROTHROM AB SERPL-ACNC: 13.4 SEC — HIGH (ref 10–12.9)
RBC # BLD: 3.42 M/UL — LOW (ref 3.8–5.2)
RBC # BLD: 3.92 M/UL — SIGNIFICANT CHANGE UP (ref 3.8–5.2)
RBC # FLD: 14.4 % — SIGNIFICANT CHANGE UP (ref 10.3–14.5)
RBC # FLD: 14.6 % — HIGH (ref 10.3–14.5)
SODIUM SERPL-SCNC: 141 MMOL/L — SIGNIFICANT CHANGE UP (ref 135–145)
SODIUM SERPL-SCNC: 141 MMOL/L — SIGNIFICANT CHANGE UP (ref 135–145)
WBC # BLD: 6.18 K/UL — SIGNIFICANT CHANGE UP (ref 3.8–10.5)
WBC # BLD: 6.82 K/UL — SIGNIFICANT CHANGE UP (ref 3.8–10.5)
WBC # FLD AUTO: 6.18 K/UL — SIGNIFICANT CHANGE UP (ref 3.8–10.5)
WBC # FLD AUTO: 6.82 K/UL — SIGNIFICANT CHANGE UP (ref 3.8–10.5)

## 2019-10-16 PROCEDURE — 93308 TTE F-UP OR LMTD: CPT | Mod: 26

## 2019-10-16 PROCEDURE — 99233 SBSQ HOSP IP/OBS HIGH 50: CPT

## 2019-10-16 PROCEDURE — 71045 X-RAY EXAM CHEST 1 VIEW: CPT | Mod: 26

## 2019-10-16 RX ORDER — FUROSEMIDE 40 MG
20 TABLET ORAL ONCE
Refills: 0 | Status: COMPLETED | OUTPATIENT
Start: 2019-10-16 | End: 2019-10-16

## 2019-10-16 RX ORDER — METOPROLOL TARTRATE 50 MG
25 TABLET ORAL EVERY 6 HOURS
Refills: 0 | Status: DISCONTINUED | OUTPATIENT
Start: 2019-10-16 | End: 2019-10-17

## 2019-10-16 RX ORDER — POTASSIUM CHLORIDE 20 MEQ
20 PACKET (EA) ORAL ONCE
Refills: 0 | Status: COMPLETED | OUTPATIENT
Start: 2019-10-16 | End: 2019-10-16

## 2019-10-16 RX ORDER — IBUPROFEN 200 MG
400 TABLET ORAL EVERY 6 HOURS
Refills: 0 | Status: DISCONTINUED | OUTPATIENT
Start: 2019-10-16 | End: 2019-10-18

## 2019-10-16 RX ADMIN — Medication 400 MILLIGRAM(S): at 17:13

## 2019-10-16 RX ADMIN — Medication 325 MILLIGRAM(S): at 12:36

## 2019-10-16 RX ADMIN — HEPARIN SODIUM 5000 UNIT(S): 5000 INJECTION INTRAVENOUS; SUBCUTANEOUS at 14:15

## 2019-10-16 RX ADMIN — Medication 2: at 17:15

## 2019-10-16 RX ADMIN — Medication 16 UNIT(S): at 12:40

## 2019-10-16 RX ADMIN — PANTOPRAZOLE SODIUM 40 MILLIGRAM(S): 20 TABLET, DELAYED RELEASE ORAL at 06:11

## 2019-10-16 RX ADMIN — Medication 2: at 07:30

## 2019-10-16 RX ADMIN — SODIUM CHLORIDE 3 MILLILITER(S): 9 INJECTION INTRAMUSCULAR; INTRAVENOUS; SUBCUTANEOUS at 22:12

## 2019-10-16 RX ADMIN — Medication 2: at 12:30

## 2019-10-16 RX ADMIN — Medication 650 MILLIGRAM(S): at 09:26

## 2019-10-16 RX ADMIN — Medication 400 MILLIGRAM(S): at 10:21

## 2019-10-16 RX ADMIN — Medication 100 MILLIGRAM(S): at 22:11

## 2019-10-16 RX ADMIN — Medication 20 MILLIGRAM(S): at 10:22

## 2019-10-16 RX ADMIN — HEPARIN SODIUM 5000 UNIT(S): 5000 INJECTION INTRAVENOUS; SUBCUTANEOUS at 22:10

## 2019-10-16 RX ADMIN — Medication 500 MILLIGRAM(S): at 12:36

## 2019-10-16 RX ADMIN — Medication 650 MILLIGRAM(S): at 10:23

## 2019-10-16 RX ADMIN — Medication 25 MILLIGRAM(S): at 13:53

## 2019-10-16 RX ADMIN — Medication 100 MILLIGRAM(S): at 06:12

## 2019-10-16 RX ADMIN — Medication 400 MILLIGRAM(S): at 23:00

## 2019-10-16 RX ADMIN — OXYCODONE AND ACETAMINOPHEN 1 TABLET(S): 5; 325 TABLET ORAL at 12:37

## 2019-10-16 RX ADMIN — CLOPIDOGREL BISULFATE 75 MILLIGRAM(S): 75 TABLET, FILM COATED ORAL at 12:36

## 2019-10-16 RX ADMIN — SODIUM CHLORIDE 3 MILLILITER(S): 9 INJECTION INTRAMUSCULAR; INTRAVENOUS; SUBCUTANEOUS at 13:54

## 2019-10-16 RX ADMIN — ATORVASTATIN CALCIUM 20 MILLIGRAM(S): 80 TABLET, FILM COATED ORAL at 22:10

## 2019-10-16 RX ADMIN — OXYCODONE AND ACETAMINOPHEN 1 TABLET(S): 5; 325 TABLET ORAL at 14:15

## 2019-10-16 RX ADMIN — Medication 400 MILLIGRAM(S): at 11:12

## 2019-10-16 RX ADMIN — Medication 16 UNIT(S): at 07:30

## 2019-10-16 RX ADMIN — INSULIN GLARGINE 28 UNIT(S): 100 INJECTION, SOLUTION SUBCUTANEOUS at 22:41

## 2019-10-16 RX ADMIN — Medication 25 MILLIGRAM(S): at 17:13

## 2019-10-16 RX ADMIN — Medication 16 UNIT(S): at 17:15

## 2019-10-16 RX ADMIN — Medication 400 MILLIGRAM(S): at 22:10

## 2019-10-16 RX ADMIN — Medication 24 MILLIGRAM(S): at 09:26

## 2019-10-16 RX ADMIN — OXYCODONE AND ACETAMINOPHEN 1 TABLET(S): 5; 325 TABLET ORAL at 06:30

## 2019-10-16 RX ADMIN — Medication 81 MILLIGRAM(S): at 12:36

## 2019-10-16 RX ADMIN — Medication 400 MILLIGRAM(S): at 18:34

## 2019-10-16 RX ADMIN — Medication 20 MILLIEQUIVALENT(S): at 06:11

## 2019-10-16 RX ADMIN — Medication 6: at 22:12

## 2019-10-16 RX ADMIN — HEPARIN SODIUM 5000 UNIT(S): 5000 INJECTION INTRAVENOUS; SUBCUTANEOUS at 06:00

## 2019-10-16 RX ADMIN — OXYCODONE AND ACETAMINOPHEN 1 TABLET(S): 5; 325 TABLET ORAL at 05:30

## 2019-10-16 RX ADMIN — Medication 12.5 MILLIGRAM(S): at 02:15

## 2019-10-16 RX ADMIN — Medication 12.5 MILLIGRAM(S): at 06:11

## 2019-10-16 RX ADMIN — SODIUM CHLORIDE 3 MILLILITER(S): 9 INJECTION INTRAMUSCULAR; INTRAVENOUS; SUBCUTANEOUS at 05:15

## 2019-10-16 RX ADMIN — ESCITALOPRAM OXALATE 5 MILLIGRAM(S): 10 TABLET, FILM COATED ORAL at 12:36

## 2019-10-16 RX ADMIN — SENNA PLUS 2 TABLET(S): 8.6 TABLET ORAL at 22:10

## 2019-10-16 NOTE — PROGRESS NOTE ADULT - SUBJECTIVE AND OBJECTIVE BOX
No acute events overnight.  Pt reports improved work of breathing.  TTE showed only small pericardial effusions s/p pericardial drain placement.  Left chest tube drained 330cc serosanguinous since time of placement.  Pericardial drain output minimal since initial drainage. No acute events overnight.  Pt reports improved work of breathing.  TTE showed only small pericardial effusions s/p pericardial drain placement.  Left chest tube drained 330cc serosanguinous since time of placement.  Pericardial drain 25cc serous output since initial drainage.

## 2019-10-16 NOTE — PROGRESS NOTE ADULT - ASSESSMENT
60 y/o F c/s for drainage of left pleural effusion and moderate pericardial effusion, stable.  Echo s/p drainage show no evidence of tamponade physiology.  IR will continue to follow.  - monitor drain output    Roland Spencer, PGY2 58 y/o F c/s for drainage of left pleural effusion and moderate pericardial effusion, stable.  Echo s/p drainage show no evidence of tamponade physiology.  IR will continue to follow.  - monitor drain output  - consider removal of pericardial drain <50cc/24hr    Roland Spencer, PGY2

## 2019-10-16 NOTE — PROGRESS NOTE ADULT - SUBJECTIVE AND OBJECTIVE BOX
CTICU  CRITICAL  CARE  attending     Hand off received 					   Pertinent clinical, laboratory, radiographic, hemodynamic, echocardiographic, respiratory data, microbiologic data and chart were reviewed and analyzed frequently throughout the course of the day and night  Patient seen and examined with CTS/ SH attending at bedside  Pt is a 59y , Female, HEALTH ISSUES - PROBLEM Dx:  Pure hypercholesterolemia: Pure hypercholesterolemia  Hypertension, unspecified type: Hypertension, unspecified type  CAD, multiple vessel: CAD, multiple vessel  Pleural effusion on left: Pleural effusion on left  Pericardial effusion: Pericardial effusion      , FAMILY HISTORY:  Family history of early CAD: Brother s/p PCI at age 43  Sister s/p PCI at age 53 + 58  PAST MEDICAL & SURGICAL HISTORY:  CAD, multiple vessel  Diabetes mellitus  Hyperlipemia  Hypertension  S/P CABG x 1  History of cardiac cath: 2013 ([facility])  H/O carotid endarterectomy: R side - Sptember 2018    Patient is a 59y old  Female who presents with a chief complaint of pleural effusion (16 Oct 2019 17:31)      14 system review was unremarkable    Vital signs, hemodynamic and respiratory parameters were reviewed from the bedside nursing flowsheet.  ICU Vital Signs Last 24 Hrs  T(C): 36.3 (17 Oct 2019 09:03), Max: 36.6 (16 Oct 2019 21:43)  T(F): 97.4 (17 Oct 2019 09:03), Max: 97.9 (16 Oct 2019 21:43)  HR: 96 (17 Oct 2019 09:10) (90 - 96)  BP: 145/74 (17 Oct 2019 09:10) (119/67 - 150/77)  BP(mean): 112 (17 Oct 2019 09:10) (97 - 120)  ABP: --  ABP(mean): --  RR: 15 (17 Oct 2019 09:10) (15 - 22)  SpO2: 93% (17 Oct 2019 09:10) (93% - 98%)    Adult Advanced Hemodynamics Last 24 Hrs  CVP(mm Hg): --  CVP(cm H2O): --  CO: --  CI: --  PA: --  PA(mean): --  PCWP: --  SVR: --  SVRI: --  PVR: --  PVRI: --,     Intake and output was reviewed and the fluid balance was calculated  Daily     Daily   I&O's Summary    16 Oct 2019 07:01  -  17 Oct 2019 07:00  --------------------------------------------------------  IN: 380 mL / OUT: 1980 mL / NET: -1600 mL    17 Oct 2019 07:01  -  17 Oct 2019 10:54  --------------------------------------------------------  IN: 0 mL / OUT: 200 mL / NET: -200 mL        All lines and drain sites were assessed  [facility]      POCT Blood Glucose.: 137 mg/dL (17 Oct 2019 06:38)    No acute change in mental status  Auscultation of the chest reveals equal bs  Abdomen is soft  Extremities are warm and well perfused  Wounds appear clean and unremarkable  Antibiotics are periop    labs  CBC Full  -  ( 17 Oct 2019 06:08 )  WBC Count : 7.60 K/uL  RBC Count : 3.69 M/uL  Hemoglobin : 8.4 g/dL  Hematocrit : 28.5 %  Platelet Count - Automated : 415 K/uL  Mean Cell Volume : 77.2 fl  Mean Cell Hemoglobin : 22.8 pg  Mean Cell Hemoglobin Concentration : 29.5 gm/dL  Auto Neutrophil # : x  Auto Lymphocyte # : x  Auto Monocyte # : x  Auto Eosinophil # : x  Auto Basophil # : x  Auto Neutrophil % : x  Auto Lymphocyte % : x  Auto Monocyte % : x  Auto Eosinophil % : x  Auto Basophil % : x    10-17    141  |  104  |  15  ----------------------------<  137<H>  4.2   |  26  |  0.60    Ca    8.9      17 Oct 2019 06:08  Phos  2.7     10-16  Mg     1.8     10-17    TPro  7.0  /  Alb  3.3  /  TBili  0.2  /  DBili  x   /  AST  13  /  ALT  10  /  AlkPhos  80  10-16    PT/INR - ( 16 Oct 2019 09:24 )   PT: 13.2 sec;   INR: 1.16          PTT - ( 16 Oct 2019 09:24 )  PTT:31.0 sec  The current medications were reviewed   MEDICATIONS  (STANDING):  ascorbic acid 500 milliGRAM(s) Oral daily  aspirin enteric coated 81 milliGRAM(s) Oral daily  atorvastatin 20 milliGRAM(s) Oral at bedtime  clopidogrel Tablet 75 milliGRAM(s) Oral daily  dextrose 5%. 1000 milliLiter(s) (50 mL/Hr) IV Continuous <Continuous>  dextrose 50% Injectable 12.5 Gram(s) IV Push once  dextrose 50% Injectable 25 Gram(s) IV Push once  dextrose 50% Injectable 25 Gram(s) IV Push once  docusate sodium 100 milliGRAM(s) Oral three times a day  escitalopram 5 milliGRAM(s) Oral daily  ferrous    sulfate 325 milliGRAM(s) Oral daily  heparin  Injectable 5000 Unit(s) SubCutaneous every 8 hours  ibuprofen  Tablet. 400 milliGRAM(s) Oral every 6 hours  insulin glargine Injectable (LANTUS) 28 Unit(s) SubCutaneous at bedtime  insulin lispro (HumaLOG) corrective regimen sliding scale   SubCutaneous Before meals and at bedtime  insulin lispro Injectable (HumaLOG) 16 Unit(s) SubCutaneous three times a day before meals  magnesium oxide 400 milliGRAM(s) Oral once  metoprolol tartrate 37.5 milliGRAM(s) Oral every 6 hours  pantoprazole    Tablet 40 milliGRAM(s) Oral before breakfast  predniSONE   Tablet 20 milliGRAM(s) Oral daily  senna 2 Tablet(s) Oral at bedtime  sodium chloride 0.9% lock flush 3 milliLiter(s) IV Push every 8 hours    MEDICATIONS  (PRN):  acetaminophen   Tablet .. 650 milliGRAM(s) Oral every 6 hours PRN Mild Pain (1 - 3)  dextrose 40% Gel 15 Gram(s) Oral once PRN Blood Glucose LESS THAN 70 milliGRAM(s)/deciliter  glucagon  Injectable 1 milliGRAM(s) IntraMuscular once PRN Glucose LESS THAN 70 milligrams/deciliter  meclizine 25 milliGRAM(s) Oral daily PRN Dizziness  oxyCODONE    5 mG/acetaminophen 325 mG 1 Tablet(s) Oral every 6 hours PRN Severe Pain (7 - 10)  polyethylene glycol 3350 17 Gram(s) Oral daily PRN Constipation       PROBLEM LIST/ ASSESSMENT:  HEALTH ISSUES - PROBLEM Dx:  Pure hypercholesterolemia: Pure hypercholesterolemia  Hypertension, unspecified type: Hypertension, unspecified type  CAD, multiple vessel: CAD, multiple vessel  Pleural effusion on left: Pleural effusion on left  Pericardial effusion: Pericardial effusion      ,   Patient is a 59y old  Female who presents with a chief complaint of pleural effusion (16 Oct 2019 17:31)     s/p cardiac surgery          My plan includes :  close hemodynamic, ventilatory and drain monitoring and management per post op routine    Monitor for arrhythmias and monitor parameters for organ perfusion  beta blockade not administered due to hemodynamic instability and bradycardia  monitor neurologic status  Head of the bed should remain elevated to 45 deg .   chest PT and IS will be encouraged  monitor adequacy of oxygenation and ventilation and attempt to wean oxygen  antibiotic regimen will be tailored to the clinical, laboratory and microbiologic data  Nutritional goals will be met using po eventually , ensure adequate caloric intake and montior the same  Stress ulcer and VTE prophylaxis will be achieved    Glycemic control is satisfactory  Electrolytes have been repleted as necessary and wound care has been carried out. Pain control has been achieved.   agressive physical therapy and early mobility and ambulation goals will be met   The family was updated about the course and plan  CRITICAL CARE TIME personally provided by me  in evaluation and management, reassessments, review and interpretation of labs and x-rays, ventilator and hemodynamic management, formulating a plan and coordinating care: ___90____ MIN.  Time does not include procedural time.  CTICU ATTENDING     					    Garth Peterson MD

## 2019-10-16 NOTE — PROGRESS NOTE ADULT - ASSESSMENT
A/P: 59 year old female with PMHx of DM, HTN, HLD, CAD s/p MIDCAB 9/27 and PCI x 2 10/2/19 presented to [facility] ED complaining of progressively worsening fever, chills, SOB and left sided pleuritic chest pain. She had a CT chest which revealed moderate pericardial effusion and left pleural effusion and was empirically started on IV abx. She was transferred to [facility] for further evaluation, on admission bedside TTE revealed large pericardial effusion with signs of tamponade. She was taken urgently to  and underwent pericardial drain placement and left pigtail catheter placement. During procedure patient became tachycardic to 250 treated with IV lopressor and fluids with improvement she was transferred to CT ICU post procedure for closer monitoring. She remained stable throughout the day and evening and was transferred to  on 10/16/19     Neurovascular: Stable. Pain well controlled with current regimen.  - Continue percocet and tylenol PRN   - Continue lexapro 5mg for depression     Cardiovascular: Hemodynamically stable. HR controlled.  - CAD s/p MIDCAB and PCI. Continue asa 81mg, plavix 75mg, atorvastatin 20mg qhs. Metoprolol increased to 25mg Q6 this AM, continue to titrate as tolerated.   - Pericardial effusion s/p IR drainage draining 200cc initially and additional 25cc overnight. Repeat TTE today, d/c drain pending echo results. Continue medrol dose ganga and NSAIDs for presumed pericarditis     Respiratory: 02 Sat = 98% on 3L NC   - Continue to wean O2 to maintain SaO2 > 93%   - large left pleural effusion s/p IR drainage. Keep chest tube to suction today, may remove tomorrow pending drainage. Follow up CXR in am   - Encourage C+DB and Use of IS 10x / hr while awake.    GI: Stable.  - Continue PO diet   - Continue protonix 40mg for GI ppx   - Continue bowel regimen     Renal / : Cr 0.54, no active issues   - Monitor renal function.  - Monitor I/O's.    Endocrine: hgba1c 8.2, TSH wnl   - Continue Lantus 28 u qhs and Humalog 16 u TID.   - Continue insulin sliding scale   - Monitor fingersticks       ID: Presumed pericarditis  - Blood cultures from [facility] + gram positive rods in 2 aerobic bottles, no growth from anerobic bottles. Likely contaminant, follow up speciation from OSH   - Blood cultures from admission at [facility] NGTD   - Influenza A and B negative at OSH   - No antibiotics for now     Prophylaxis:  - DVT prophylaxis with 5000 SubQ Heparin q8h.  - SCD's    Disposition: transfer to

## 2019-10-16 NOTE — PROGRESS NOTE ADULT - SUBJECTIVE AND OBJECTIVE BOX
9E to 9L transfer / acceptance note     Operation / Date:  9/27/19 MIDCAB readmitted with pericardial effusion   10/15: IR pericardial drain     SUBJECTIVE ASSESSMENT:  Patient seen this morning at bedside, doing well and not offering any complaints at this time. Still has tenderness around pericardial drain and pleural drain but is improved compared to yesterday. She denies any chest pressure or shortness of breath.     Vital Signs Last 24 Hrs  T(C): 36.3 (16 Oct 2019 10:25), Max: 37.1 (15 Oct 2019 14:00)  T(F): 97.4 (16 Oct 2019 10:25), Max: 98.7 (15 Oct 2019 14:00)  HR: 96 (16 Oct 2019 10:00) (88 - 122)  BP: 137/72 (16 Oct 2019 09:00) (112/70 - 156/90)  BP(mean): 100 (16 Oct 2019 09:00) (83 - 115)  RR: 26 (16 Oct 2019 10:00) (19 - 28)  SpO2: 97% (16 Oct 2019 10:00) (93% - 100%)  I&O's Detail    15 Oct 2019 07:01  -  16 Oct 2019 07:00  --------------------------------------------------------  IN:    Albumin 5%  - 250 mL: 500 mL    IV PiggyBack: 150 mL    lactated ringers.: 900 mL    Oral Fluid: 700 mL  Total IN: 2250 mL    OUT:    Bulb: 100 mL    Chest Tube: 330 mL    Voided: 1575 mL  Total OUT: 2005 mL    Total NET: 245 mL      16 Oct 2019 07:01  -  16 Oct 2019 11:05  --------------------------------------------------------  IN:  Total IN: 0 mL    OUT:    Chest Tube: 60 mL    Voided: 400 mL  Total OUT: 460 mL    Total NET: -460 mL      CHEST TUBE:  Yes on suction, no air leak   JOHNSON DRAIN: Yes pericardial drain on self suction   EPICARDIAL WIRES: No   TIE DOWNS: No  MESSINA: No    PHYSICAL EXAM:    General: Patient lying comfortably in bed, no acute distress     Neurological: Alert and oriented. No focal neurological deficits     Cardiovascular: S1S2, RRR, no murmurs appreciated on exam     Respiratory: Clear to ausculation bilaterally     Gastrointestinal: Abdomen soft, non tender, non distended     Extremities: Warm and well perfused. No edema or calf tenderness     Vascular: Peripheral pulses 2+ bilaterally     Incision Sites: L inframammary thoracotomy incision C/D/I   Pericardial drain and Chest tube site C/D/I     LABS:                        8.9    6.82  )-----------( 421      ( 16 Oct 2019 09:24 )             30.5       COUMADIN:  No    PT/INR - ( 16 Oct 2019 09:24 )   PT: 13.2 sec;   INR: 1.16          PTT - ( 16 Oct 2019 09:24 )  PTT:31.0 sec    10-16    141  |  104  |  8   ----------------------------<  180<H>  4.5   |  25  |  0.54    Ca    9.0      16 Oct 2019 09:24  Phos  2.7     10-16  Mg     2.0     10-16    TPro  7.0  /  Alb  3.3  /  TBili  0.2  /  DBili  x   /  AST  13  /  ALT  10  /  AlkPhos  80  10-16      MEDICATIONS  (STANDING):  ascorbic acid 500 milliGRAM(s) Oral daily  aspirin enteric coated 81 milliGRAM(s) Oral daily  atorvastatin 20 milliGRAM(s) Oral at bedtime  clopidogrel Tablet 75 milliGRAM(s) Oral daily  docusate sodium 100 milliGRAM(s) Oral three times a day  escitalopram 5 milliGRAM(s) Oral daily  ferrous    sulfate 325 milliGRAM(s) Oral daily  heparin  Injectable 5000 Unit(s) SubCutaneous every 8 hours  ibuprofen  Tablet. 400 milliGRAM(s) Oral every 6 hours  insulin glargine Injectable (LANTUS) 28 Unit(s) SubCutaneous at bedtime  insulin lispro (HumaLOG) corrective regimen sliding scale   SubCutaneous Before meals and at bedtime  insulin lispro Injectable (HumaLOG) 16 Unit(s) SubCutaneous three times a day before meals  metoprolol tartrate 12.5 milliGRAM(s) Oral every 12 hours  pantoprazole    Tablet 40 milliGRAM(s) Oral before breakfast  predniSONE   Tablet 20 milliGRAM(s) Oral daily  senna 2 Tablet(s) Oral at bedtime  sodium chloride 0.9% lock flush 3 milliLiter(s) IV Push every 8 hours    MEDICATIONS  (PRN):  acetaminophen   Tablet .. 650 milliGRAM(s) Oral every 6 hours PRN Mild Pain (1 - 3)  meclizine 25 milliGRAM(s) Oral daily PRN Dizziness  oxyCODONE    5 mG/acetaminophen 325 mG 1 Tablet(s) Oral every 6 hours PRN Severe Pain (7 - 10)  polyethylene glycol 3350 17 Gram(s) Oral daily PRN Constipation        RADIOLOGY & ADDITIONAL TESTS:

## 2019-10-17 DIAGNOSIS — F41.8 OTHER SPECIFIED ANXIETY DISORDERS: ICD-10-CM

## 2019-10-17 DIAGNOSIS — E87.2 ACIDOSIS: ICD-10-CM

## 2019-10-17 DIAGNOSIS — E11.65 TYPE 2 DIABETES MELLITUS WITH HYPERGLYCEMIA: ICD-10-CM

## 2019-10-17 DIAGNOSIS — J90 PLEURAL EFFUSION, NOT ELSEWHERE CLASSIFIED: ICD-10-CM

## 2019-10-17 DIAGNOSIS — Z82.49 FAMILY HISTORY OF ISCHEMIC HEART DISEASE AND OTHER DISEASES OF THE CIRCULATORY SYSTEM: ICD-10-CM

## 2019-10-17 DIAGNOSIS — E11.51 TYPE 2 DIABETES MELLITUS WITH DIABETIC PERIPHERAL ANGIOPATHY WITHOUT GANGRENE: ICD-10-CM

## 2019-10-17 DIAGNOSIS — J98.11 ATELECTASIS: ICD-10-CM

## 2019-10-17 DIAGNOSIS — I25.10 ATHEROSCLEROTIC HEART DISEASE OF NATIVE CORONARY ARTERY WITHOUT ANGINA PECTORIS: ICD-10-CM

## 2019-10-17 DIAGNOSIS — I10 ESSENTIAL (PRIMARY) HYPERTENSION: ICD-10-CM

## 2019-10-17 DIAGNOSIS — Z83.3 FAMILY HISTORY OF DIABETES MELLITUS: ICD-10-CM

## 2019-10-17 DIAGNOSIS — Z87.891 PERSONAL HISTORY OF NICOTINE DEPENDENCE: ICD-10-CM

## 2019-10-17 DIAGNOSIS — D68.9 COAGULATION DEFECT, UNSPECIFIED: ICD-10-CM

## 2019-10-17 DIAGNOSIS — R00.1 BRADYCARDIA, UNSPECIFIED: ICD-10-CM

## 2019-10-17 DIAGNOSIS — I25.82 CHRONIC TOTAL OCCLUSION OF CORONARY ARTERY: ICD-10-CM

## 2019-10-17 DIAGNOSIS — E78.5 HYPERLIPIDEMIA, UNSPECIFIED: ICD-10-CM

## 2019-10-17 DIAGNOSIS — Z98.890 OTHER SPECIFIED POSTPROCEDURAL STATES: ICD-10-CM

## 2019-10-17 DIAGNOSIS — Z79.4 LONG TERM (CURRENT) USE OF INSULIN: ICD-10-CM

## 2019-10-17 LAB
ANION GAP SERPL CALC-SCNC: 11 MMOL/L — SIGNIFICANT CHANGE UP (ref 5–17)
BUN SERPL-MCNC: 15 MG/DL — SIGNIFICANT CHANGE UP (ref 7–23)
CALCIUM SERPL-MCNC: 8.9 MG/DL — SIGNIFICANT CHANGE UP (ref 8.4–10.5)
CHLORIDE SERPL-SCNC: 104 MMOL/L — SIGNIFICANT CHANGE UP (ref 96–108)
CO2 SERPL-SCNC: 26 MMOL/L — SIGNIFICANT CHANGE UP (ref 22–31)
CREAT SERPL-MCNC: 0.6 MG/DL — SIGNIFICANT CHANGE UP (ref 0.5–1.3)
GLUCOSE BLDC GLUCOMTR-MCNC: 137 MG/DL — HIGH (ref 70–99)
GLUCOSE BLDC GLUCOMTR-MCNC: 208 MG/DL — HIGH (ref 70–99)
GLUCOSE BLDC GLUCOMTR-MCNC: 223 MG/DL — HIGH (ref 70–99)
GLUCOSE BLDC GLUCOMTR-MCNC: 259 MG/DL — HIGH (ref 70–99)
GLUCOSE SERPL-MCNC: 137 MG/DL — HIGH (ref 70–99)
HCT VFR BLD CALC: 28.5 % — LOW (ref 34.5–45)
HGB BLD-MCNC: 8.4 G/DL — LOW (ref 11.5–15.5)
MAGNESIUM SERPL-MCNC: 1.8 MG/DL — SIGNIFICANT CHANGE UP (ref 1.6–2.6)
MCHC RBC-ENTMCNC: 22.8 PG — LOW (ref 27–34)
MCHC RBC-ENTMCNC: 29.5 GM/DL — LOW (ref 32–36)
MCV RBC AUTO: 77.2 FL — LOW (ref 80–100)
NRBC # BLD: 0 /100 WBCS — SIGNIFICANT CHANGE UP (ref 0–0)
PLATELET # BLD AUTO: 415 K/UL — HIGH (ref 150–400)
POTASSIUM SERPL-MCNC: 4.2 MMOL/L — SIGNIFICANT CHANGE UP (ref 3.5–5.3)
POTASSIUM SERPL-SCNC: 4.2 MMOL/L — SIGNIFICANT CHANGE UP (ref 3.5–5.3)
RBC # BLD: 3.69 M/UL — LOW (ref 3.8–5.2)
RBC # FLD: 14.5 % — SIGNIFICANT CHANGE UP (ref 10.3–14.5)
SODIUM SERPL-SCNC: 141 MMOL/L — SIGNIFICANT CHANGE UP (ref 135–145)
WBC # BLD: 7.6 K/UL — SIGNIFICANT CHANGE UP (ref 3.8–10.5)
WBC # FLD AUTO: 7.6 K/UL — SIGNIFICANT CHANGE UP (ref 3.8–10.5)

## 2019-10-17 PROCEDURE — 71045 X-RAY EXAM CHEST 1 VIEW: CPT | Mod: 26

## 2019-10-17 RX ORDER — MAGNESIUM OXIDE 400 MG ORAL TABLET 241.3 MG
400 TABLET ORAL ONCE
Refills: 0 | Status: COMPLETED | OUTPATIENT
Start: 2019-10-17 | End: 2019-10-17

## 2019-10-17 RX ORDER — METOPROLOL TARTRATE 50 MG
37.5 TABLET ORAL EVERY 6 HOURS
Refills: 0 | Status: DISCONTINUED | OUTPATIENT
Start: 2019-10-17 | End: 2019-10-18

## 2019-10-17 RX ADMIN — Medication 37.5 MILLIGRAM(S): at 13:29

## 2019-10-17 RX ADMIN — ESCITALOPRAM OXALATE 5 MILLIGRAM(S): 10 TABLET, FILM COATED ORAL at 10:54

## 2019-10-17 RX ADMIN — Medication 400 MILLIGRAM(S): at 16:58

## 2019-10-17 RX ADMIN — Medication 16 UNIT(S): at 16:59

## 2019-10-17 RX ADMIN — OXYCODONE AND ACETAMINOPHEN 1 TABLET(S): 5; 325 TABLET ORAL at 01:00

## 2019-10-17 RX ADMIN — HEPARIN SODIUM 5000 UNIT(S): 5000 INJECTION INTRAVENOUS; SUBCUTANEOUS at 13:29

## 2019-10-17 RX ADMIN — Medication 400 MILLIGRAM(S): at 22:30

## 2019-10-17 RX ADMIN — Medication 4: at 22:05

## 2019-10-17 RX ADMIN — Medication 4: at 11:35

## 2019-10-17 RX ADMIN — HEPARIN SODIUM 5000 UNIT(S): 5000 INJECTION INTRAVENOUS; SUBCUTANEOUS at 05:58

## 2019-10-17 RX ADMIN — Medication 100 MILLIGRAM(S): at 05:58

## 2019-10-17 RX ADMIN — PANTOPRAZOLE SODIUM 40 MILLIGRAM(S): 20 TABLET, DELAYED RELEASE ORAL at 06:13

## 2019-10-17 RX ADMIN — Medication 400 MILLIGRAM(S): at 07:00

## 2019-10-17 RX ADMIN — Medication 500 MILLIGRAM(S): at 13:44

## 2019-10-17 RX ADMIN — OXYCODONE AND ACETAMINOPHEN 1 TABLET(S): 5; 325 TABLET ORAL at 15:07

## 2019-10-17 RX ADMIN — Medication 16 UNIT(S): at 07:27

## 2019-10-17 RX ADMIN — OXYCODONE AND ACETAMINOPHEN 1 TABLET(S): 5; 325 TABLET ORAL at 09:07

## 2019-10-17 RX ADMIN — CLOPIDOGREL BISULFATE 75 MILLIGRAM(S): 75 TABLET, FILM COATED ORAL at 10:54

## 2019-10-17 RX ADMIN — SODIUM CHLORIDE 3 MILLILITER(S): 9 INJECTION INTRAMUSCULAR; INTRAVENOUS; SUBCUTANEOUS at 22:31

## 2019-10-17 RX ADMIN — Medication 400 MILLIGRAM(S): at 05:57

## 2019-10-17 RX ADMIN — MAGNESIUM OXIDE 400 MG ORAL TABLET 400 MILLIGRAM(S): 241.3 TABLET ORAL at 10:54

## 2019-10-17 RX ADMIN — ATORVASTATIN CALCIUM 20 MILLIGRAM(S): 80 TABLET, FILM COATED ORAL at 22:04

## 2019-10-17 RX ADMIN — Medication 100 MILLIGRAM(S): at 22:04

## 2019-10-17 RX ADMIN — Medication 25 MILLIGRAM(S): at 05:58

## 2019-10-17 RX ADMIN — Medication 16 UNIT(S): at 11:36

## 2019-10-17 RX ADMIN — Medication 6: at 16:59

## 2019-10-17 RX ADMIN — Medication 37.5 MILLIGRAM(S): at 18:42

## 2019-10-17 RX ADMIN — SODIUM CHLORIDE 3 MILLILITER(S): 9 INJECTION INTRAMUSCULAR; INTRAVENOUS; SUBCUTANEOUS at 13:39

## 2019-10-17 RX ADMIN — Medication 100 MILLIGRAM(S): at 13:29

## 2019-10-17 RX ADMIN — Medication 25 MILLIGRAM(S): at 00:13

## 2019-10-17 RX ADMIN — Medication 20 MILLIGRAM(S): at 05:57

## 2019-10-17 RX ADMIN — INSULIN GLARGINE 28 UNIT(S): 100 INJECTION, SOLUTION SUBCUTANEOUS at 22:04

## 2019-10-17 RX ADMIN — Medication 400 MILLIGRAM(S): at 22:04

## 2019-10-17 RX ADMIN — HEPARIN SODIUM 5000 UNIT(S): 5000 INJECTION INTRAVENOUS; SUBCUTANEOUS at 22:04

## 2019-10-17 RX ADMIN — Medication 400 MILLIGRAM(S): at 11:39

## 2019-10-17 RX ADMIN — Medication 400 MILLIGRAM(S): at 18:41

## 2019-10-17 RX ADMIN — Medication 325 MILLIGRAM(S): at 10:54

## 2019-10-17 RX ADMIN — Medication 400 MILLIGRAM(S): at 10:54

## 2019-10-17 RX ADMIN — OXYCODONE AND ACETAMINOPHEN 1 TABLET(S): 5; 325 TABLET ORAL at 16:13

## 2019-10-17 RX ADMIN — OXYCODONE AND ACETAMINOPHEN 1 TABLET(S): 5; 325 TABLET ORAL at 09:54

## 2019-10-17 RX ADMIN — Medication 81 MILLIGRAM(S): at 10:54

## 2019-10-17 RX ADMIN — OXYCODONE AND ACETAMINOPHEN 1 TABLET(S): 5; 325 TABLET ORAL at 00:13

## 2019-10-17 RX ADMIN — SODIUM CHLORIDE 3 MILLILITER(S): 9 INJECTION INTRAMUSCULAR; INTRAVENOUS; SUBCUTANEOUS at 05:59

## 2019-10-17 RX ADMIN — SENNA PLUS 2 TABLET(S): 8.6 TABLET ORAL at 22:04

## 2019-10-17 NOTE — DIETITIAN INITIAL EVALUATION ADULT. - ADD RECOMMEND
1. Encourage intake through day 2. Manage pain prn 3. Monitor and replete lytes prn 4. Reinforce ed prn

## 2019-10-17 NOTE — DIETITIAN INITIAL EVALUATION ADULT. - ENERGY NEEDS
Ideal body weight used for calculations as pt >120% of IBW.   ABW 80.6kg, IBW 61kg, 131% IBW, ht 67", BMI 27.8   Nutrient needs based on [facility] standards of care for maintenance in adults, adjusted for post-op needs, fluid per team

## 2019-10-17 NOTE — PROGRESS NOTE ADULT - SUBJECTIVE AND OBJECTIVE BOX
Patient discussed on morning rounds with Dr. Arenas     Operation / Date:  9/27/19 MIDCAB readmitted with pericardial effusion   10/15: IR pericardial drain     SUBJECTIVE ASSESSMENT:  Patient seen this morning at bedside, doing well and not offering any complaints at this time. Still has tenderness around pericardial drain and pleural drain but is improved compared to yesterday. She denies any chest pressure or shortness of breath.       Vital Signs Last 24 Hrs  T(C): 36.8 (17 Oct 2019 17:42), Max: 36.8 (17 Oct 2019 17:42)  T(F): 98.2 (17 Oct 2019 17:42), Max: 98.2 (17 Oct 2019 17:42)  HR: 82 (17 Oct 2019 16:31) (82 - 96)  BP: 141/71 (17 Oct 2019 16:31) (119/67 - 150/77)  BP(mean): 101 (17 Oct 2019 16:31) (83 - 112)  RR: 18 (17 Oct 2019 16:31) (15 - 21)  SpO2: 94% (17 Oct 2019 16:31) (93% - 98%)  I&O's Detail    16 Oct 2019 07:01  -  17 Oct 2019 07:00  --------------------------------------------------------  IN:    Oral Fluid: 380 mL  Total IN: 380 mL    OUT:    Bulb: 10 mL    Chest Tube: 225 mL    Voided: 1745 mL  Total OUT: 1980 mL    Total NET: -1600 mL      17 Oct 2019 07:01  -  17 Oct 2019 17:46  --------------------------------------------------------  IN:  Total IN: 0 mL    OUT:    Voided: 1000 mL  Total OUT: 1000 mL    Total NET: -1000 mL    CHEST TUBE:  removed  JOHNSON DRAIN:  No.  EPICARDIAL WIRES: No.  TIE DOWNS: Yes. - at right chest pigtail site  MESSINA: No.    PHYSICAL EXAM:    General: NAD    Neurological: A&Ox3    Cardiovascular: s1S2 RRR    Respiratory: CTA b/l no W/R/R    Gastrointestinal: soft NT/ND +BS    Extremities: no edema    Vascular: warm and well perfused bilaterally    Incision Sites: MSI C/D/I      LABS:                        8.4    7.60  )-----------( 415      ( 17 Oct 2019 06:08 )             28.5       COUMADIN:  Yes/No. REASON: .    PT/INR - ( 16 Oct 2019 09:24 )   PT: 13.2 sec;   INR: 1.16          PTT - ( 16 Oct 2019 09:24 )  PTT:31.0 sec    10-17    141  |  104  |  15  ----------------------------<  137<H>  4.2   |  26  |  0.60    Ca    8.9      17 Oct 2019 06:08  Phos  2.7     10-16  Mg     1.8     10-17    TPro  7.0  /  Alb  3.3  /  TBili  0.2  /  DBili  x   /  AST  13  /  ALT  10  /  AlkPhos  80  10-16    MEDICATIONS  (STANDING):  ascorbic acid 500 milliGRAM(s) Oral daily  aspirin enteric coated 81 milliGRAM(s) Oral daily  atorvastatin 20 milliGRAM(s) Oral at bedtime  clopidogrel Tablet 75 milliGRAM(s) Oral daily  dextrose 5%. 1000 milliLiter(s) (50 mL/Hr) IV Continuous <Continuous>  dextrose 50% Injectable 12.5 Gram(s) IV Push once  dextrose 50% Injectable 25 Gram(s) IV Push once  dextrose 50% Injectable 25 Gram(s) IV Push once  docusate sodium 100 milliGRAM(s) Oral three times a day  escitalopram 5 milliGRAM(s) Oral daily  ferrous    sulfate 325 milliGRAM(s) Oral daily  heparin  Injectable 5000 Unit(s) SubCutaneous every 8 hours  ibuprofen  Tablet. 400 milliGRAM(s) Oral every 6 hours  insulin glargine Injectable (LANTUS) 28 Unit(s) SubCutaneous at bedtime  insulin lispro (HumaLOG) corrective regimen sliding scale   SubCutaneous Before meals and at bedtime  insulin lispro Injectable (HumaLOG) 16 Unit(s) SubCutaneous three times a day before meals  metoprolol tartrate 37.5 milliGRAM(s) Oral every 6 hours  pantoprazole    Tablet 40 milliGRAM(s) Oral before breakfast  predniSONE   Tablet 20 milliGRAM(s) Oral daily  senna 2 Tablet(s) Oral at bedtime  sodium chloride 0.9% lock flush 3 milliLiter(s) IV Push every 8 hours    MEDICATIONS  (PRN):  acetaminophen   Tablet .. 650 milliGRAM(s) Oral every 6 hours PRN Mild Pain (1 - 3)  dextrose 40% Gel 15 Gram(s) Oral once PRN Blood Glucose LESS THAN 70 milliGRAM(s)/deciliter  glucagon  Injectable 1 milliGRAM(s) IntraMuscular once PRN Glucose LESS THAN 70 milligrams/deciliter  meclizine 25 milliGRAM(s) Oral daily PRN Dizziness  oxyCODONE    5 mG/acetaminophen 325 mG 1 Tablet(s) Oral every 6 hours PRN Severe Pain (7 - 10)  polyethylene glycol 3350 17 Gram(s) Oral daily PRN Constipation    RADIOLOGY & ADDITIONAL TESTS:  CXR: no effusions/infiltrates/PTX s/p chest tube removal

## 2019-10-17 NOTE — PROGRESS NOTE ADULT - ASSESSMENT
A/P: 59 year old female with PMHx of DM, HTN, HLD, CAD s/p MIDCAB 9/27 and PCI x 2 10/2/19 presented to [facility] ED complaining of progressively worsening fever, chills, SOB and left sided pleuritic chest pain. She had a CT chest which revealed moderate pericardial effusion and left pleural effusion and was empirically started on IV abx. She was transferred to [facility] for further evaluation, on admission bedside TTE revealed large pericardial effusion with signs of tamponade. She was taken urgently to  and underwent pericardial drain placement and left pigtail catheter placement. During procedure patient became tachycardic to 250 treated with IV lopressor and fluids with improvement she was transferred to CT ICU post procedure for closer monitoring. She remained stable throughout the day and evening and was transferred to  on 10/16/19     Neurovascular: Stable. Pain well controlled with current regimen.  - Continue percocet and tylenol PRN   - Continue lexapro 5mg for depression     Cardiovascular: Hemodynamically stable. HR controlled.  - CAD s/p MIDCAB and PCI. Continue asa 81mg, plavix 75mg, atorvastatin 20mg qhs. Metoprolol increased to 25mg Q6 this AM, continue to titrate as tolerated.   - Pericardial effusion s/p IR drainage draining 200cc initially and additional 25cc overnight. Repeat TTE today, d/c drain pending echo results. Continue medrol dose ganga and NSAIDs for presumed pericarditis     Respiratory: 02 Sat = 98% on 3L NC   - Continue to wean O2 to maintain SaO2 > 93%   - large left pleural effusion s/p IR drainage. Keep chest tube to suction today, may remove tomorrow pending drainage. Follow up CXR in am   - Encourage C+DB and Use of IS 10x / hr while awake.    GI: Stable.  - Continue PO diet   - Continue protonix 40mg for GI ppx   - Continue bowel regimen     Renal / : Cr 0.54, no active issues   - Monitor renal function.  - Monitor I/O's.    Endocrine: hgba1c 8.2, TSH wnl   - Continue Lantus 28 u qhs and Humalog 16 u TID.   - Continue insulin sliding scale   - Monitor fingersticks       ID: Presumed pericarditis  - Blood cultures from [facility] + gram positive rods in 2 aerobic bottles, no growth from anerobic bottles. Likely contaminant, follow up speciation from OSH   - Blood cultures from admission at [facility] NGTD   - Influenza A and B negative at OSH   - No antibiotics for now     Prophylaxis:  - DVT prophylaxis with 5000 SubQ Heparin q8h.  - SCD's    Disposition: home tomorrow A/P: 59 year old female with PMHx of DM, HTN, HLD, CAD s/p MIDCAB 9/27 and PCI x 2 10/2/19 presented to [facility] ED complaining of progressively worsening fever, chills, SOB and left sided pleuritic chest pain. She had a CT chest which revealed moderate pericardial effusion and left pleural effusion and was empirically started on IV abx. She was transferred to [facility] for further evaluation, on admission bedside TTE revealed large pericardial effusion with signs of tamponade. She was taken urgently to  and underwent pericardial drain placement and left pigtail catheter placement. During procedure patient became tachycardic to 250 treated with IV lopressor and fluids with improvement she was transferred to CT ICU post procedure for closer monitoring. She remained stable throughout the day and evening and was transferred to  on 10/16/19, drains removed 10/17     Neurovascular: Stable. Pain well controlled with current regimen.  - Continue percocet and tylenol PRN   - Continue lexapro 5mg for depression     Cardiovascular: Hemodynamically stable. HR controlled.  - CAD s/p MIDCAB and PCI. Continue asa 81mg, plavix 75mg, atorvastatin 20mg qhs. Metoprolol increased to 25mg Q6 this AM, continue to titrate as tolerated.   - pericardial drain removed, repeat echo in AM    Respiratory: 02 Sat = 98% on RA   - Continue to wean O2 to maintain SaO2 > 93%   - pleural drain removed, repeat CXR in am   - Encourage C+DB and Use of IS 10x / hr while awake.    GI: Stable.  - Continue PO diet   - Continue protonix 40mg for GI ppx   - Continue bowel regimen     Renal / : Cr 0.54, no active issues   - Monitor renal function.  - Monitor I/O's.    Endocrine: hgba1c 8.2, TSH wnl   - Continue Lantus 28 u qhs and Humalog 16 u TID.   - Continue insulin sliding scale   - Monitor fingersticks       ID: Presumed pericarditis  - Blood cultures from [facility] + gram positive rods in 2 aerobic bottles, no growth from anerobic bottles. Likely contaminant, follow up speciation from OSH   - Blood cultures from admission at [facility] NGTD   - Influenza A and B negative at OSH   - No antibiotics for now     Prophylaxis:  - DVT prophylaxis with 5000 SubQ Heparin q8h.  - SCD's    Disposition: home tomorrow

## 2019-10-17 NOTE — DIETITIAN INITIAL EVALUATION ADULT. - OTHER INFO
59F PMH CAD (s/p MIDCABx1 9/27/19 & PCI x 2 10/2/19), DM2 (A1C 8.2), HTN, hypercholesterolemia who presented to outside facility ED c/o 4 days of progressively worsening fever, chills, SOB, left side chest pain and back pain. Subsequently had CT chest non contrast which showed moderate pericardial effusion, moderate L effusion with compression atelectasis, small right pleural effusion. Transferred back to [facility] for further management and evaluation, now s/p pericardial drain placement. Known to nutrition services from prior admission end of September, since has +4kg wt gain likely in setting of fluid retention, pt herself unsure of UBW. Denies n/v/d/c, chewing/ swallowing issues, skin with surgical incision. Notes pain to left side/ "where the tubes are", being managed. Notes poor intake while admitted, typically with good intake at home. Encouraged intake through day to best meet needs, discussed DM diet restrictions with pt, will follow for further reinforcement when family is available.

## 2019-10-18 ENCOUNTER — TRANSCRIPTION ENCOUNTER (OUTPATIENT)
Age: 59
End: 2019-10-18

## 2019-10-18 VITALS — RESPIRATION RATE: 15 BRPM | HEART RATE: 82 BPM | OXYGEN SATURATION: 96 %

## 2019-10-18 PROBLEM — I25.10 ATHEROSCLEROTIC HEART DISEASE OF NATIVE CORONARY ARTERY WITHOUT ANGINA PECTORIS: Chronic | Status: ACTIVE | Noted: 2019-10-15

## 2019-10-18 LAB
ANION GAP SERPL CALC-SCNC: 11 MMOL/L — SIGNIFICANT CHANGE UP (ref 5–17)
BUN SERPL-MCNC: 19 MG/DL — SIGNIFICANT CHANGE UP (ref 7–23)
CALCIUM SERPL-MCNC: 9.2 MG/DL — SIGNIFICANT CHANGE UP (ref 8.4–10.5)
CHLORIDE SERPL-SCNC: 104 MMOL/L — SIGNIFICANT CHANGE UP (ref 96–108)
CO2 SERPL-SCNC: 28 MMOL/L — SIGNIFICANT CHANGE UP (ref 22–31)
CREAT SERPL-MCNC: 0.66 MG/DL — SIGNIFICANT CHANGE UP (ref 0.5–1.3)
GLUCOSE BLDC GLUCOMTR-MCNC: 115 MG/DL — HIGH (ref 70–99)
GLUCOSE BLDC GLUCOMTR-MCNC: 138 MG/DL — HIGH (ref 70–99)
GLUCOSE BLDC GLUCOMTR-MCNC: 157 MG/DL — HIGH (ref 70–99)
GLUCOSE SERPL-MCNC: 105 MG/DL — HIGH (ref 70–99)
HCT VFR BLD CALC: 29.7 % — LOW (ref 34.5–45)
HGB BLD-MCNC: 8.9 G/DL — LOW (ref 11.5–15.5)
MAGNESIUM SERPL-MCNC: 1.8 MG/DL — SIGNIFICANT CHANGE UP (ref 1.6–2.6)
MCHC RBC-ENTMCNC: 22.7 PG — LOW (ref 27–34)
MCHC RBC-ENTMCNC: 30 GM/DL — LOW (ref 32–36)
MCV RBC AUTO: 75.8 FL — LOW (ref 80–100)
NRBC # BLD: 0 /100 WBCS — SIGNIFICANT CHANGE UP (ref 0–0)
PLATELET # BLD AUTO: 437 K/UL — HIGH (ref 150–400)
POTASSIUM SERPL-MCNC: 3.9 MMOL/L — SIGNIFICANT CHANGE UP (ref 3.5–5.3)
POTASSIUM SERPL-SCNC: 3.9 MMOL/L — SIGNIFICANT CHANGE UP (ref 3.5–5.3)
RBC # BLD: 3.92 M/UL — SIGNIFICANT CHANGE UP (ref 3.8–5.2)
RBC # FLD: 14.5 % — SIGNIFICANT CHANGE UP (ref 10.3–14.5)
SODIUM SERPL-SCNC: 143 MMOL/L — SIGNIFICANT CHANGE UP (ref 135–145)
WBC # BLD: 7.59 K/UL — SIGNIFICANT CHANGE UP (ref 3.8–10.5)
WBC # FLD AUTO: 7.59 K/UL — SIGNIFICANT CHANGE UP (ref 3.8–10.5)

## 2019-10-18 PROCEDURE — 71045 X-RAY EXAM CHEST 1 VIEW: CPT | Mod: 26,59

## 2019-10-18 PROCEDURE — 71046 X-RAY EXAM CHEST 2 VIEWS: CPT | Mod: 26

## 2019-10-18 PROCEDURE — 99231 SBSQ HOSP IP/OBS SF/LOW 25: CPT

## 2019-10-18 RX ORDER — CLOPIDOGREL BISULFATE 75 MG/1
1 TABLET, FILM COATED ORAL
Qty: 30 | Refills: 0
Start: 2019-10-18 | End: 2019-11-16

## 2019-10-18 RX ORDER — MAGNESIUM OXIDE 400 MG ORAL TABLET 241.3 MG
800 TABLET ORAL ONCE
Refills: 0 | Status: COMPLETED | OUTPATIENT
Start: 2019-10-18 | End: 2019-10-18

## 2019-10-18 RX ORDER — METOPROLOL TARTRATE 50 MG
37.5 TABLET ORAL ONCE
Refills: 0 | Status: COMPLETED | OUTPATIENT
Start: 2019-10-18 | End: 2019-10-18

## 2019-10-18 RX ORDER — ESCITALOPRAM OXALATE 10 MG/1
1 TABLET, FILM COATED ORAL
Qty: 30 | Refills: 0
Start: 2019-10-18 | End: 2019-11-16

## 2019-10-18 RX ORDER — SIMVASTATIN 20 MG/1
1 TABLET, FILM COATED ORAL
Qty: 30 | Refills: 0
Start: 2019-10-18 | End: 2019-11-16

## 2019-10-18 RX ORDER — METOPROLOL TARTRATE 50 MG
75 TABLET ORAL EVERY 12 HOURS
Refills: 0 | Status: DISCONTINUED | OUTPATIENT
Start: 2019-10-18 | End: 2019-10-18

## 2019-10-18 RX ORDER — SENNA PLUS 8.6 MG/1
2 TABLET ORAL
Qty: 60 | Refills: 0
Start: 2019-10-18 | End: 2019-11-16

## 2019-10-18 RX ORDER — ASCORBIC ACID 60 MG
1 TABLET,CHEWABLE ORAL
Qty: 30 | Refills: 0
Start: 2019-10-18 | End: 2019-11-16

## 2019-10-18 RX ORDER — ASPIRIN/CALCIUM CARB/MAGNESIUM 324 MG
1 TABLET ORAL
Qty: 30 | Refills: 0
Start: 2019-10-18 | End: 2019-11-16

## 2019-10-18 RX ORDER — POTASSIUM CHLORIDE 20 MEQ
20 PACKET (EA) ORAL ONCE
Refills: 0 | Status: COMPLETED | OUTPATIENT
Start: 2019-10-18 | End: 2019-10-18

## 2019-10-18 RX ORDER — ACETAMINOPHEN 500 MG
2 TABLET ORAL
Qty: 240 | Refills: 0
Start: 2019-10-18 | End: 2019-11-16

## 2019-10-18 RX ORDER — METOPROLOL TARTRATE 50 MG
1 TABLET ORAL
Qty: 60 | Refills: 0
Start: 2019-10-18 | End: 2019-11-16

## 2019-10-18 RX ORDER — PANTOPRAZOLE SODIUM 20 MG/1
1 TABLET, DELAYED RELEASE ORAL
Qty: 30 | Refills: 0
Start: 2019-10-18 | End: 2019-11-16

## 2019-10-18 RX ORDER — METFORMIN HYDROCHLORIDE 850 MG/1
1 TABLET ORAL
Qty: 60 | Refills: 0
Start: 2019-10-18 | End: 2019-11-16

## 2019-10-18 RX ORDER — INSULIN GLARGINE 100 [IU]/ML
28 INJECTION, SOLUTION SUBCUTANEOUS
Qty: 1 | Refills: 0
Start: 2019-10-18 | End: 2019-11-16

## 2019-10-18 RX ORDER — MECLIZINE HCL 12.5 MG
1 TABLET ORAL
Qty: 60 | Refills: 0
Start: 2019-10-18 | End: 2019-11-16

## 2019-10-18 RX ORDER — FERROUS SULFATE 325(65) MG
1 TABLET ORAL
Qty: 30 | Refills: 0
Start: 2019-10-18 | End: 2019-11-16

## 2019-10-18 RX ORDER — INSULIN LISPRO 100/ML
16 VIAL (ML) SUBCUTANEOUS
Qty: 1 | Refills: 0
Start: 2019-10-18 | End: 2019-11-16

## 2019-10-18 RX ORDER — ESCITALOPRAM OXALATE 10 MG/1
1 TABLET, FILM COATED ORAL
Qty: 0 | Refills: 0 | DISCHARGE

## 2019-10-18 RX ORDER — FUROSEMIDE 40 MG
1 TABLET ORAL
Qty: 30 | Refills: 0
Start: 2019-10-18 | End: 2019-11-16

## 2019-10-18 RX ORDER — POTASSIUM CHLORIDE 20 MEQ
1 PACKET (EA) ORAL
Qty: 30 | Refills: 0
Start: 2019-10-18 | End: 2019-11-16

## 2019-10-18 RX ADMIN — Medication 37.5 MILLIGRAM(S): at 06:00

## 2019-10-18 RX ADMIN — Medication 650 MILLIGRAM(S): at 16:44

## 2019-10-18 RX ADMIN — MAGNESIUM OXIDE 400 MG ORAL TABLET 800 MILLIGRAM(S): 241.3 TABLET ORAL at 08:49

## 2019-10-18 RX ADMIN — HEPARIN SODIUM 5000 UNIT(S): 5000 INJECTION INTRAVENOUS; SUBCUTANEOUS at 06:01

## 2019-10-18 RX ADMIN — PANTOPRAZOLE SODIUM 40 MILLIGRAM(S): 20 TABLET, DELAYED RELEASE ORAL at 06:02

## 2019-10-18 RX ADMIN — Medication 500 MILLIGRAM(S): at 11:29

## 2019-10-18 RX ADMIN — SODIUM CHLORIDE 3 MILLILITER(S): 9 INJECTION INTRAMUSCULAR; INTRAVENOUS; SUBCUTANEOUS at 06:04

## 2019-10-18 RX ADMIN — CLOPIDOGREL BISULFATE 75 MILLIGRAM(S): 75 TABLET, FILM COATED ORAL at 11:29

## 2019-10-18 RX ADMIN — Medication 400 MILLIGRAM(S): at 07:00

## 2019-10-18 RX ADMIN — Medication 325 MILLIGRAM(S): at 11:29

## 2019-10-18 RX ADMIN — Medication 100 MILLIGRAM(S): at 06:00

## 2019-10-18 RX ADMIN — Medication 20 MILLIGRAM(S): at 06:04

## 2019-10-18 RX ADMIN — Medication 37.5 MILLIGRAM(S): at 00:42

## 2019-10-18 RX ADMIN — SODIUM CHLORIDE 3 MILLILITER(S): 9 INJECTION INTRAMUSCULAR; INTRAVENOUS; SUBCUTANEOUS at 15:04

## 2019-10-18 RX ADMIN — Medication 81 MILLIGRAM(S): at 11:29

## 2019-10-18 RX ADMIN — ESCITALOPRAM OXALATE 5 MILLIGRAM(S): 10 TABLET, FILM COATED ORAL at 11:29

## 2019-10-18 RX ADMIN — Medication 37.5 MILLIGRAM(S): at 08:53

## 2019-10-18 RX ADMIN — Medication 100 MILLIGRAM(S): at 15:10

## 2019-10-18 RX ADMIN — HEPARIN SODIUM 5000 UNIT(S): 5000 INJECTION INTRAVENOUS; SUBCUTANEOUS at 15:10

## 2019-10-18 RX ADMIN — Medication 400 MILLIGRAM(S): at 06:00

## 2019-10-18 RX ADMIN — Medication 2: at 12:24

## 2019-10-18 RX ADMIN — Medication 20 MILLIEQUIVALENT(S): at 08:49

## 2019-10-18 RX ADMIN — Medication 400 MILLIGRAM(S): at 12:12

## 2019-10-18 RX ADMIN — Medication 16 UNIT(S): at 08:25

## 2019-10-18 RX ADMIN — Medication 400 MILLIGRAM(S): at 11:28

## 2019-10-18 RX ADMIN — Medication 16 UNIT(S): at 12:24

## 2019-10-18 NOTE — DISCHARGE NOTE PROVIDER - HOSPITAL COURSE
59 year old female with PMHx of DM, HTN, HLD, CAD s/p MIDCAB 9/27 and PCI x 2 10/2/19 presented to [facility] ED complaining of progressively worsening fever, chills, SOB and left sided pleuritic chest pain. She had a CT chest which revealed moderate pericardial effusion and left pleural effusion and was empirically started on IV antibiotics. She was transferred to [facility] on 10/15/19 for further evaluation, on admission bedside TTE revealed large pericardial effusion with signs of tamponade. She was taken urgently to  and underwent pericardial drain placement (200 cc) and left pigtail catheter placement (600 cc). During procedure patient became tachycardic to 250 treated with IV lopressor and fluids with improvement she was transferred to CT ICU post procedure for closer monitoring. She remained stable throughout the day and evening, repeat echocardiogram revealed small residual pericardial effusion and she was transferred to  on 10/16/19.  Her drains were removed on 10/17/19 and today, she is clinically stable to be discharged home.  Of note at [facility] she had blood cultures positive for gram positive rods, at [facility] repeat cultures are NGTD x 3 days, pericardial and pleural fluid no growth, and she has remained afebrile.  Discussed with Dr. Arenas and patient not to be started on antibiotics at this time, cultures likely a contaminate. 59 year old female with PMHx of DM, HTN, HLD, CAD s/p MIDCAB 9/27 and PCI x 2 10/2/19 presented to [facility] ED complaining of progressively worsening fever, chills, SOB and left sided pleuritic chest pain. She had a CT chest which revealed moderate pericardial effusion and left pleural effusion and was empirically started on IV antibiotics. She was transferred to [facility] on 10/15/19 for further evaluation, on admission bedside TTE revealed large pericardial effusion with signs of tamponade. She was taken urgently to  and underwent pericardial drain placement (200 cc) and left pigtail catheter placement (600 cc). During procedure patient became tachycardic to 250 treated with IV lopressor and fluids with improvement she was transferred to CT ICU post procedure for closer monitoring. She remained stable throughout the day and evening, repeat echocardiogram revealed small residual pericardial effusion and she was transferred to  on 10/16/19.  Her drains were removed on 10/17/19 and today, she is clinically stable to be discharged home.  Of note at [facility] she had blood cultures positive for gram positive rods, at [facility] repeat cultures are NGTD x 3 days, pericardial and pleural fluid no growth, and she has remained afebrile.  Discussed with CTICU intensivist and patient not to be started on antibiotics at this time, cultures likely a contaminate. 59 year old female with PMHx of DM, HTN, HLD, CAD s/p MIDCAB 9/27 and PCI x 2 10/2/19 presented to [facility] ED complaining of progressively worsening fever, chills, SOB and left sided pleuritic chest pain. She had a CT chest which revealed moderate pericardial effusion and left pleural effusion and was empirically started on IV antibiotics. She was transferred to [facility] on 10/15/19 for further evaluation, on admission bedside TTE revealed large pericardial effusion with signs of tamponade. She was taken urgently to  and underwent pericardial drain placement (200 cc) and left pigtail catheter placement (600 cc). During procedure patient became tachycardic to 250 treated with IV lopressor and fluids with improvement she was transferred to CT ICU post procedure for closer monitoring. She remained stable throughout the day and evening, repeat echocardiogram revealed small residual pericardial effusion and she was transferred to  on 10/16/19.  Her drains were removed on 10/17/19 and today, she is clinically stable to be discharged home.  Of note at [facility] she had blood cultures positive for gram positive rods, at [facility] repeat cultures are NGTD x 3 days, pericardial and pleural fluid no growth, and she has remained afebrile.  Discussed with CTICU intensivist and patient not to be started on antibiotics at this time, cultures likely a contaminate.  Also of note patient had some bleeding from pigtail site after chest tube was removed, hemostasis achieved with tie down stitch, stitch will be removed in the office at her follow up visit.

## 2019-10-18 NOTE — DISCHARGE NOTE NURSING/CASE MANAGEMENT/SOCIAL WORK - NSDCPEEMAIL_GEN_ALL_CORE
[facility] for Tobacco Control email [email or web address]

## 2019-10-18 NOTE — DISCHARGE NOTE PROVIDER - NSDCCPCAREPLAN_GEN_ALL_CORE_FT
PRINCIPAL DISCHARGE DIAGNOSIS  Diagnosis: Pericardial effusion  Assessment and Plan of Treatment: Pericardial effusion      SECONDARY DISCHARGE DIAGNOSES  Diagnosis: Pleural effusion on left  Assessment and Plan of Treatment: Pleural effusion on left

## 2019-10-18 NOTE — DISCHARGE NOTE NURSING/CASE MANAGEMENT/SOCIAL WORK - NSDCFUADDAPPT_GEN_ALL_CORE_FT
-Please follow up with Dr. Arenas on 10/29/19 at 10:30 am.  The office is located at [facility], [facility], 4th floor. Call us with any questions #[phone].    -Please follow up with Dr. Radha Palomo.  The office will call you with an appointment tomorrow.      -Please follow up at the endocrine clinic on 10/30/19 at 9:00am with Ayo De La Rosa NP.  Office is located at [address] [place] [address].  [phone].

## 2019-10-18 NOTE — DISCHARGE NOTE PROVIDER - NSDCFUADDAPPT_GEN_ALL_CORE_FT
-Please follow up with Dr. Arenas on ___.  The office is located at [facility], [facility], 4th floor. Call us with any questions #[phone].    Dr. Radha Palomo 2 weeks  Endo clinic 2 weeks [phone] -Please follow up with Dr. Arenas on 10/29/19 at 10:30 am.  The office is located at [facility], [facility], 4th floor. Call us with any questions #[phone].    -Please follow up with Dr. Radha Palomo.  The office will call you with an appointment tomorrow.      -Please follow up at the endocrine clinic on 10/30/19 at 9:00am with Ayo De La Rosa NP.  Office is located at [address] [place] [address].  [phone].

## 2019-10-18 NOTE — DISCHARGE NOTE PROVIDER - NSDCFUSCHEDAPPT_GEN_ALL_CORE_FT
ANDREINA PUGA ; 10/29/2019 ; NPP CT Surg [address] [facility] ; 10/29/2019 ; NPP CT Surg [address]  [facility] ; 10/30/2019 ; NPP Endocrin [address] [facility] ; 10/29/2019 ; NPP CT Surg [address]  [facility] ; 10/30/2019 ; NPP Endocrin [address]

## 2019-10-18 NOTE — DISCHARGE NOTE PROVIDER - CARE PROVIDER_API CALL
Franco Arenas (MD)  Cardiovascular Surgery  [address], 4th Floor  [place], [place] [address]  Phone: [phone]  Fax: [phone]  Follow Up Time:

## 2019-10-18 NOTE — DISCHARGE NOTE NURSING/CASE MANAGEMENT/SOCIAL WORK - PATIENT PORTAL LINK FT
You can access the FollowMyHealth Patient Portal offered by [facility] by registering at the following website: [email or web address]. By joining [facility]’s FollowMyHealth portal, you will also be able to view your health information using other applications (apps) compatible with our system.

## 2019-10-18 NOTE — DISCHARGE NOTE NURSING/CASE MANAGEMENT/SOCIAL WORK - NSDCPEWEB_GEN_ALL_CORE
[facility] for Tobacco Control website --- [email or web address] website --- [email or web address]

## 2019-10-18 NOTE — PROGRESS NOTE ADULT - ASSESSMENT
-Please follow up with Dr. Arenas on 10/29/19 at 10:30 am.  The office is located at [facility], [facility], 4th floor. Call us with any questions #[phone].    -Please follow up with Dr. Radha Palomo.  The office will call you with an appointment tomorrow.      -Please follow up at the endocrine clinic on 10/30/19 at 9:00am with Ayo De La Rosa NP.  Office is located at [address] [place] [address].  [phone].

## 2019-10-18 NOTE — PROGRESS NOTE ADULT - SUBJECTIVE AND OBJECTIVE BOX
Patient discussed on morning rounds with Dr. Arenas      Operation / Date: 9/27/19 MIDCAB readmitted 10/15/19 for pericardial and pleural effusion    Surgeon: Dr. Arenas     Referring Physician: Dr. Radha Palomo      [facility] Course: 59 year old female with PMHx of DM, HTN, HLD, CAD s/p MIDCAB 9/27 and PCI x 2 10/2/19 presented to [facility] ED complaining of progressively worsening fever, chills, SOB and left sided pleuritic chest pain. She had a CT chest which revealed moderate pericardial effusion and left pleural effusion and was empirically started on IV antibiotics. She was transferred to [facility] on 10/15/19 for further evaluation, on admission bedside TTE revealed large pericardial effusion with signs of tamponade. She was taken urgently to  and underwent pericardial drain placement (200 cc) and left pigtail catheter placement (600 cc). During procedure patient became tachycardic to 250 treated with IV lopressor and fluids with improvement she was transferred to CT ICU post procedure for closer monitoring. She remained stable throughout the day and evening, repeat echocardiogram revealed small residual pericardial effusion and she was transferred to  on 10/16/19.  Her drains were removed on 10/17/19 and today, she is clinically stable to be discharged home.  Of note at [facility] she had blood cultures positive for gram positive rods, at [facility] repeat cultures are NGTD x 3 days, pericardial and pleural fluid no growth, and she has remained afebrile.  Discussed with CTICU intensivist and patient not to be started on antibiotics at this time, cultures likely a contaminate.  Of note patient had some bleeding from pigtail site after chest tube was removed, hemostasis achieved with tie down stitch, stitch will be removed in the office at her follow up visit.     Vital Signs Last 24 Hrs  T(C): 36.2 (18 Oct 2019 05:01), Max: 36.8 (17 Oct 2019 17:42)  T(F): 97.2 (18 Oct 2019 05:01), Max: 98.2 (17 Oct 2019 17:42)  HR: 78 (18 Oct 2019 11:30) (74 - 87)  BP: 115/61 (18 Oct 2019 11:30) (115/61 - 150/74)  BP(mean): 93 (18 Oct 2019 11:30) (80 - 114)  RR: 15 (18 Oct 2019 11:30) (14 - 18)  SpO2: 96% (18 Oct 2019 11:30) (94% - 97%)  I&O's Detail    17 Oct 2019 07:01  -  18 Oct 2019 07:00  --------------------------------------------------------  IN:  Total IN: 0 mL    OUT:    Voided: 1000 mL  Total OUT: 1000 mL    Total NET: -1000 mL      18 Oct 2019 07:01  -  18 Oct 2019 13:49  --------------------------------------------------------  IN:    Oral Fluid: 360 mL  Total IN: 360 mL    OUT:  Total OUT: 0 mL    Total NET: 360 mL    EPICARDIAL WIRES REMOVED: Yes  TIE DOWNS REMOVED: NO.    PHYSICAL EXAM:    General: OOB in chair, comfortable, NAD    Neurological: A&O x 3 ART, no focal deficits    Cardiovascular: S1S2 RRR No M/G/R    Respiratory: CTA b/l No W/R/R    Gastrointestinal: soft, NT/ND    Extremities: No LE edema, no calf tenderness b/l    Vascular: distal pulses 2 + b/l    Incision Sites: left mini thoracotomy incisions healing well, no drainage, no erythema, no dehiscence.      LABS:                        8.9    7.59  )-----------( 437      ( 18 Oct 2019 05:53 )             29.7     COUMADIN:  No.     10-18    143  |  104  |  19  ----------------------------<  105<H>  3.9   |  28  |  0.66    Ca    9.2      18 Oct 2019 05:53  Mg     1.8     10-18      MEDICATIONS  (STANDING):  ascorbic acid 500 milliGRAM(s) Oral daily  aspirin enteric coated 81 milliGRAM(s) Oral daily  atorvastatin 20 milliGRAM(s) Oral at bedtime  clopidogrel Tablet 75 milliGRAM(s) Oral daily  dextrose 5%. 1000 milliLiter(s) (50 mL/Hr) IV Continuous <Continuous>  dextrose 50% Injectable 12.5 Gram(s) IV Push once  dextrose 50% Injectable 25 Gram(s) IV Push once  dextrose 50% Injectable 25 Gram(s) IV Push once  docusate sodium 100 milliGRAM(s) Oral three times a day  escitalopram 5 milliGRAM(s) Oral daily  ferrous    sulfate 325 milliGRAM(s) Oral daily  heparin  Injectable 5000 Unit(s) SubCutaneous every 8 hours  ibuprofen  Tablet. 400 milliGRAM(s) Oral every 6 hours  insulin glargine Injectable (LANTUS) 28 Unit(s) SubCutaneous at bedtime  insulin lispro (HumaLOG) corrective regimen sliding scale   SubCutaneous Before meals and at bedtime  insulin lispro Injectable (HumaLOG) 16 Unit(s) SubCutaneous three times a day before meals  metoprolol tartrate 75 milliGRAM(s) Oral every 12 hours  pantoprazole    Tablet 40 milliGRAM(s) Oral before breakfast  predniSONE   Tablet 20 milliGRAM(s) Oral daily  senna 2 Tablet(s) Oral at bedtime  sodium chloride 0.9% lock flush 3 milliLiter(s) IV Push every 8 hours      Discharge CXR:   < from: Xray Chest 2 Views PA/Lat (10.18.19 @ 09:14) >  Findings/  impression: Lung pathology, resolving. Stable heart size.  < end of copied text >      Discharge ECHO:  < from: TTE Echo Limited or F/U (10.16.19 @ 10:57) >   1. Normal left ventricular systolic function.   2. There is a small sized pericardial effusion without echocardiographic   evidence of cardiac tamponade. There is <30% respiratory variation in   mitral inflow. The inferior vena cava is dilated (>2.1cm) with abnormal   inspiratory collapse (<50%) consistent with elevated right atrial   pressure (  15, range 10-20mmHg). The pericardial effusion is greatly improved   compared to transthoracic echocardiogram done yesterday.  < end of copied text >

## 2019-10-20 LAB
CULTURE RESULTS: SIGNIFICANT CHANGE UP
SPECIMEN SOURCE: SIGNIFICANT CHANGE UP

## 2019-10-21 LAB
CULTURE RESULTS: NO GROWTH — SIGNIFICANT CHANGE UP
CULTURE RESULTS: NO GROWTH — SIGNIFICANT CHANGE UP
SPECIMEN SOURCE: SIGNIFICANT CHANGE UP
SPECIMEN SOURCE: SIGNIFICANT CHANGE UP

## 2019-10-22 DIAGNOSIS — I10 ESSENTIAL (PRIMARY) HYPERTENSION: ICD-10-CM

## 2019-10-22 DIAGNOSIS — J90 PLEURAL EFFUSION, NOT ELSEWHERE CLASSIFIED: ICD-10-CM

## 2019-10-22 DIAGNOSIS — E11.9 TYPE 2 DIABETES MELLITUS WITHOUT COMPLICATIONS: ICD-10-CM

## 2019-10-22 DIAGNOSIS — J98.11 ATELECTASIS: ICD-10-CM

## 2019-10-22 DIAGNOSIS — E78.00 PURE HYPERCHOLESTEROLEMIA, UNSPECIFIED: ICD-10-CM

## 2019-10-22 DIAGNOSIS — I31.3 PERICARDIAL EFFUSION (NONINFLAMMATORY): ICD-10-CM

## 2019-10-22 DIAGNOSIS — E78.5 HYPERLIPIDEMIA, UNSPECIFIED: ICD-10-CM

## 2019-10-22 DIAGNOSIS — Z98.61 CORONARY ANGIOPLASTY STATUS: ICD-10-CM

## 2019-10-22 DIAGNOSIS — Z95.1 PRESENCE OF AORTOCORONARY BYPASS GRAFT: ICD-10-CM

## 2019-10-22 DIAGNOSIS — I31.4 CARDIAC TAMPONADE: ICD-10-CM

## 2019-10-22 DIAGNOSIS — I25.10 ATHEROSCLEROTIC HEART DISEASE OF NATIVE CORONARY ARTERY WITHOUT ANGINA PECTORIS: ICD-10-CM

## 2019-10-22 DIAGNOSIS — Z79.82 LONG TERM (CURRENT) USE OF ASPIRIN: ICD-10-CM

## 2019-10-22 DIAGNOSIS — Z79.4 LONG TERM (CURRENT) USE OF INSULIN: ICD-10-CM

## 2019-10-24 RX ORDER — FUROSEMIDE 20 MG/1
20 TABLET ORAL DAILY
Qty: 30 | Refills: 0 | Status: ACTIVE | COMMUNITY

## 2019-10-24 RX ORDER — METOPROLOL TARTRATE 25 MG/1
25 TABLET, FILM COATED ORAL
Refills: 0 | Status: COMPLETED | COMMUNITY
End: 2019-10-24

## 2019-10-24 RX ORDER — POTASSIUM CHLORIDE 750 MG/1
10 TABLET, FILM COATED, EXTENDED RELEASE ORAL DAILY
Qty: 30 | Refills: 0 | Status: ACTIVE | COMMUNITY

## 2019-10-24 RX ORDER — INSULIN GLARGINE 100 [IU]/ML
100 INJECTION, SOLUTION SUBCUTANEOUS AT BEDTIME
Refills: 0 | Status: ACTIVE | COMMUNITY

## 2019-10-24 RX ORDER — INSULIN LISPRO 100 U/ML
100 INJECTION, SOLUTION SUBCUTANEOUS
Refills: 0 | Status: ACTIVE | COMMUNITY

## 2019-10-24 RX ORDER — PREDNISONE 10 MG
TABLET ORAL
Refills: 0 | Status: ACTIVE | COMMUNITY

## 2019-10-24 RX ORDER — MULTIVIT-MIN/FOLIC/VIT K/LYCOP 400-300MCG
500 TABLET ORAL DAILY
Refills: 0 | Status: ACTIVE | COMMUNITY

## 2019-10-24 RX ORDER — METOPROLOL TARTRATE 75 MG/1
75 TABLET, FILM COATED ORAL
Qty: 60 | Refills: 3 | Status: ACTIVE | COMMUNITY

## 2019-10-28 ENCOUNTER — FORM ENCOUNTER (OUTPATIENT)
Age: 59
End: 2019-10-28

## 2019-10-29 ENCOUNTER — APPOINTMENT (OUTPATIENT)
Dept: CARDIOTHORACIC SURGERY | Facility: CLINIC | Age: 59
End: 2019-10-29
Payer: MEDICAID

## 2019-10-29 ENCOUNTER — OUTPATIENT (OUTPATIENT)
Dept: OUTPATIENT SERVICES | Facility: HOSPITAL | Age: 59
LOS: 1 days | End: 2019-10-29
Payer: COMMERCIAL

## 2019-10-29 VITALS
HEIGHT: 64 IN | BODY MASS INDEX: 29.19 KG/M2 | RESPIRATION RATE: 18 BRPM | DIASTOLIC BLOOD PRESSURE: 73 MMHG | WEIGHT: 171 LBS | TEMPERATURE: 99.8 F | HEART RATE: 98 BPM | OXYGEN SATURATION: 96 % | SYSTOLIC BLOOD PRESSURE: 135 MMHG

## 2019-10-29 DIAGNOSIS — Z98.890 OTHER SPECIFIED POSTPROCEDURAL STATES: Chronic | ICD-10-CM

## 2019-10-29 DIAGNOSIS — Z95.1 PRESENCE OF AORTOCORONARY BYPASS GRAFT: Chronic | ICD-10-CM

## 2019-10-29 PROCEDURE — 71046 X-RAY EXAM CHEST 2 VIEWS: CPT

## 2019-10-29 PROCEDURE — 71046 X-RAY EXAM CHEST 2 VIEWS: CPT | Mod: 26

## 2019-10-29 PROCEDURE — 99024 POSTOP FOLLOW-UP VISIT: CPT

## 2019-10-30 ENCOUNTER — APPOINTMENT (OUTPATIENT)
Dept: ENDOCRINOLOGY | Facility: CLINIC | Age: 59
End: 2019-10-30

## 2019-10-31 PROCEDURE — 80048 BASIC METABOLIC PNL TOTAL CA: CPT

## 2019-10-31 PROCEDURE — 83735 ASSAY OF MAGNESIUM: CPT

## 2019-10-31 PROCEDURE — 97161 PT EVAL LOW COMPLEX 20 MIN: CPT

## 2019-10-31 PROCEDURE — C1769: CPT

## 2019-10-31 PROCEDURE — 93306 TTE W/DOPPLER COMPLETE: CPT

## 2019-10-31 PROCEDURE — 85730 THROMBOPLASTIN TIME PARTIAL: CPT

## 2019-10-31 PROCEDURE — 84100 ASSAY OF PHOSPHORUS: CPT

## 2019-10-31 PROCEDURE — 80061 LIPID PANEL: CPT

## 2019-10-31 PROCEDURE — 71046 X-RAY EXAM CHEST 2 VIEWS: CPT

## 2019-10-31 PROCEDURE — 84439 ASSAY OF FREE THYROXINE: CPT

## 2019-10-31 PROCEDURE — C1894: CPT

## 2019-10-31 PROCEDURE — 84443 ASSAY THYROID STIM HORMONE: CPT

## 2019-10-31 PROCEDURE — 83605 ASSAY OF LACTIC ACID: CPT

## 2019-10-31 PROCEDURE — 33010: CPT

## 2019-10-31 PROCEDURE — 82550 ASSAY OF CK (CPK): CPT

## 2019-10-31 PROCEDURE — 86901 BLOOD TYPING SEROLOGIC RH(D): CPT

## 2019-10-31 PROCEDURE — 82962 GLUCOSE BLOOD TEST: CPT

## 2019-10-31 PROCEDURE — C1725: CPT

## 2019-10-31 PROCEDURE — P9045: CPT

## 2019-10-31 PROCEDURE — 86850 RBC ANTIBODY SCREEN: CPT

## 2019-10-31 PROCEDURE — 93880 EXTRACRANIAL BILAT STUDY: CPT

## 2019-10-31 PROCEDURE — 94150 VITAL CAPACITY TEST: CPT

## 2019-10-31 PROCEDURE — 84132 ASSAY OF SERUM POTASSIUM: CPT

## 2019-10-31 PROCEDURE — 80053 COMPREHEN METABOLIC PANEL: CPT

## 2019-10-31 PROCEDURE — 85610 PROTHROMBIN TIME: CPT

## 2019-10-31 PROCEDURE — 85027 COMPLETE CBC AUTOMATED: CPT

## 2019-10-31 PROCEDURE — 84295 ASSAY OF SERUM SODIUM: CPT

## 2019-10-31 PROCEDURE — 93308 TTE F-UP OR LMTD: CPT

## 2019-10-31 PROCEDURE — 82803 BLOOD GASES ANY COMBINATION: CPT

## 2019-10-31 PROCEDURE — 36415 COLL VENOUS BLD VENIPUNCTURE: CPT

## 2019-10-31 PROCEDURE — 87205 SMEAR GRAM STAIN: CPT

## 2019-10-31 PROCEDURE — S2900: CPT

## 2019-10-31 PROCEDURE — 86923 COMPATIBILITY TEST ELECTRIC: CPT

## 2019-10-31 PROCEDURE — C9248: CPT

## 2019-10-31 PROCEDURE — 87040 BLOOD CULTURE FOR BACTERIA: CPT

## 2019-10-31 PROCEDURE — 99153 MOD SED SAME PHYS/QHP EA: CPT

## 2019-10-31 PROCEDURE — C1729: CPT

## 2019-10-31 PROCEDURE — 97116 GAIT TRAINING THERAPY: CPT

## 2019-10-31 PROCEDURE — C1887: CPT

## 2019-10-31 PROCEDURE — 93005 ELECTROCARDIOGRAM TRACING: CPT

## 2019-10-31 PROCEDURE — 87070 CULTURE OTHR SPECIMN AEROBIC: CPT

## 2019-10-31 PROCEDURE — 83036 HEMOGLOBIN GLYCOSYLATED A1C: CPT

## 2019-10-31 PROCEDURE — 83880 ASSAY OF NATRIURETIC PEPTIDE: CPT

## 2019-10-31 PROCEDURE — 71045 X-RAY EXAM CHEST 1 VIEW: CPT

## 2019-10-31 PROCEDURE — 86900 BLOOD TYPING SEROLOGIC ABO: CPT

## 2019-10-31 PROCEDURE — 32557 INSERT CATH PLEURA W/ IMAGE: CPT

## 2019-10-31 PROCEDURE — 84436 ASSAY OF TOTAL THYROXINE: CPT

## 2019-10-31 PROCEDURE — 97164 PT RE-EVAL EST PLAN CARE: CPT

## 2019-10-31 PROCEDURE — 85025 COMPLETE CBC W/AUTO DIFF WBC: CPT

## 2019-10-31 PROCEDURE — 86140 C-REACTIVE PROTEIN: CPT

## 2019-10-31 PROCEDURE — C1889: CPT

## 2019-10-31 PROCEDURE — 82330 ASSAY OF CALCIUM: CPT

## 2019-10-31 PROCEDURE — 99152 MOD SED SAME PHYS/QHP 5/>YRS: CPT

## 2019-10-31 PROCEDURE — 86803 HEPATITIS C AB TEST: CPT

## 2019-10-31 PROCEDURE — 76930: CPT

## 2019-10-31 PROCEDURE — 87075 CULTR BACTERIA EXCEPT BLOOD: CPT

## 2019-10-31 PROCEDURE — C1874: CPT

## 2020-09-17 DIAGNOSIS — Z01.818 ENCOUNTER FOR OTHER PREPROCEDURAL EXAMINATION: ICD-10-CM

## 2020-09-20 ENCOUNTER — APPOINTMENT (OUTPATIENT)
Dept: DISASTER EMERGENCY | Facility: CLINIC | Age: 60
End: 2020-09-20

## 2020-09-21 VITALS
WEIGHT: 184.97 LBS | RESPIRATION RATE: 16 BRPM | HEART RATE: 96 BPM | TEMPERATURE: 98 F | DIASTOLIC BLOOD PRESSURE: 70 MMHG | HEIGHT: 65 IN | SYSTOLIC BLOOD PRESSURE: 168 MMHG | OXYGEN SATURATION: 100 %

## 2020-09-21 LAB — SARS-COV-2 N GENE NPH QL NAA+PROBE: NOT DETECTED

## 2020-09-21 RX ORDER — CHLORHEXIDINE GLUCONATE 213 G/1000ML
1 SOLUTION TOPICAL ONCE
Refills: 0 | Status: DISCONTINUED | OUTPATIENT
Start: 2020-09-23 | End: 2020-09-24

## 2020-09-21 NOTE — H&P ADULT - HISTORY OF PRESENT ILLNESS
[place]  COVID: 9/20/20 NEGATIVE (Results in HIE)   Pharmacy:   Confirm meds  Cardiologist: Dr Palomo     Escort:     60 Y F with FHX CAD (brother PCI at 43yrs, sister PCI at 58 yrs, sister PCI at 53 yrs) pmh HTN, HLD, DM type II, carotid artery stenosis (s/p endarterectomy), CAD with CABG who presents to her cardiologist c/o CP described as _____ , ___/10  with associated SOB with walking________ blocks occurring x ______.     Pt denies orthopnea, PND, palpitations, LE edema, dizziness, syncope, claudication    NST 8/21/20: small- medium size reversible perfusion defect of moderate intensity in basal anterior/anterolateral walls. Medium size reversible perfusion defect of moderate intensity in mid-apical lateral/inferolateral walls. EF 71%. EKG with 2-3 downsloping T segment depression in inferolateral leads during stress which returned to baseline in mid recovery.       In light of risk factors, CCS angina class ___ symptoms, and abnormal CCTA pt presents for cardiac angiogram with possible intervention if clinically indicated.    History obtained from son Haddi, reliable     COVID: 9/20/20 NEGATIVE (Results in HIE)   Pharmacy: [facility] [id number]  Confirm meds  Cardiologist: Dr Palomo     Escort: Son      60 Y F with FHX CAD (brother PCI at 43yrs, sister PCI at 58 yrs, sister PCI at 53 yrs) pmh vertigo, HTN, HLD, DM type II, carotid artery stenosis (s/p endarterectomy), CAD with CABG (MIDCAB LIMA-LAD and PCI's to OM1, D1, D2 @ [facility] 10/2/19) who presents to her cardiologist c/o intermittent SS chest discomfort (unable to quantify severity)  with associated SOB, dizziness, diaphoresis and palpitations with climbing 5 steps occurring x 1 year (since she had her CABG).  Pt denies n/v/d, orthopnea, PND,  LE edema, syncope, claudication, melena, hematuria, recent sick contact, recent travel.    NST 8/21/20: small- medium size reversible perfusion defect of moderate intensity in basal anterior/anterolateral walls. Medium size reversible perfusion defect of moderate intensity in mid-apical lateral/inferolateral walls. EF 71%. EKG with 2-3 downsloping T segment depression in inferolateral leads during stress which returned to baseline in mid recovery.       In light of risk factors, CCS angina class III symptoms, and abnormal CCTA pt presents for cardiac angiogram with possible intervention if clinically indicated.       Cardiac cath 10/2/2019: LM nl, mLAD , pD1 90%, pD2 90%, pLCX luminal, OM1 80% at bifurcation 60 Y F with FHX CAD (brother PCI at 43yrs, sister PCI at 58 yrs, sister PCI at 53 yrs) pmh vertigo, HTN, HLD, DM type II, carotid artery stenosis (s/p endarterectomy), CAD with CABG (MIDCAB LIMA-LAD 9/27/19 and PCI's to OM1, D1, D2 @ [facility] 10/2/19 post op c/b representation to [facility] ED and tx to [facility] 10/15/19 for moderate pericardial effusion and L pleural effusion with evidence of tamponade on ECHO s/p urgent pericardial drain and left pigtail catheter with admit to CT ICU post procedure. Repeat echocardiogram with small residual pericardial effusion stepped down to 9L with subsequent drain removal 10/17/19 and discharged 10/18/19.     Pt presents to her cardiologist c/o intermittent SS chest discomfort (unable to quantify severity) with associated SOB, dizziness, diaphoresis and palpitations with climbing 5 steps occurring x 1 year (since she had her CABG with post op complications).  Pt denies n/v/d, orthopnea, PND,  LE edema, syncope, claudication, melena, hematuria, recent sick contact, recent travel.    NST 8/21/20: small- medium size reversible perfusion defect of moderate intensity in basal anterior/anterolateral walls. Medium size reversible perfusion defect of moderate intensity in mid-apical lateral/inferolateral walls. EF 71%. EKG with 2-3 downsloping T segment depression in inferolateral leads during stress which returned to baseline in mid recovery.       In light of risk factors, CCS angina class III symptoms, and abnormal CCTA pt presents for cardiac angiogram with possible intervention if clinically indicated.       Cardiac cath 10/2/2019: LM nl, mLAD , pD1 90%, pD2 90%, pLCX luminal, OM1 80% at bifurcation   60 Y F Vietnamese (Bhutanese dialect) with FHX CAD (brother PCI at 43yrs, sister PCI at 58 yrs, sister PCI at 53 yrs) pmh vertigo, HTN, HLD, DM type II, carotid artery stenosis (s/p endarterectomy), CAD with CABG (MIDCAB LIMA-LAD 9/27/19 and PCI's to OM1, D1, D2 @ [facility] 10/2/19 post op c/b representation to [facility] ED and tx to [facility] 10/15/19 for moderate pericardial effusion and L pleural effusion with evidence of tamponade on ECHO s/p urgent pericardial drain and left pigtail catheter with admit to CT ICU post procedure. Repeat echocardiogram with small residual pericardial effusion stepped down to 9L with subsequent drain removal 10/17/19 and discharged 10/18/19.     Pt presents to her cardiologist c/o intermittent SS chest discomfort (unable to quantify severity) with associated SOB, dizziness, diaphoresis and palpitations with climbing 5 steps occurring x 1 year (since she had her CABG with post op complications).  Pt denies n/v/d, orthopnea, PND,  LE edema, syncope, claudication, melena, hematuria, recent sick contact, recent travel.    NST 8/21/20: small- medium size reversible perfusion defect of moderate intensity in basal anterior/anterolateral walls. Medium size reversible perfusion defect of moderate intensity in mid-apical lateral/inferolateral walls. EF 71%. EKG with 2-3 downsloping T segment depression in inferolateral leads during stress which returned to baseline in mid recovery.       In light of risk factors, CCS angina class III symptoms, and abnormal CCTA pt presents for cardiac angiogram with possible intervention if clinically indicated.       Cardiac cath 10/2/2019: LM nl, mLAD , pD1 90%, pD2 90%, pLCX luminal, OM1 80% at bifurcation   COVID PCR negative, in HIE  Cardiologist: Dr. Palomo   Pharmacy: [facility] - [phone]  Escort: Son     61 y/o female, Mohawk speaking (East Timorese dialect), w/ FHX CAD (brother PCI at 43 yrs, sister PCI at 58 yrs, sister PCI at 53 yrs) and PMHx vertigo, HTN, HLD, DM type II, carotid artery stenosis (s/p endarterectomy), CAD with CABG (MIDCAB LIMA-LAD 9/27/19 and PCI's to OM1, D1, D2 @ [facility] 10/2/2019, post op c/b representation to [facility] ED and tx to [facility] 10/15/2019 for moderate pericardial effusion and L pleural effusion with evidence of tamponade on ECHO, s/p urgent pericardial drain and left pigtail catheter with admit to CT ICU post procedure, and repeat echocardiogram with small residual pericardial effusion stepped down to 9L with subsequent drain removal 10/17/19 and discharged 10/18/2019),  who presented to her cardiologist c/o intermittent SS chest discomfort (unable to quantify severity) with associated SOB, dizziness, diaphoresis and palpitations with climbing 5 steps occurring x 1 year (since she had her CABG with post op complications).  Patient denies N/V/D, orthopnea, PND,  LE edema, syncope, claudication, melena, hematuria, recent sick contact, recent travel. NST 8/21/2020 revealed small- medium size reversible perfusion defect of moderate intensity in basal anterior/anterolateral walls. Medium size reversible perfusion defect of moderate intensity in mid-apical lateral/inferolateral walls, EF 71%, and EKG with 2-3 downsloping T segment depression in inferolateral leads during stress which returned to baseline in mid recovery.     In light of risk factors, CCS angina class III symptoms, and abnormal NST, patient presents for cardiac angiogram with possible intervention if clinically indicated.       Cardiac cath 10/2/2019: LM nl, mLAD , pD1 90%, pD2 90%, pLCX luminal, OM1 80% at bifurcation

## 2020-09-21 NOTE — H&P ADULT - NSHPLABSRESULTS_GEN_ALL_CORE
CBC Full  -  ( 23 Sep 2020 10:19 )  WBC Count : 8.05 K/uL  RBC Count : 5.02 M/uL  Hemoglobin : 11.3 g/dL  Hematocrit : 37.4 %  Platelet Count - Automated : 227 K/uL  Mean Cell Volume : 74.5 fl  Mean Cell Hemoglobin : 22.5 pg  Mean Cell Hemoglobin Concentration : 30.2 gm/dL  Auto Neutrophil # : 5.44 K/uL  Auto Lymphocyte # : 1.27 K/uL  Auto Monocyte # : 0.21 K/uL  Auto Eosinophil # : 0.14 K/uL  Auto Basophil # : 0.14 K/uL  Auto Neutrophil % : 65.8 %  Auto Lymphocyte % : 15.8 %  Auto Monocyte % : 2.6 %  Auto Eosinophil % : 1.7 %  Auto Basophil % : 1.8 % CBC Full  -  ( 23 Sep 2020 10:19 )  WBC Count : 8.05 K/uL  RBC Count : 5.02 M/uL  Hemoglobin : 11.3 g/dL  Hematocrit : 37.4 %  Platelet Count - Automated : 227 K/uL  Mean Cell Volume : 74.5 fl  Mean Cell Hemoglobin : 22.5 pg  Mean Cell Hemoglobin Concentration : 30.2 gm/dL  Auto Neutrophil # : 5.44 K/uL  Auto Lymphocyte # : 1.27 K/uL  Auto Monocyte # : 0.21 K/uL  Auto Eosinophil # : 0.14 K/uL  Auto Basophil # : 0.14 K/uL  Auto Neutrophil % : 65.8 %  Auto Lymphocyte % : 15.8 %  Auto Monocyte % : 2.6 %  Auto Eosinophil % : 1.7 %  Auto Basophil % : 1.8 %    09-23    141  |  102  |  9   ----------------------------<  88  4.2   |  28  |  0.59    Ca    9.5      23 Sep 2020 10:19    TPro  6.8  /  Alb  4.1  /  TBili  0.2  /  DBili  x   /  AST  18  /  ALT  19  /  AlkPhos  66  09-23

## 2020-09-21 NOTE — H&P ADULT - NSICDXPASTMEDICALHX_GEN_ALL_CORE_FT
PAST MEDICAL HISTORY:  CAD (coronary artery disease) s/p CABG    History of carotid artery stenosis s/p carotid endarterectomy    HLD (hyperlipidemia)     HTN (hypertension)     Type 2 diabetes mellitus

## 2020-09-21 NOTE — H&P ADULT - ASSESSMENT
59 y/o female, Armenian speaking (Afghan dialect), w/ FHX CAD (brother PCI at 43 yrs, sister PCI at 58 yrs, sister PCI at 53 yrs) and PMHx vertigo, HTN, HLD, DM type II, carotid artery stenosis (s/p endarterectomy), CAD with CABG (MIDCAB LIMA-LAD 9/27/19 and PCI's to OM1, D1, D2 @ [facility] 10/2/2019, post op c/b representation to [facility] ED and tx to [facility] 10/15/2019 for moderate pericardial effusion and L pleural effusion with evidence of tamponade on ECHO, s/p urgent pericardial drain and left pigtail catheter with admit to CT ICU post procedure, and repeat echocardiogram with small residual pericardial effusion stepped down to 9L with subsequent drain removal 10/17/19 and discharged 10/18/2019),  who presents for cardiac catheterization w/ possible intervention if clinically indicated due to patient's risk factors, CCS class III anginal symptoms, and abnormal NST results.     EKG upon arrival showed sinus tachycardia at 100 bpm w/ evidence of LV hypertrophy. Labs upon arrival ...... Patient reports she took her Plavix 75 mg daily on 09/23/2020 AM but denies taking her Aspirin 81 mg daily on 09/23/2020. Patient states she last took Aspirin 81 mg daily on 09/22/2020. Patient was ordered for Aspirin 81 mg PO once. Patient euvolemic on exam and w/ a normal EF, and was ordered for NS 75 cc/hour x 4 hours.   					  ASA: III			Mallampati class: III	            Anginal Class: III    Sedation Plan: Moderate  Patient Is Suitable Candidate For Sedation: Yes    Risks & benefits of procedure and sedation and risks and benefits for the alternative therapy have been explained to the patient in layman’s terms including but not limited to: allergic reaction, bleeding, infection, arrhythmia, respiratory compromise, renal and vascular compromise, limb damage, MI, CVA, emergent CABG/Vascular Surgery and death. Informed consent obtained and in chart. 59 y/o female, Lithuanian speaking (Macedonian dialect), w/ FHX CAD (brother PCI at 43 yrs, sister PCI at 58 yrs, sister PCI at 53 yrs) and PMHx vertigo, HTN, HLD, DM type II, carotid artery stenosis (s/p endarterectomy), CAD with CABG (MIDCAB LIMA-LAD 9/27/19 and PCI's to OM1, D1, D2 @ [facility] 10/2/2019, post op c/b representation to [facility] ED and tx to [facility] 10/15/2019 for moderate pericardial effusion and L pleural effusion with evidence of tamponade on ECHO, s/p urgent pericardial drain and left pigtail catheter with admit to CT ICU post procedure, and repeat echocardiogram with small residual pericardial effusion stepped down to 9L with subsequent drain removal 10/17/19 and discharged 10/18/2019),  who presents for cardiac catheterization w/ possible intervention if clinically indicated due to patient's risk factors, CCS class III anginal symptoms, and abnormal NST results.     EKG upon arrival showed sinus tachycardia at 100 bpm w/ evidence of LV hypertrophy. Labs upon arrival within normal limits. Patient reports she took her Plavix 75 mg daily on 09/23/2020 AM but denies taking her Aspirin 81 mg daily on 09/23/2020. Patient states she last took Aspirin 81 mg daily on 09/22/2020. Patient was ordered for Aspirin 81 mg PO once. Patient euvolemic on exam and w/ a normal EF, and was ordered for NS 75 cc/hour x 4 hours.   					  ASA: III			Mallampati class: III	            Anginal Class: III    Sedation Plan: Moderate  Patient Is Suitable Candidate For Sedation: Yes    Risks & benefits of procedure and sedation and risks and benefits for the alternative therapy have been explained to the patient in layman’s terms including but not limited to: allergic reaction, bleeding, infection, arrhythmia, respiratory compromise, renal and vascular compromise, limb damage, MI, CVA, emergent CABG/Vascular Surgery and death. Informed consent obtained and in chart.

## 2020-09-23 ENCOUNTER — INPATIENT (INPATIENT)
Facility: HOSPITAL | Age: 60
LOS: 0 days | Discharge: HOME | DRG: 247 | End: 2020-09-24
Attending: INTERNAL MEDICINE | Admitting: INTERNAL MEDICINE
Payer: COMMERCIAL

## 2020-09-23 DIAGNOSIS — Z98.890 OTHER SPECIFIED POSTPROCEDURAL STATES: Chronic | ICD-10-CM

## 2020-09-23 DIAGNOSIS — Z95.1 PRESENCE OF AORTOCORONARY BYPASS GRAFT: Chronic | ICD-10-CM

## 2020-09-23 LAB
A1C WITH ESTIMATED AVERAGE GLUCOSE RESULT: 7.8 % — HIGH (ref 4–5.6)
ALBUMIN SERPL ELPH-MCNC: 4.1 G/DL — SIGNIFICANT CHANGE UP (ref 3.3–5)
ALP SERPL-CCNC: 66 U/L — SIGNIFICANT CHANGE UP (ref 40–120)
ALT FLD-CCNC: 19 U/L — SIGNIFICANT CHANGE UP (ref 10–45)
ANION GAP SERPL CALC-SCNC: 11 MMOL/L — SIGNIFICANT CHANGE UP (ref 5–17)
ANISOCYTOSIS BLD QL: SLIGHT — SIGNIFICANT CHANGE UP
APTT BLD: 33.1 SEC — SIGNIFICANT CHANGE UP (ref 27.5–35.5)
AST SERPL-CCNC: 18 U/L — SIGNIFICANT CHANGE UP (ref 10–40)
BASOPHILS # BLD AUTO: 0.14 K/UL — SIGNIFICANT CHANGE UP (ref 0–0.2)
BASOPHILS NFR BLD AUTO: 1.8 % — SIGNIFICANT CHANGE UP (ref 0–2)
BILIRUB SERPL-MCNC: 0.2 MG/DL — SIGNIFICANT CHANGE UP (ref 0.2–1.2)
BUN SERPL-MCNC: 9 MG/DL — SIGNIFICANT CHANGE UP (ref 7–23)
CALCIUM SERPL-MCNC: 9.5 MG/DL — SIGNIFICANT CHANGE UP (ref 8.4–10.5)
CHLORIDE SERPL-SCNC: 102 MMOL/L — SIGNIFICANT CHANGE UP (ref 96–108)
CHOLEST SERPL-MCNC: 108 MG/DL — SIGNIFICANT CHANGE UP (ref 10–199)
CK MB CFR SERPL CALC: 2.1 NG/ML — SIGNIFICANT CHANGE UP (ref 0–6.7)
CK SERPL-CCNC: 85 U/L — SIGNIFICANT CHANGE UP (ref 25–170)
CO2 SERPL-SCNC: 28 MMOL/L — SIGNIFICANT CHANGE UP (ref 22–31)
CREAT SERPL-MCNC: 0.59 MG/DL — SIGNIFICANT CHANGE UP (ref 0.5–1.3)
EOSINOPHIL # BLD AUTO: 0.14 K/UL — SIGNIFICANT CHANGE UP (ref 0–0.5)
EOSINOPHIL NFR BLD AUTO: 1.7 % — SIGNIFICANT CHANGE UP (ref 0–6)
ESTIMATED AVERAGE GLUCOSE: 177 MG/DL — HIGH (ref 68–114)
GIANT PLATELETS BLD QL SMEAR: PRESENT — SIGNIFICANT CHANGE UP
GLUCOSE BLDC GLUCOMTR-MCNC: 127 MG/DL — HIGH (ref 70–99)
GLUCOSE BLDC GLUCOMTR-MCNC: 198 MG/DL — HIGH (ref 70–99)
GLUCOSE SERPL-MCNC: 88 MG/DL — SIGNIFICANT CHANGE UP (ref 70–99)
HCT VFR BLD CALC: 37.4 % — SIGNIFICANT CHANGE UP (ref 34.5–45)
HDLC SERPL-MCNC: 32 MG/DL — LOW
HGB BLD-MCNC: 11.3 G/DL — LOW (ref 11.5–15.5)
HYPOCHROMIA BLD QL: SLIGHT — SIGNIFICANT CHANGE UP
INR BLD: 0.92 — SIGNIFICANT CHANGE UP (ref 0.88–1.16)
LIPID PNL WITH DIRECT LDL SERPL: 41 MG/DL — SIGNIFICANT CHANGE UP
LYMPHOCYTES # BLD AUTO: 1.27 K/UL — SIGNIFICANT CHANGE UP (ref 1–3.3)
LYMPHOCYTES # BLD AUTO: 15.8 % — SIGNIFICANT CHANGE UP (ref 13–44)
MANUAL SMEAR VERIFICATION: SIGNIFICANT CHANGE UP
MCHC RBC-ENTMCNC: 22.5 PG — LOW (ref 27–34)
MCHC RBC-ENTMCNC: 30.2 GM/DL — LOW (ref 32–36)
MCV RBC AUTO: 74.5 FL — LOW (ref 80–100)
MICROCYTES BLD QL: SLIGHT — SIGNIFICANT CHANGE UP
MONOCYTES # BLD AUTO: 0.21 K/UL — SIGNIFICANT CHANGE UP (ref 0–0.9)
MONOCYTES NFR BLD AUTO: 2.6 % — SIGNIFICANT CHANGE UP (ref 2–14)
NEUTROPHILS # BLD AUTO: 5.44 K/UL — SIGNIFICANT CHANGE UP (ref 1.8–7.4)
NEUTROPHILS NFR BLD AUTO: 65.8 % — SIGNIFICANT CHANGE UP (ref 43–77)
NEUTS BAND # BLD: 1.8 % — SIGNIFICANT CHANGE UP (ref 0–8)
OVALOCYTES BLD QL SMEAR: SLIGHT — SIGNIFICANT CHANGE UP
PLAT MORPH BLD: ABNORMAL
PLATELET # BLD AUTO: 227 K/UL — SIGNIFICANT CHANGE UP (ref 150–400)
POIKILOCYTOSIS BLD QL AUTO: SLIGHT — SIGNIFICANT CHANGE UP
POLYCHROMASIA BLD QL SMEAR: SLIGHT — SIGNIFICANT CHANGE UP
POTASSIUM SERPL-MCNC: 4.2 MMOL/L — SIGNIFICANT CHANGE UP (ref 3.5–5.3)
POTASSIUM SERPL-SCNC: 4.2 MMOL/L — SIGNIFICANT CHANGE UP (ref 3.5–5.3)
PROT SERPL-MCNC: 6.8 G/DL — SIGNIFICANT CHANGE UP (ref 6–8.3)
PROTHROM AB SERPL-ACNC: 11.1 SEC — SIGNIFICANT CHANGE UP (ref 10.6–13.6)
RBC # BLD: 5.02 M/UL — SIGNIFICANT CHANGE UP (ref 3.8–5.2)
RBC # FLD: 14.4 % — SIGNIFICANT CHANGE UP (ref 10.3–14.5)
RBC BLD AUTO: ABNORMAL
SODIUM SERPL-SCNC: 141 MMOL/L — SIGNIFICANT CHANGE UP (ref 135–145)
STOMATOCYTES BLD QL SMEAR: SLIGHT — SIGNIFICANT CHANGE UP
TOTAL CHOLESTEROL/HDL RATIO MEASUREMENT: 3.4 RATIO — SIGNIFICANT CHANGE UP (ref 3.3–7.1)
TRIGL SERPL-MCNC: 173 MG/DL — HIGH (ref 10–149)
VARIANT LYMPHS # BLD: 10.5 % — HIGH (ref 0–6)
WBC # BLD: 8.05 K/UL — SIGNIFICANT CHANGE UP (ref 3.8–10.5)
WBC # FLD AUTO: 8.05 K/UL — SIGNIFICANT CHANGE UP (ref 3.8–10.5)

## 2020-09-23 PROCEDURE — 93010 ELECTROCARDIOGRAM REPORT: CPT | Mod: 76

## 2020-09-23 RX ORDER — CLOPIDOGREL BISULFATE 75 MG/1
75 TABLET, FILM COATED ORAL DAILY
Refills: 0 | Status: DISCONTINUED | OUTPATIENT
Start: 2020-09-24 | End: 2020-09-24

## 2020-09-23 RX ORDER — ASPIRIN/CALCIUM CARB/MAGNESIUM 324 MG
81 TABLET ORAL ONCE
Refills: 0 | Status: COMPLETED | OUTPATIENT
Start: 2020-09-23 | End: 2020-09-23

## 2020-09-23 RX ORDER — ACETAMINOPHEN 500 MG
650 TABLET ORAL ONCE
Refills: 0 | Status: COMPLETED | OUTPATIENT
Start: 2020-09-23 | End: 2020-09-23

## 2020-09-23 RX ORDER — INSULIN GLARGINE 100 [IU]/ML
24 INJECTION, SOLUTION SUBCUTANEOUS AT BEDTIME
Refills: 0 | Status: DISCONTINUED | OUTPATIENT
Start: 2020-09-23 | End: 2020-09-24

## 2020-09-23 RX ORDER — DEXTROSE 50 % IN WATER 50 %
12.5 SYRINGE (ML) INTRAVENOUS ONCE
Refills: 0 | Status: DISCONTINUED | OUTPATIENT
Start: 2020-09-23 | End: 2020-09-24

## 2020-09-23 RX ORDER — SODIUM CHLORIDE 9 MG/ML
1000 INJECTION, SOLUTION INTRAVENOUS
Refills: 0 | Status: DISCONTINUED | OUTPATIENT
Start: 2020-09-23 | End: 2020-09-24

## 2020-09-23 RX ORDER — ATORVASTATIN CALCIUM 80 MG/1
80 TABLET, FILM COATED ORAL AT BEDTIME
Refills: 0 | Status: DISCONTINUED | OUTPATIENT
Start: 2020-09-23 | End: 2020-09-24

## 2020-09-23 RX ORDER — INSULIN LISPRO 100/ML
16 VIAL (ML) SUBCUTANEOUS
Qty: 0 | Refills: 0 | DISCHARGE

## 2020-09-23 RX ORDER — MECLIZINE HCL 12.5 MG
1 TABLET ORAL
Qty: 0 | Refills: 0 | DISCHARGE

## 2020-09-23 RX ORDER — ROSUVASTATIN CALCIUM 5 MG/1
1 TABLET ORAL
Qty: 0 | Refills: 0 | DISCHARGE

## 2020-09-23 RX ORDER — LISINOPRIL 2.5 MG/1
1 TABLET ORAL
Qty: 0 | Refills: 0 | DISCHARGE

## 2020-09-23 RX ORDER — SODIUM CHLORIDE 9 MG/ML
500 INJECTION INTRAMUSCULAR; INTRAVENOUS; SUBCUTANEOUS
Refills: 0 | Status: DISCONTINUED | OUTPATIENT
Start: 2020-09-23 | End: 2020-09-23

## 2020-09-23 RX ORDER — DEXTROSE 50 % IN WATER 50 %
25 SYRINGE (ML) INTRAVENOUS ONCE
Refills: 0 | Status: DISCONTINUED | OUTPATIENT
Start: 2020-09-23 | End: 2020-09-24

## 2020-09-23 RX ORDER — ASPIRIN/CALCIUM CARB/MAGNESIUM 324 MG
81 TABLET ORAL DAILY
Refills: 0 | Status: DISCONTINUED | OUTPATIENT
Start: 2020-09-24 | End: 2020-09-24

## 2020-09-23 RX ORDER — SODIUM CHLORIDE 9 MG/ML
500 INJECTION INTRAMUSCULAR; INTRAVENOUS; SUBCUTANEOUS
Refills: 0 | Status: DISCONTINUED | OUTPATIENT
Start: 2020-09-23 | End: 2020-09-24

## 2020-09-23 RX ORDER — DEXTROSE 50 % IN WATER 50 %
15 SYRINGE (ML) INTRAVENOUS ONCE
Refills: 0 | Status: DISCONTINUED | OUTPATIENT
Start: 2020-09-23 | End: 2020-09-24

## 2020-09-23 RX ORDER — MECLIZINE HCL 12.5 MG
25 TABLET ORAL
Refills: 0 | Status: DISCONTINUED | OUTPATIENT
Start: 2020-09-23 | End: 2020-09-24

## 2020-09-23 RX ORDER — METOPROLOL TARTRATE 50 MG
1 TABLET ORAL
Qty: 0 | Refills: 0 | DISCHARGE

## 2020-09-23 RX ORDER — LISINOPRIL 2.5 MG/1
20 TABLET ORAL DAILY
Refills: 0 | Status: DISCONTINUED | OUTPATIENT
Start: 2020-09-24 | End: 2020-09-24

## 2020-09-23 RX ORDER — INSULIN GLARGINE 100 [IU]/ML
28 INJECTION, SOLUTION SUBCUTANEOUS
Qty: 0 | Refills: 0 | DISCHARGE

## 2020-09-23 RX ORDER — GLUCAGON INJECTION, SOLUTION 0.5 MG/.1ML
1 INJECTION, SOLUTION SUBCUTANEOUS ONCE
Refills: 0 | Status: DISCONTINUED | OUTPATIENT
Start: 2020-09-23 | End: 2020-09-24

## 2020-09-23 RX ORDER — INFLUENZA VIRUS VACCINE 15; 15; 15; 15 UG/.5ML; UG/.5ML; UG/.5ML; UG/.5ML
0.5 SUSPENSION INTRAMUSCULAR ONCE
Refills: 0 | Status: COMPLETED | OUTPATIENT
Start: 2020-09-23 | End: 2020-09-24

## 2020-09-23 RX ORDER — METOPROLOL TARTRATE 50 MG
100 TABLET ORAL DAILY
Refills: 0 | Status: DISCONTINUED | OUTPATIENT
Start: 2020-09-23 | End: 2020-09-24

## 2020-09-23 RX ORDER — INSULIN LISPRO 100/ML
VIAL (ML) SUBCUTANEOUS
Refills: 0 | Status: DISCONTINUED | OUTPATIENT
Start: 2020-09-23 | End: 2020-09-24

## 2020-09-23 RX ORDER — INSULIN LISPRO 100/ML
12 VIAL (ML) SUBCUTANEOUS
Refills: 0 | Status: DISCONTINUED | OUTPATIENT
Start: 2020-09-23 | End: 2020-09-24

## 2020-09-23 RX ADMIN — Medication 81 MILLIGRAM(S): at 11:04

## 2020-09-23 RX ADMIN — Medication 2: at 22:24

## 2020-09-23 RX ADMIN — Medication 12 UNIT(S): at 17:53

## 2020-09-23 RX ADMIN — ATORVASTATIN CALCIUM 80 MILLIGRAM(S): 80 TABLET, FILM COATED ORAL at 21:40

## 2020-09-23 RX ADMIN — SODIUM CHLORIDE 75 MILLILITER(S): 9 INJECTION INTRAMUSCULAR; INTRAVENOUS; SUBCUTANEOUS at 13:32

## 2020-09-23 RX ADMIN — Medication 25 MILLIGRAM(S): at 21:40

## 2020-09-23 RX ADMIN — INSULIN GLARGINE 24 UNIT(S): 100 INJECTION, SOLUTION SUBCUTANEOUS at 22:25

## 2020-09-23 RX ADMIN — SODIUM CHLORIDE 75 MILLILITER(S): 9 INJECTION INTRAMUSCULAR; INTRAVENOUS; SUBCUTANEOUS at 11:04

## 2020-09-23 RX ADMIN — Medication 650 MILLIGRAM(S): at 20:58

## 2020-09-23 RX ADMIN — Medication 650 MILLIGRAM(S): at 21:20

## 2020-09-24 ENCOUNTER — TRANSCRIPTION ENCOUNTER (OUTPATIENT)
Age: 60
End: 2020-09-24

## 2020-09-24 VITALS
OXYGEN SATURATION: 95 % | HEART RATE: 92 BPM | RESPIRATION RATE: 18 BRPM | DIASTOLIC BLOOD PRESSURE: 59 MMHG | SYSTOLIC BLOOD PRESSURE: 122 MMHG

## 2020-09-24 LAB
ANION GAP SERPL CALC-SCNC: 13 MMOL/L — SIGNIFICANT CHANGE UP (ref 5–17)
BUN SERPL-MCNC: 10 MG/DL — SIGNIFICANT CHANGE UP (ref 7–23)
CALCIUM SERPL-MCNC: 9.2 MG/DL — SIGNIFICANT CHANGE UP (ref 8.4–10.5)
CHLORIDE SERPL-SCNC: 101 MMOL/L — SIGNIFICANT CHANGE UP (ref 96–108)
CO2 SERPL-SCNC: 26 MMOL/L — SIGNIFICANT CHANGE UP (ref 22–31)
CREAT SERPL-MCNC: 0.64 MG/DL — SIGNIFICANT CHANGE UP (ref 0.5–1.3)
GLUCOSE BLDC GLUCOMTR-MCNC: 172 MG/DL — HIGH (ref 70–99)
GLUCOSE SERPL-MCNC: 182 MG/DL — HIGH (ref 70–99)
HCT VFR BLD CALC: 36.6 % — SIGNIFICANT CHANGE UP (ref 34.5–45)
HGB BLD-MCNC: 11.2 G/DL — LOW (ref 11.5–15.5)
MAGNESIUM SERPL-MCNC: 1.8 MG/DL — SIGNIFICANT CHANGE UP (ref 1.6–2.6)
MCHC RBC-ENTMCNC: 22.6 PG — LOW (ref 27–34)
MCHC RBC-ENTMCNC: 30.6 GM/DL — LOW (ref 32–36)
MCV RBC AUTO: 73.9 FL — LOW (ref 80–100)
NRBC # BLD: 0 /100 WBCS — SIGNIFICANT CHANGE UP (ref 0–0)
PLATELET # BLD AUTO: 252 K/UL — SIGNIFICANT CHANGE UP (ref 150–400)
POTASSIUM SERPL-MCNC: 4.3 MMOL/L — SIGNIFICANT CHANGE UP (ref 3.5–5.3)
POTASSIUM SERPL-SCNC: 4.3 MMOL/L — SIGNIFICANT CHANGE UP (ref 3.5–5.3)
RBC # BLD: 4.95 M/UL — SIGNIFICANT CHANGE UP (ref 3.8–5.2)
RBC # FLD: 14.5 % — SIGNIFICANT CHANGE UP (ref 10.3–14.5)
SODIUM SERPL-SCNC: 140 MMOL/L — SIGNIFICANT CHANGE UP (ref 135–145)
WBC # BLD: 9.33 K/UL — SIGNIFICANT CHANGE UP (ref 3.8–10.5)
WBC # FLD AUTO: 9.33 K/UL — SIGNIFICANT CHANGE UP (ref 3.8–10.5)

## 2020-09-24 PROCEDURE — 99239 HOSP IP/OBS DSCHRG MGMT >30: CPT

## 2020-09-24 RX ORDER — CLOPIDOGREL BISULFATE 75 MG/1
1 TABLET, FILM COATED ORAL
Qty: 0 | Refills: 0 | DISCHARGE

## 2020-09-24 RX ORDER — CLOPIDOGREL BISULFATE 75 MG/1
1 TABLET, FILM COATED ORAL
Qty: 30 | Refills: 11
Start: 2020-09-24 | End: 2021-09-18

## 2020-09-24 RX ORDER — METFORMIN HYDROCHLORIDE 850 MG/1
1 TABLET ORAL
Qty: 0 | Refills: 0 | DISCHARGE

## 2020-09-24 RX ORDER — ASPIRIN/CALCIUM CARB/MAGNESIUM 324 MG
1 TABLET ORAL
Qty: 30 | Refills: 11
Start: 2020-09-24 | End: 2021-09-18

## 2020-09-24 RX ORDER — ASPIRIN/CALCIUM CARB/MAGNESIUM 324 MG
1 TABLET ORAL
Qty: 0 | Refills: 0 | DISCHARGE

## 2020-09-24 RX ADMIN — INFLUENZA VIRUS VACCINE 0.5 MILLILITER(S): 15; 15; 15; 15 SUSPENSION INTRAMUSCULAR at 10:49

## 2020-09-24 RX ADMIN — CLOPIDOGREL BISULFATE 75 MILLIGRAM(S): 75 TABLET, FILM COATED ORAL at 10:48

## 2020-09-24 RX ADMIN — Medication 12 UNIT(S): at 06:42

## 2020-09-24 RX ADMIN — Medication 81 MILLIGRAM(S): at 10:49

## 2020-09-24 RX ADMIN — Medication 2: at 06:42

## 2020-09-24 RX ADMIN — Medication 100 MILLIGRAM(S): at 05:43

## 2020-09-24 NOTE — DISCHARGE NOTE PROVIDER - NSDCMRMEDTOKEN_GEN_ALL_CORE_FT
Admelog 100 units/mL injectable solution: 16 unit(s) injectable 3 times a day (with meals)  Aspirin Enteric Coated 81 mg oral delayed release tablet: 1 tab(s) orally once a day  Basaglar KwikPen 100 units/mL subcutaneous solution: 28 unit(s) subcutaneous once a day (at bedtime)  clopidogrel 75 mg oral tablet: 1 tab(s) orally once a day  lisinopril 20 mg oral tablet: 1 tab(s) orally once a day  meclizine 25 mg oral tablet: 1 tab(s) orally 2 times a day, As Needed  metFORMIN 1000 mg oral tablet: 1 tab(s) orally 2 times a day  Metoprolol Succinate  mg oral tablet, extended release: 1 tab(s) orally once a day  rosuvastatin 40 mg oral tablet: 1 tab(s) orally once a day   Admelog 100 units/mL injectable solution: 16 unit(s) injectable 3 times a day (with meals)  Aspirin Enteric Coated 81 mg oral delayed release tablet: 1 tab(s) orally once a day  Basaglar KwikPen 100 units/mL subcutaneous solution: 28 unit(s) subcutaneous once a day (at bedtime)  clopidogrel 75 mg oral tablet: 1 tab(s) orally once a day  lisinopril 20 mg oral tablet: 1 tab(s) orally once a day  meclizine 25 mg oral tablet: 1 tab(s) orally 2 times a day, As Needed  metFORMIN 1000 mg oral tablet: 1 tab(s) orally 2 times a day *RESTART ON 9/26/20*  Metoprolol Succinate  mg oral tablet, extended release: 1 tab(s) orally once a day  rosuvastatin 40 mg oral tablet: 1 tab(s) orally once a day

## 2020-09-24 NOTE — DISCHARGE NOTE PROVIDER - CARE PROVIDER_API CALL
Radha Palomo  CARDIOVASCULAR DISEASE  [facility] [place] [address]  Phone: [phone]  Fax: [phone]  Follow Up Time: 1 week

## 2020-09-24 NOTE — DISCHARGE NOTE NURSING/CASE MANAGEMENT/SOCIAL WORK - PATIENT PORTAL LINK FT
You can access the FollowMyHealth Patient Portal offered by [facility] by registering at the following website: [email or web address]. By joining [employer]’s FollowMyHealth portal, you will also be able to view your health information using other applications (apps) compatible with our system.

## 2020-09-24 NOTE — DISCHARGE NOTE PROVIDER - NSDCCPCAREPLAN_GEN_ALL_CORE_FT
PRINCIPAL DISCHARGE DIAGNOSIS  Diagnosis: CAD (coronary artery disease)  Assessment and Plan of Treatment: You were found to have blockages in the arteries of your heart, also known as Coronary Artery Diseease. You underwent a cardiac angiogram on 9/23/20 and received 2 stents to the right coronary artery, a stent to Left Circlumflex artery and a stent to the Diagonal branch. PLEASE CONTINUE ASPIRIN 81MG DAILY AND PLAVIX 75MG DAILY. DO NOT STOP THESE MEDICATIONS FOR ANY REASON AS THEY ARE KEEPING YOUR STENT OPEN AND PREVENTING A HEART ATTACK.   Avoid strenuous activity or heavy lifting anything more than 5lbs for the next five days. Do not take a bath or swim for the next five days; you may shower. For any bleeding or hematoma formation (hardened blood collection under the skin) at the access site of your right groin please hold pressure and go to the emergency room. Please follow up with Dr. Palomo in 1-2 weeks. For recurrent chest pain, please call your doctor or go to the emergency room.      SECONDARY DISCHARGE DIAGNOSES  Diagnosis: HTN (hypertension)  Assessment and Plan of Treatment: Please continue your medications as listed to keep your blood pressure controlled. For blood pressure that is too high or too low please see your doctor or go to the emergency room as necessary.    Diagnosis: HLD (hyperlipidemia)  Assessment and Plan of Treatment: Please continue Rosuvastatin 40mg at bedtime to keep your cholesterol low. High cholesterol contributes to heart disease.    Diagnosis: DM (diabetes mellitus)  Assessment and Plan of Treatment: Your Hemoglobin A1c is 7.8% and your goal A1c is less than 7.0%. This number measures your average blood sugar level over the last three months. Please HOLD METFORMIN FOR 2 DAYS AFTER PROCEDURE AND RESTART 9/26/20 and continue your other medications as listed for diabetes. Maintain a low carbohydrate, low sugar diet, exercise, monitor your fingerstick blood sugars regarly and follow up with your Endocrinologist/Primary Care Doctor.

## 2020-09-24 NOTE — DISCHARGE NOTE PROVIDER - HOSPITAL COURSE
61 y/o female, Wolof speaking (Guatemalan dialect), w/ FHX CAD (brother PCI at 43 yrs, sister PCI at 58 yrs, sister PCI at 53 yrs) and PMHx vertigo, HTN, HLD, DM type II, carotid artery stenosis (s/p endarterectomy), CAD with CABG (MIDCAB LIMA-LAD 9/27/19 and PCI's to OM1, D1, D2 @ [facility] 10/2/2019, post op c/b representation to [facility] ED and tx to [facility] 10/15/2019 for moderate pericardial effusion and L pleural effusion with evidence of tamponade on ECHO, s/p urgent pericardial drain and left pigtail catheter with admit to CT ICU post procedure, and repeat echocardiogram with small residual pericardial effusion stepped down to 9L with subsequent drain removal 10/17/19 and discharged 10/18/2019),  who presented to her cardiologist c/o intermittent SS chest discomfort (unable to quantify severity) with associated SOB, dizziness, diaphoresis and palpitations with climbing 5 steps occurring x 1 year (since she had her CABG with post op complications).  Patient denies N/V/D, orthopnea, PND,  LE edema, syncope, claudication, melena, hematuria, recent sick contact, recent travel. NST 8/21/2020 revealed small- medium size reversible perfusion defect of moderate intensity in basal anterior/anterolateral walls. Medium size reversible perfusion defect of moderate intensity in mid-apical lateral/inferolateral walls, EF 71%, and EKG with 2-3 downsloping T segment depression in inferolateral leads during stress which returned to baseline in mid recovery.  In light of risk factors, CCS angina class III symptoms, and abnormal NST, patient presents for cardiac angiogram with possible intervention if clinically indicated.   Pt now s/p cardiac cath 9/23/20 receiving GLORIA pRCA, GOLRIA mRCA, PTCA mLCx ISR, GLORIA D1 ISR; mLAD  that fills via patent LIMA-LAD, access R groin AS. Pt seen and examined at bedside this AM without any complaints or events overnight, access site stable non TTP, without ecchymosis, bleeding or hematoma, pulse 2+ no bruits. VSS, labs and telemetry reviewed and pt stable for discharge as discussed with Dr. Hendrickson. Pt has received appropriate discharge instructions, including medication regimen, access site management and follow up with Dr. Palomo in 1-2 weeks.  Discharge medication regimen has been reviewed with attending with plan for patient to take Aspirin 81mg QD, Plavix 75mg QD, Toprol 100mg QD, Lisinopril 20mg QD, Rosuvastatin 40mg HS, Metformin 1000mg BID and home insulin regimen.

## 2020-09-30 DIAGNOSIS — I25.10 ATHEROSCLEROTIC HEART DISEASE OF NATIVE CORONARY ARTERY WITHOUT ANGINA PECTORIS: ICD-10-CM

## 2020-09-30 DIAGNOSIS — E11.9 TYPE 2 DIABETES MELLITUS WITHOUT COMPLICATIONS: ICD-10-CM

## 2020-09-30 DIAGNOSIS — I10 ESSENTIAL (PRIMARY) HYPERTENSION: ICD-10-CM

## 2020-09-30 DIAGNOSIS — Z95.5 PRESENCE OF CORONARY ANGIOPLASTY IMPLANT AND GRAFT: ICD-10-CM

## 2020-09-30 DIAGNOSIS — E78.5 HYPERLIPIDEMIA, UNSPECIFIED: ICD-10-CM

## 2020-09-30 DIAGNOSIS — R07.9 CHEST PAIN, UNSPECIFIED: ICD-10-CM

## 2020-10-09 PROCEDURE — C1725: CPT

## 2020-10-09 PROCEDURE — 83735 ASSAY OF MAGNESIUM: CPT

## 2020-10-09 PROCEDURE — 85027 COMPLETE CBC AUTOMATED: CPT

## 2020-10-09 PROCEDURE — 80048 BASIC METABOLIC PNL TOTAL CA: CPT

## 2020-10-09 PROCEDURE — 83036 HEMOGLOBIN GLYCOSYLATED A1C: CPT

## 2020-10-09 PROCEDURE — C1887: CPT

## 2020-10-09 PROCEDURE — 90686 IIV4 VACC NO PRSV 0.5 ML IM: CPT

## 2020-10-09 PROCEDURE — 93005 ELECTROCARDIOGRAM TRACING: CPT

## 2020-10-09 PROCEDURE — C1874: CPT

## 2020-10-09 PROCEDURE — 36415 COLL VENOUS BLD VENIPUNCTURE: CPT

## 2020-10-09 PROCEDURE — 82550 ASSAY OF CK (CPK): CPT

## 2020-10-09 PROCEDURE — C1760: CPT

## 2020-10-09 PROCEDURE — 85730 THROMBOPLASTIN TIME PARTIAL: CPT

## 2020-10-09 PROCEDURE — 82553 CREATINE MB FRACTION: CPT

## 2020-10-09 PROCEDURE — 82962 GLUCOSE BLOOD TEST: CPT

## 2020-10-09 PROCEDURE — 80061 LIPID PANEL: CPT

## 2020-10-09 PROCEDURE — C1769: CPT

## 2020-10-09 PROCEDURE — 85610 PROTHROMBIN TIME: CPT

## 2020-10-09 PROCEDURE — 80053 COMPREHEN METABOLIC PANEL: CPT

## 2020-10-09 PROCEDURE — 85025 COMPLETE CBC W/AUTO DIFF WBC: CPT

## 2020-10-09 PROCEDURE — C1894: CPT

## 2021-01-01 NOTE — H&P ADULT - NSICDXFAMILYHX_GEN_ALL_CORE_FT
FAMILY HISTORY:  Family history of early CAD, brother- PCI at 43, sister PCI at 58, sister PCI at 53    
no

## 2022-01-18 NOTE — DISCHARGE NOTE PROVIDER - NSDCFUSCHEDAPPT_GEN_ALL_CORE_FT
20 G Peripheral IV    Patient location during procedure: pre-op  Line placed for Difficult Access.  Performed By   CRNA: Tika Herron CRNA  Preanesthetic Checklist  Completed: patient identified, IV checked, site marked, risks and benefits discussed, surgical consent, monitors and equipment checked, pre-op evaluation and timeout performed  Peripheral IV Prep   Patient position: supine   Prep: ChloraPrep  Peripheral IV Procedure   Location:  Arm  Catheter size: 20 G         Post Assessment   Dressing Type: tape and transparent.    IV Dressing/Site: clean, dry and intact             ANDREINA PUGA ; 10/08/2019 ; NPP CT Surg [address]

## 2022-11-09 NOTE — DIETITIAN INITIAL EVALUATION ADULT. - PROBLEM SELECTOR PROBLEM 3
X Size Of Lesion In Cm (Optional): 0 Detail Level: Detailed Introduction Text (Please End With A Colon): The following procedure was deferred: Procedure To Be Performed At Next Visit: Biopsy by punch method CAD, multiple vessel

## 2025-01-18 NOTE — DISCHARGE NOTE NURSING/CASE MANAGEMENT/SOCIAL WORK - PATIENT PORTAL LINK FT
Pt admitted and oriented to room 5516. 4 eyes completed.     Pt A&Ox4. VSS on RA. No acute changes this shift. Pain managed via medication per MAR. Ambulation x1 walker GB. Voiding well via BRP. Tolerating PO diet. Minimal pain expressed thus far. Denies needs at this time. Fall precautions in place, call light within reach.    You can access the FollowMyHealth Patient Portal offered by [facility] by registering at the following website: [email or web address]. By joining [employer]’s FollowMyHealth portal, you will also be able to view your health information using other applications (apps) compatible with our system.

## 2025-01-28 VITALS
WEIGHT: 162.04 LBS | HEIGHT: 65 IN | HEART RATE: 77 BPM | SYSTOLIC BLOOD PRESSURE: 132 MMHG | RESPIRATION RATE: 18 BRPM | OXYGEN SATURATION: 96 % | DIASTOLIC BLOOD PRESSURE: 60 MMHG | TEMPERATURE: 97 F

## 2025-01-28 RX ORDER — ANTISEPTIC SURGICAL SCRUB 0.04 MG/ML
1 SOLUTION TOPICAL ONCE
Refills: 0 | Status: DISCONTINUED | OUTPATIENT
Start: 2025-02-05 | End: 2025-02-05

## 2025-01-28 RX ORDER — ASPIRIN 81 MG/1
81 TABLET, COATED ORAL DAILY
Refills: 0 | Status: DISCONTINUED | OUTPATIENT
Start: 2025-02-05 | End: 2025-02-05

## 2025-01-28 RX ORDER — BACTERIOSTATIC SODIUM CHLORIDE 0.9 %
1000 VIAL (ML) INJECTION
Refills: 0 | Status: DISCONTINUED | OUTPATIENT
Start: 2025-02-05 | End: 2025-02-05

## 2025-01-28 RX ORDER — BACTERIOSTATIC SODIUM CHLORIDE 0.9 %
250 VIAL (ML) INJECTION ONCE
Refills: 0 | Status: COMPLETED | OUTPATIENT
Start: 2025-02-05 | End: 2025-02-05

## 2025-01-28 NOTE — H&P ADULT - HISTORY OF PRESENT ILLNESS
Cardiologist: Dr. Palomo   Pharmacy:   Escort:     64 YOF, +FMHx of CAD (brother and two sisters with PCIs in their 50s),  PMHx of CAD (s/p CABG__ year?/location?), HLD, HTN, DM2, presents to outpatient cardiologist c/o exertional chest pain and SOB. Underwent Myocardial Perfusion Stress Test 12/21/24: medium size reversible perfusion deficit of moderate intensity involving the entire basal-apical, inferio/inferolateral and lateral myocardial walls suggestive of inducible myocardial ischemia in the posterior and lateral coronary circulation distribution, LVSF normal, normal stress EKG. Per MD report, patient has a history of CABG and PCIs in the past (no further details).  Cardiologist: Dr. Palomo   Pharmacy: [facility]  Escort: Neisah Macario (son/healthcare proxy) [phone]    64 yr old F, primarily Mongolian speaking, +FMHx of CAD (brother and two sisters with PCIs in their 50s),  PMHx HTN, HLD, DM type II, vertigo, carotid artery stenosis (s/p right endarterectomy), CAD s/p CABG (MIDCAB LIMA-LAD 9/27/19 c/b moderate pericardial effusion s/p drain @[facility]), multiple PCIs (most recently@[facility]  9/23/20 with GLORIA pRCA, GLORIA mRCA, PTCA mLCx ISR, GLORIA D1 ISR; mLAD  that fills via patent LIMA-LAD) who returned to cardiologist c/o intermittent chest tightness and SOB with moderate exertion x several months. Patient denies any diaphoresis, palpitations, dizziness, PND, orthopnea, LE edema, recent travel or sick contacts. Patient underwent Myocardial Perfusion Stress Test 12/21/24: medium size reversible perfusion deficit of moderate intensity involving the entire basal-apical, inferio/inferolateral and lateral myocardial walls suggestive of inducible myocardial ischemia in the posterior and lateral coronary circulation distribution, LVSF normal, normal stress EKG. In light of patient's risk factors, CCS Angina Class III equivalent symptoms and abnormal NST, patient now presents for recommended cardiac catheterization with possible intervention.

## 2025-01-28 NOTE — H&P ADULT - NSICDXPASTMEDICALHX_GEN_ALL_CORE_FT
PAST MEDICAL HISTORY:  CAD (coronary artery disease)     HLD (hyperlipidemia)     HTN (hypertension)     Insulin dependent type 2 diabetes mellitus

## 2025-01-28 NOTE — H&P ADULT - NSICDXFAMILYHX_GEN_ALL_CORE_FT
FAMILY HISTORY:  Sibling  Still living? Yes, Estimated age: Age Unknown  FH: CAD (coronary artery disease), Age at diagnosis: Age Unknown

## 2025-01-28 NOTE — H&P ADULT - ASSESSMENT
64 yr old F, primarily Telugu speaking, +FMHx of CAD (brother and two sisters with PCIs in their 50s),  PMHx HTN, HLD, DM type II, vertigo, carotid artery stenosis (s/p right endarterectomy), CAD s/p CABG (MIDCAB LIMA-LAD 9/27/19 c/b moderate pericardial effusion s/p drain @[facility]), multiple PCIs with CCS Angina Class III equivalent symptoms and abnormal NST who presents to [facility] for recommended cardiac catheterization with possible intervention.       ASA:	III		Mallampati class:  III    Sedation Plan: Moderate    Patient Is Suitable Candidate For Sedation: Yes    Cardiac: Risks & benefits of procedure and alternative therapy have been explained to the patient &/or HCP including but not limited to: allergic reaction, bleeding, infection, arrhythmia, renal and vascular compromise, limb damage, MI, CVA, emergent CABG and death. Informed consent obtained and in chart.    --precath/consented, took Plavix 75mg PO x 1 dose prior to arrival. Will administer ASA 81mg PO x 1 dose.  --IVF hydration ordered and Magnesium repleted.

## 2025-02-05 ENCOUNTER — OUTPATIENT (OUTPATIENT)
Dept: OUTPATIENT SERVICES | Facility: HOSPITAL | Age: 65
LOS: 1 days | Discharge: HOME | End: 2025-02-05
Payer: MEDICARE

## 2025-02-05 DIAGNOSIS — Z98.890 OTHER SPECIFIED POSTPROCEDURAL STATES: Chronic | ICD-10-CM

## 2025-02-05 DIAGNOSIS — Z95.1 PRESENCE OF AORTOCORONARY BYPASS GRAFT: Chronic | ICD-10-CM

## 2025-02-05 LAB
A1C WITH ESTIMATED AVERAGE GLUCOSE RESULT: 6.4 % — HIGH (ref 4–5.6)
ALBUMIN SERPL ELPH-MCNC: 3.4 G/DL — SIGNIFICANT CHANGE UP (ref 3.3–5)
ALBUMIN SERPL ELPH-MCNC: 4.3 G/DL — SIGNIFICANT CHANGE UP (ref 3.3–5)
ALP SERPL-CCNC: 54 U/L — SIGNIFICANT CHANGE UP (ref 40–120)
ALP SERPL-CCNC: 63 U/L — SIGNIFICANT CHANGE UP (ref 40–120)
ALT FLD-CCNC: 23 U/L — SIGNIFICANT CHANGE UP (ref 10–45)
ALT FLD-CCNC: SIGNIFICANT CHANGE UP (ref 10–45)
ANION GAP SERPL CALC-SCNC: 10 MMOL/L — SIGNIFICANT CHANGE UP (ref 5–17)
ANION GAP SERPL CALC-SCNC: 11 MMOL/L — SIGNIFICANT CHANGE UP (ref 5–17)
APTT BLD: 32.9 SEC — SIGNIFICANT CHANGE UP (ref 24.5–35.6)
AST SERPL-CCNC: 26 U/L — SIGNIFICANT CHANGE UP (ref 10–40)
AST SERPL-CCNC: SIGNIFICANT CHANGE UP (ref 10–40)
BASOPHILS # BLD AUTO: 0 K/UL — SIGNIFICANT CHANGE UP (ref 0–0.2)
BASOPHILS NFR BLD AUTO: 0 % — SIGNIFICANT CHANGE UP (ref 0–2)
BILIRUB SERPL-MCNC: 0.2 MG/DL — SIGNIFICANT CHANGE UP (ref 0.2–1.2)
BILIRUB SERPL-MCNC: 0.2 MG/DL — SIGNIFICANT CHANGE UP (ref 0.2–1.2)
BUN SERPL-MCNC: 13 MG/DL — SIGNIFICANT CHANGE UP (ref 7–23)
BUN SERPL-MCNC: 15 MG/DL — SIGNIFICANT CHANGE UP (ref 7–23)
CALCIUM SERPL-MCNC: 8.4 MG/DL — SIGNIFICANT CHANGE UP (ref 8.4–10.5)
CALCIUM SERPL-MCNC: 9.6 MG/DL — SIGNIFICANT CHANGE UP (ref 8.4–10.5)
CHLORIDE SERPL-SCNC: 103 MMOL/L — SIGNIFICANT CHANGE UP (ref 96–108)
CHLORIDE SERPL-SCNC: 103 MMOL/L — SIGNIFICANT CHANGE UP (ref 96–108)
CHOLEST SERPL-MCNC: 108 MG/DL — SIGNIFICANT CHANGE UP
CK MB CFR SERPL CALC: 1.7 NG/ML — SIGNIFICANT CHANGE UP (ref 0–6.7)
CK SERPL-CCNC: 55 U/L — SIGNIFICANT CHANGE UP (ref 25–170)
CO2 SERPL-SCNC: 27 MMOL/L — SIGNIFICANT CHANGE UP (ref 22–31)
CO2 SERPL-SCNC: 28 MMOL/L — SIGNIFICANT CHANGE UP (ref 22–31)
CREAT SERPL-MCNC: 0.82 MG/DL — SIGNIFICANT CHANGE UP (ref 0.5–1.3)
CREAT SERPL-MCNC: 1.02 MG/DL — SIGNIFICANT CHANGE UP (ref 0.5–1.3)
EGFR: 61 ML/MIN/1.73M2 — SIGNIFICANT CHANGE UP
EGFR: 79 ML/MIN/1.73M2 — SIGNIFICANT CHANGE UP
EOSINOPHIL # BLD AUTO: 0.15 K/UL — SIGNIFICANT CHANGE UP (ref 0–0.5)
EOSINOPHIL NFR BLD AUTO: 1.8 % — SIGNIFICANT CHANGE UP (ref 0–6)
ESTIMATED AVERAGE GLUCOSE: 137 MG/DL — HIGH (ref 68–114)
GLUCOSE SERPL-MCNC: 120 MG/DL — HIGH (ref 70–99)
GLUCOSE SERPL-MCNC: 98 MG/DL — SIGNIFICANT CHANGE UP (ref 70–99)
HCT VFR BLD CALC: 30.8 % — LOW (ref 34.5–45)
HCT VFR BLD CALC: 38.2 % — SIGNIFICANT CHANGE UP (ref 34.5–45)
HDLC SERPL-MCNC: 32 MG/DL — LOW
HGB BLD-MCNC: 11 G/DL — LOW (ref 11.5–15.5)
HGB BLD-MCNC: 9.1 G/DL — LOW (ref 11.5–15.5)
INR BLD: 0.86 — SIGNIFICANT CHANGE UP (ref 0.85–1.16)
LIPID PNL WITH DIRECT LDL SERPL: 45 MG/DL — SIGNIFICANT CHANGE UP
LYMPHOCYTES # BLD AUTO: 1.92 K/UL — SIGNIFICANT CHANGE UP (ref 1–3.3)
LYMPHOCYTES # BLD AUTO: 23.2 % — SIGNIFICANT CHANGE UP (ref 13–44)
MAGNESIUM SERPL-MCNC: 1.6 MG/DL — SIGNIFICANT CHANGE UP (ref 1.6–2.6)
MAGNESIUM SERPL-MCNC: 1.7 MG/DL — SIGNIFICANT CHANGE UP (ref 1.6–2.6)
MCHC RBC-ENTMCNC: 20.3 PG — LOW (ref 27–34)
MCHC RBC-ENTMCNC: 20.5 PG — LOW (ref 27–34)
MCHC RBC-ENTMCNC: 28.8 G/DL — LOW (ref 32–36)
MCHC RBC-ENTMCNC: 29.5 G/DL — LOW (ref 32–36)
MCV RBC AUTO: 69.4 FL — LOW (ref 80–100)
MCV RBC AUTO: 70.3 FL — LOW (ref 80–100)
MONOCYTES # BLD AUTO: 0.37 K/UL — SIGNIFICANT CHANGE UP (ref 0–0.9)
MONOCYTES NFR BLD AUTO: 4.5 % — SIGNIFICANT CHANGE UP (ref 2–14)
NEUTROPHILS # BLD AUTO: 5.84 K/UL — SIGNIFICANT CHANGE UP (ref 1.8–7.4)
NEUTROPHILS NFR BLD AUTO: 70.5 % — SIGNIFICANT CHANGE UP (ref 43–77)
NON HDL CHOLESTEROL: 76 MG/DL — SIGNIFICANT CHANGE UP
NRBC # BLD: 0 /100 WBCS — SIGNIFICANT CHANGE UP (ref 0–0)
NRBC BLD-RTO: 0 /100 WBCS — SIGNIFICANT CHANGE UP (ref 0–0)
PLATELET # BLD AUTO: 225 K/UL — SIGNIFICANT CHANGE UP (ref 150–400)
PLATELET # BLD AUTO: 319 K/UL — SIGNIFICANT CHANGE UP (ref 150–400)
POTASSIUM SERPL-MCNC: 4.8 MMOL/L — SIGNIFICANT CHANGE UP (ref 3.5–5.3)
POTASSIUM SERPL-MCNC: SIGNIFICANT CHANGE UP (ref 3.5–5.3)
POTASSIUM SERPL-SCNC: 4.8 MMOL/L — SIGNIFICANT CHANGE UP (ref 3.5–5.3)
POTASSIUM SERPL-SCNC: SIGNIFICANT CHANGE UP (ref 3.5–5.3)
PROT SERPL-MCNC: 5.9 G/DL — LOW (ref 6–8.3)
PROT SERPL-MCNC: 6.9 G/DL — SIGNIFICANT CHANGE UP (ref 6–8.3)
PROTHROM AB SERPL-ACNC: 10.1 SEC — SIGNIFICANT CHANGE UP (ref 9.9–13.4)
RBC # BLD: 4.44 M/UL — SIGNIFICANT CHANGE UP (ref 3.8–5.2)
RBC # BLD: 5.43 M/UL — HIGH (ref 3.8–5.2)
RBC # FLD: 18.5 % — HIGH (ref 10.3–14.5)
RBC # FLD: 19 % — HIGH (ref 10.3–14.5)
SODIUM SERPL-SCNC: 140 MMOL/L — SIGNIFICANT CHANGE UP (ref 135–145)
SODIUM SERPL-SCNC: 142 MMOL/L — SIGNIFICANT CHANGE UP (ref 135–145)
TRIGL SERPL-MCNC: 187 MG/DL — HIGH
WBC # BLD: 6.5 K/UL — SIGNIFICANT CHANGE UP (ref 3.8–10.5)
WBC # BLD: 8.29 K/UL — SIGNIFICANT CHANGE UP (ref 3.8–10.5)
WBC # FLD AUTO: 6.5 K/UL — SIGNIFICANT CHANGE UP (ref 3.8–10.5)
WBC # FLD AUTO: 8.29 K/UL — SIGNIFICANT CHANGE UP (ref 3.8–10.5)

## 2025-02-05 PROCEDURE — 80053 COMPREHEN METABOLIC PANEL: CPT

## 2025-02-05 PROCEDURE — 82962 GLUCOSE BLOOD TEST: CPT

## 2025-02-05 PROCEDURE — 85730 THROMBOPLASTIN TIME PARTIAL: CPT

## 2025-02-05 PROCEDURE — 93459 L HRT ART/GRFT ANGIO: CPT | Mod: 26,59

## 2025-02-05 PROCEDURE — 82553 CREATINE MB FRACTION: CPT

## 2025-02-05 PROCEDURE — 85027 COMPLETE CBC AUTOMATED: CPT

## 2025-02-05 PROCEDURE — 92920 PRQ TRLUML C ANGIOP 1ART&/BR: CPT | Mod: RC

## 2025-02-05 PROCEDURE — C1725: CPT

## 2025-02-05 PROCEDURE — C1760: CPT

## 2025-02-05 PROCEDURE — 82550 ASSAY OF CK (CPK): CPT

## 2025-02-05 PROCEDURE — 83735 ASSAY OF MAGNESIUM: CPT

## 2025-02-05 PROCEDURE — 82803 BLOOD GASES ANY COMBINATION: CPT

## 2025-02-05 PROCEDURE — 93005 ELECTROCARDIOGRAM TRACING: CPT

## 2025-02-05 PROCEDURE — 85025 COMPLETE CBC W/AUTO DIFF WBC: CPT

## 2025-02-05 PROCEDURE — C1894: CPT

## 2025-02-05 PROCEDURE — 85610 PROTHROMBIN TIME: CPT

## 2025-02-05 PROCEDURE — 99152 MOD SED SAME PHYS/QHP 5/>YRS: CPT

## 2025-02-05 PROCEDURE — C1887: CPT

## 2025-02-05 PROCEDURE — 83036 HEMOGLOBIN GLYCOSYLATED A1C: CPT

## 2025-02-05 PROCEDURE — 93459 L HRT ART/GRFT ANGIO: CPT | Mod: 59

## 2025-02-05 PROCEDURE — C1769: CPT

## 2025-02-05 PROCEDURE — 93010 ELECTROCARDIOGRAM REPORT: CPT | Mod: 76

## 2025-02-05 PROCEDURE — 80061 LIPID PANEL: CPT

## 2025-02-05 RX ORDER — METOPROLOL SUCCINATE 25 MG
1 TABLET, EXTENDED RELEASE 24 HR ORAL
Qty: 30 | Refills: 2
Start: 2025-02-05 | End: 2025-05-05

## 2025-02-05 RX ORDER — MAGNESIUM OXIDE 400 MG
800 TABLET ORAL ONCE
Refills: 0 | Status: COMPLETED | OUTPATIENT
Start: 2025-02-05 | End: 2025-02-05

## 2025-02-05 RX ORDER — ASPIRIN 81 MG/1
1 TABLET, COATED ORAL
Qty: 30 | Refills: 11
Start: 2025-02-05 | End: 2026-01-30

## 2025-02-05 RX ORDER — BACTERIOSTATIC SODIUM CHLORIDE 0.9 %
1000 VIAL (ML) INJECTION
Refills: 0 | Status: DISCONTINUED | OUTPATIENT
Start: 2025-02-05 | End: 2025-02-05

## 2025-02-05 RX ADMIN — Medication 500 MILLILITER(S): at 07:53

## 2025-02-05 RX ADMIN — Medication 800 MILLIGRAM(S): at 12:31

## 2025-02-05 RX ADMIN — Medication 75 MILLILITER(S): at 07:53

## 2025-02-05 RX ADMIN — Medication 800 MILLIGRAM(S): at 07:53

## 2025-02-05 RX ADMIN — Medication 220 MILLILITER(S): at 09:32

## 2025-02-05 RX ADMIN — ASPIRIN 81 MILLIGRAM(S): 81 TABLET, COATED ORAL at 07:54

## 2025-02-05 NOTE — PROGRESS NOTE ADULT - SUBJECTIVE AND OBJECTIVE BOX
Interventional Cardiology PA Post PCI SDA Discharge Note      Patient without complaints. Ambulated and voided without difficulties    Ext: Right Groin: no hematoma, no bleeding, dressing; C/D/I    Pulses: intact DP/PT to baseline     A/P: 64 yr old F, primarily Croatian speaking, +FMHx of CAD (brother and two sisters with PCIs in their 50s),  PMHx HTN, HLD, DM type II, vertigo, carotid artery stenosis (s/p right endarterectomy), CAD s/p CABG (MIDCAB LIMA-LAD 9/27/19 c/b moderate pericardial effusion s/p drain @[facility]), multiple PCIs (most recently@[facility]  9/23/20 with GLORIA pRCA, GLORIA mRCA, PTCA mLCx ISR, GLORIA D1 ISR; mLAD  that fills via patent LIMA-LAD) who returned to cardiologist c/o intermittent chest tightness and SOB with moderate exertion x several months, who now presents to [facility] for LHC now with possible intervention if clinically indicated in light of patient's risk factors, CCS Angina Class III equivalent symptoms and abnormal NST.      Pt now s/p cardiac cath 2/5/25: PTCA to p/mRCA ISR. LM normal. mLAD ISR  with patent LIMA-LAD graft. D1 patent stent. mLCx patent stent. No LVEDP, LVEF normal  IC/Fellow: Dr. Palomo/[place]  Access: R FA AS  DAPT: continue ASA/plavix per Dr. Palomo  Statin: rosuvastatin 40 mg  IVF: 22 cc/hr x 6 hrs or until d/c    1. Follow-up with PMD/Cardiologist Dr. Palomo in 72 hours.  2. Post procedure labs/EKG reviewed and stable.    3. Pt given instructions on importance of taking antiplatelet medication.    4. Stable for discharge as per attending Dr. Palomo after bed rest, pt voids, wrist stable and 30 minutes of ambulation.  5. Prescriptions for Aspirin and Plavix e-prescribed and submitted to patient's pharmacy.  6. Patient will continue Rosuvastatin 40 mg.  7. Patient requesting refill of Metoprolol, sent to pharmacy   Interventional Cardiology PA Post PCI SDA Discharge Note      Patient without complaints. Ambulated and voided without difficulties    Ext: Right Groin: no hematoma, no bleeding, dressing; C/D/I    Pulses: intact DP/PT to baseline     A/P: 64 yr old F, primarily Italian speaking, +FMHx of CAD (brother and two sisters with PCIs in their 50s),  PMHx HTN, HLD, DM type II, vertigo, carotid artery stenosis (s/p right endarterectomy), CAD s/p CABG (MIDCAB LIMA-LAD 9/27/19 c/b moderate pericardial effusion s/p drain @[facility]), multiple PCIs (most recently@[facility]  9/23/20 with GLORIA pRCA, GLORIA mRCA, PTCA mLCx ISR, GLORIA D1 ISR; mLAD  that fills via patent LIMA-LAD) who returned to cardiologist c/o intermittent chest tightness and SOB with moderate exertion x several months, who now presents to [facility] for LHC now with possible intervention if clinically indicated in light of patient's risk factors, CCS Angina Class III equivalent symptoms and abnormal NST.      Pt now s/p cardiac cath 2/5/25: PTCA to p/mRCA ISR. LM normal. mLAD ISR  with patent LIMA-LAD graft. D1 patent stent. mLCx patent stent. No LVEDP, LVEF normal  IC/Fellow: Dr. Palomo/[place]  Access: R FA AS  DAPT: continue ASA/plavix per Dr. Palomo  Statin: rosuvastatin 40 mg  IVF: 22 cc/hr x 6 hrs or until d/c    1. Follow-up with PMD/Cardiologist Dr. Palomo in 72 hours.  2. Post procedure labs/EKG reviewed and stable.    3. Pt given instructions on importance of taking antiplatelet medication.    4. Stable for discharge as per attending Dr. Palomo after bed rest, pt voids, wrist stable and 30 minutes of ambulation.  5. Prescriptions for Aspirin and Plavix e-prescribed and submitted to patient's pharmacy.  6. Patient will continue Rosuvastatin 40 mg.  7. Patient requesting refill of Metoprolol, sent to pharmacy             Cardiac Rehab Indications: select one  (STEMI/NSTEMI/ACS/Unstable Angina/CHF/Chronic Stable Angina/Heart Surgery (CABG,Valve)/Post PCI/unsuccessful PCI):              *Education on benefits of Cardiac Rehab provided to patient: Yes         *Referral and Prescription Given for Cardiac Rehab: Yes         *Pt given list of locations & instructed to contact their insurance company to review list of participating providers. Yes          *Pt instructed to bring Cardiac Rehab prescription with them to Cardiology Follow up appointment for assistance with enrollment: Yes         *Pt discharge copies detail cardiovascular history, medications, testing/treatments OR pt has created a patient portal account and instructed to provider their records at their 1st appointment: Yes         *CVD (STEMI/NSTEMI/ACS/UA &/OR Post PCI this admission): GLP-1 receptor agonist, SGLT2 inhibitor meds discussed w/ patients and encouraged to discuss further with PMD or endo at next visit: Yes                     Statin Prescribed (STEMI/NSTEMI/ACS/UA/Post PCI/unsuccessful PCI this admission): Patient is already on rosuvastatin   DAPT Post PCI: Prescriptions for Aspirin/Plavix e-prescribed to patient's pharmacy: Yes Interventional Cardiology PA Post PCI SDA Discharge Note      Patient without complaints. Ambulated and voided without difficulties    Ext: Right Groin: no hematoma, no bleeding, dressing; C/D/I    Pulses: intact DP/PT to baseline     A/P: 64 yr old F, primarily Telugu speaking, +FMHx of CAD (brother and two sisters with PCIs in their 50s),  PMHx HTN, HLD, DM type II, vertigo, carotid artery stenosis (s/p right endarterectomy), CAD s/p CABG (MIDCAB LIMA-LAD 9/27/19 c/b moderate pericardial effusion s/p drain @[facility]), multiple PCIs (most recently@[facility]  9/23/20 with GLORIA pRCA, GLORIA mRCA, PTCA mLCx ISR, GLORIA D1 ISR; mLAD  that fills via patent LIMA-LAD) who returned to cardiologist c/o intermittent chest tightness and SOB with moderate exertion x several months, who now presents to [facility] for LHC now with possible intervention if clinically indicated in light of patient's risk factors, CCS Angina Class III equivalent symptoms and abnormal NST.      Pt now s/p cardiac cath 2/5/25: PTCA to p/mRCA ISR. LM normal. mLAD ISR  with patent LIMA-LAD graft. D1 patent stent. mLCx patent stent. No LVEDP, LVEF normal  IC/Fellow: Dr. Palomo/[place]  Access: R FA AS  DAPT: continue ASA/plavix per Dr. Palomo  Statin: rosuvastatin 40 mg  IVF: 22 cc/hr x 6 hrs or until d/c    1. Follow-up with PMD/Cardiologist Dr. Palomo in 72 hours.  2. Post procedure labs/EKG reviewed and stable.    3. Pt given instructions on importance of taking antiplatelet medication.    4. Stable for discharge as per attending Dr. Palomo after bed rest, pt voids, wrist stable and 30 minutes of ambulation.  5. Prescriptions for Aspirin and Plavix e-prescribed and submitted to patient's pharmacy.  6. Patient will continue Rosuvastatin 40 mg.  7. Patient requesting refill of Metoprolol, sent to pharmacy      Cardiac Rehab Indications: select one  (STEMI/NSTEMI/ACS/Unstable Angina/CHF/Chronic Stable Angina/Heart Surgery (CABG,Valve)/Post PCI/unsuccessful PCI):            *Education on benefits of Cardiac Rehab provided to patient: Yes         *Referral and Prescription Given for Cardiac Rehab: Yes         *Pt given list of locations & instructed to contact their insurance company to review list of participating providers. Yes          *Pt instructed to bring Cardiac Rehab prescription with them to Cardiology Follow up appointment for assistance with enrollment: Yes         *Pt discharge copies detail cardiovascular history, medications, testing/treatments OR pt has created a patient portal account and instructed to provider their records at their 1st appointment: Yes         *CVD (STEMI/NSTEMI/ACS/UA &/OR Post PCI this admission): GLP-1 receptor agonist, SGLT2 inhibitor meds discussed w/ patients and encouraged to discuss further with PMD or endo at next visit: Yes             Statin Prescribed (STEMI/NSTEMI/ACS/UA/Post PCI/unsuccessful PCI this admission): Patient is already on rosuvastatin   DAPT Post PCI: Prescriptions for Aspirin/Plavix e-prescribed to patient's pharmacy: Yes Interventional Cardiology PA Post PCI SDA Discharge Note      Patient without complaints. Ambulated and voided without difficulties    Ext: Right Groin: no hematoma, no bleeding, dressing; C/D/I    Pulses: intact DP/PT to baseline     A/P: 64 yr old F, primarily Yoruba speaking, +FMHx of CAD (brother and two sisters with PCIs in their 50s),  PMHx HTN, HLD, DM type II, vertigo, carotid artery stenosis (s/p right endarterectomy), CAD s/p CABG (MIDCAB LIMA-LAD 9/27/19 c/b moderate pericardial effusion s/p drain @[facility]), multiple PCIs (most recently@[facility]  9/23/20 with GLORIA pRCA, GLORIA mRCA, PTCA mLCx ISR, GLORIA D1 ISR; mLAD  that fills via patent LIMA-LAD) who returned to cardiologist c/o intermittent chest tightness and SOB with moderate exertion x several months, who now presents to [facility] for LHC now with possible intervention if clinically indicated in light of patient's risk factors, CCS Angina Class III equivalent symptoms and abnormal NST.      Pt now s/p cardiac cath 2/5/25: PTCA to p/mRCA ISR. LM normal. mLAD ISR  with patent LIMA-LAD graft. D1 patent stent. mLCx patent stent. No LVEDP, LVEF normal  IC/Fellow: Dr. Palomo/[place]  Access: R FA AS  DAPT: continue ASA/plavix per Dr. Palomo  Statin: rosuvastatin 40 mg  IVF: 22 cc/hr x 6 hrs or until d/c    1. Follow-up with PMD/Cardiologist Dr. Palomo in 72 hours.  2. Post procedure labs/EKG reviewed and stable.    3. Pt given instructions on importance of taking antiplatelet medication.    4. Stable for discharge as per attending Dr. Palomo after bed rest, pt voids, groin stable and 30 minutes of ambulation.  5. Prescriptions for Aspirin and Plavix e-prescribed and submitted to patient's pharmacy.  6. Patient will continue Rosuvastatin 40 mg.  7. Patient requesting refill of Metoprolol, sent to pharmacy      Cardiac Rehab Indications: select one  (STEMI/NSTEMI/ACS/Unstable Angina/CHF/Chronic Stable Angina/Heart Surgery (CABG,Valve)/Post PCI/unsuccessful PCI):            *Education on benefits of Cardiac Rehab provided to patient: Yes         *Referral and Prescription Given for Cardiac Rehab: Yes         *Pt given list of locations & instructed to contact their insurance company to review list of participating providers. Yes          *Pt instructed to bring Cardiac Rehab prescription with them to Cardiology Follow up appointment for assistance with enrollment: Yes         *Pt discharge copies detail cardiovascular history, medications, testing/treatments OR pt has created a patient portal account and instructed to provider their records at their 1st appointment: Yes         *CVD (STEMI/NSTEMI/ACS/UA &/OR Post PCI this admission): GLP-1 receptor agonist, SGLT2 inhibitor meds discussed w/ patients and encouraged to discuss further with PMD or endo at next visit: Yes             Statin Prescribed (STEMI/NSTEMI/ACS/UA/Post PCI/unsuccessful PCI this admission): Patient is already on rosuvastatin   DAPT Post PCI: Prescriptions for Aspirin/Plavix e-prescribed to patient's pharmacy: Yes Interventional Cardiology PA Post PCI SDA Discharge Note      Patient without complaints. Ambulated and voided without difficulties    Ext: Right Groin: no hematoma, no bleeding, dressing; C/D/I    Pulses: intact DP/PT to baseline     A/P: 64 yr old F, primarily Slovak speaking, +FMHx of CAD (brother and two sisters with PCIs in their 50s),  PMHx HTN, HLD, DM type II, vertigo, carotid artery stenosis (s/p right endarterectomy), CAD s/p CABG (MIDCAB LIMA-LAD 9/27/19 c/b moderate pericardial effusion s/p drain @[facility]), multiple PCIs (most recently@[facility]  9/23/20 with GLORIA pRCA, GLORIA mRCA, PTCA mLCx ISR, GLORIA D1 ISR; mLAD  that fills via patent LIMA-LAD) who returned to cardiologist c/o intermittent chest tightness and SOB with moderate exertion x several months, who now presents to [facility] for LHC now with possible intervention if clinically indicated in light of patient's risk factors, CCS Angina Class III equivalent symptoms and abnormal NST.      Pt now s/p cardiac cath 2/5/25: PTCA to p/mRCA ISR. LM normal. mLAD ISR  with patent LIMA-LAD graft. D1 patent stent. mLCx patent stent. No LVEDP, LVEF normal  IC/Fellow: Dr. Palomo/[place]  Access: R FA AS  DAPT: continue ASA/plavix per Dr. Palomo  Statin: rosuvastatin 40 mg  IVF: 22 cc/hr x 6 hrs or until d/c    1. Follow-up with PMD/Cardiologist Dr. Palomo in 72 hours.  2. Post procedure labs/EKG reviewed and stable.    3. Pt given instructions on importance of taking antiplatelet medication.    4. Stable for discharge as per attending Dr. Palomo after bed rest, pt voids, groin stable and 30 minutes of ambulation.  5. Prescriptions for Aspirin and Plavix e-prescribed and submitted to patient's pharmacy.  6. Patient will continue Rosuvastatin 40 mg.  7. Patient requesting refill of Metoprolol, sent to pharmacy  8. Post cath hemoglobin dropped from 11 -> 9.1. WBC and platelets decreased as well. Assessed RFA access site, no hematoma present. Patient feeling well. Dr. Palomo aware and will repeat labs tomorrow  9. Post cath mag 1.6 after receiving 800mh Magnesium, ordered additional 800mg to be given      Cardiac Rehab Indications: select one  (STEMI/NSTEMI/ACS/Unstable Angina/CHF/Chronic Stable Angina/Heart Surgery (CABG,Valve)/Post PCI/unsuccessful PCI):            *Education on benefits of Cardiac Rehab provided to patient: Yes         *Referral and Prescription Given for Cardiac Rehab: Yes         *Pt given list of locations & instructed to contact their insurance company to review list of participating providers. Yes          *Pt instructed to bring Cardiac Rehab prescription with them to Cardiology Follow up appointment for assistance with enrollment: Yes         *Pt discharge copies detail cardiovascular history, medications, testing/treatments OR pt has created a patient portal account and instructed to provider their records at their 1st appointment: Yes         *CVD (STEMI/NSTEMI/ACS/UA &/OR Post PCI this admission): GLP-1 receptor agonist, SGLT2 inhibitor meds discussed w/ patients and encouraged to discuss further with PMD or endo at next visit: Yes             Statin Prescribed (STEMI/NSTEMI/ACS/UA/Post PCI/unsuccessful PCI this admission): Patient is already on rosuvastatin   DAPT Post PCI: Prescriptions for Aspirin/Plavix e-prescribed to patient's pharmacy: Yes Interventional Cardiology PA Post PCI SDA Discharge Note      Patient without complaints. Ambulated and voided without difficulties    Ext: Right Groin: no hematoma, no bleeding, dressing; C/D/I    Pulses: intact DP/PT to baseline     A/P: 64 yr old F, primarily Slovenian speaking, +FMHx of CAD (brother and two sisters with PCIs in their 50s),  PMHx HTN, HLD, DM type II, vertigo, carotid artery stenosis (s/p right endarterectomy), CAD s/p CABG (MIDCAB LIMA-LAD 9/27/19 c/b moderate pericardial effusion s/p drain @[facility]), multiple PCIs (most recently@[facility]  9/23/20 with GLORIA pRCA, GLORIA mRCA, PTCA mLCx ISR, GLORIA D1 ISR; mLAD  that fills via patent LIMA-LAD) who returned to cardiologist c/o intermittent chest tightness and SOB with moderate exertion x several months, who now presents to [facility] for LHC now with possible intervention if clinically indicated in light of patient's risk factors, CCS Angina Class III equivalent symptoms and abnormal NST.      Pt now s/p cardiac cath 2/5/25: PTCA to p/mRCA ISR. LM normal. mLAD ISR  with patent LIMA-LAD graft. D1 patent stent. mLCx patent stent. No LVEDP, LVEF normal  IC/Fellow: Dr. Palomo/[place]  Access: R FA AS  DAPT: continue ASA/plavix per Dr. Palomo  Statin: rosuvastatin 40 mg  IVF: 22 cc/hr x 6 hrs or until d/c    1. Follow-up with PMD/Cardiologist Dr. Palomo in 72 hours.  2. Post procedure labs/EKG reviewed and stable.    3. Pt given instructions on importance of taking antiplatelet medication.    4. Stable for discharge as per attending Dr. Palomo after bed rest, pt voids, groin stable and 30 minutes of ambulation.  5. Prescriptions for Aspirin and Plavix e-prescribed and submitted to patient's pharmacy.  6. Patient will continue Rosuvastatin 40 mg.  7. Patient requesting refill of Metoprolol, sent to pharmacy  8. Post cath hemoglobin dropped from 11 -> 9.1. WBC and platelets decreased as well. Assessed RFA access site, no hematoma present. Patient feeling well. Dr. Palomo aware and will repeat labs tomorrow  9. Post cath mag 1.6 after receiving 800mg Magnesium, ordered additional 800mg to be given  10. Post cath CMP labs hemolyzed, K pre 4.8, per Dr. Palomo will recheck in office tomorrow      Cardiac Rehab Indications: select one  (STEMI/NSTEMI/ACS/Unstable Angina/CHF/Chronic Stable Angina/Heart Surgery (CABG,Valve)/Post PCI/unsuccessful PCI):            *Education on benefits of Cardiac Rehab provided to patient: Yes         *Referral and Prescription Given for Cardiac Rehab: Yes         *Pt given list of locations & instructed to contact their insurance company to review list of participating providers. Yes          *Pt instructed to bring Cardiac Rehab prescription with them to Cardiology Follow up appointment for assistance with enrollment: Yes         *Pt discharge copies detail cardiovascular history, medications, testing/treatments OR pt has created a patient portal account and instructed to provider their records at their 1st appointment: Yes         *CVD (STEMI/NSTEMI/ACS/UA &/OR Post PCI this admission): GLP-1 receptor agonist, SGLT2 inhibitor meds discussed w/ patients and encouraged to discuss further with PMD or endo at next visit: Yes             Statin Prescribed (STEMI/NSTEMI/ACS/UA/Post PCI/unsuccessful PCI this admission): Patient is already on rosuvastatin   DAPT Post PCI: Prescriptions for Aspirin/Plavix e-prescribed to patient's pharmacy: Yes

## 2025-02-19 PROBLEM — E11.9 TYPE 2 DIABETES MELLITUS WITHOUT COMPLICATIONS: Chronic | Status: ACTIVE | Noted: 2020-09-21

## 2025-02-19 PROBLEM — Z86.79 PERSONAL HISTORY OF OTHER DISEASES OF THE CIRCULATORY SYSTEM: Chronic | Status: ACTIVE | Noted: 2020-09-21

## 2025-02-19 PROBLEM — E78.5 HYPERLIPIDEMIA, UNSPECIFIED: Chronic | Status: ACTIVE | Noted: 2020-09-21

## 2025-02-19 PROBLEM — I25.10 ATHEROSCLEROTIC HEART DISEASE OF NATIVE CORONARY ARTERY WITHOUT ANGINA PECTORIS: Chronic | Status: ACTIVE | Noted: 2020-09-21

## 2025-02-19 PROBLEM — I10 ESSENTIAL (PRIMARY) HYPERTENSION: Chronic | Status: ACTIVE | Noted: 2020-09-21

## 2025-02-19 RX ORDER — ESCITALOPRAM 10 MG/1
1 TABLET, FILM COATED ORAL
Refills: 0 | DISCHARGE

## 2025-02-19 RX ORDER — METOPROLOL SUCCINATE 25 MG
1 TABLET, EXTENDED RELEASE 24 HR ORAL
Refills: 0 | DISCHARGE

## 2025-02-19 RX ORDER — SERTRALINE HCL 100 MG
1 TABLET ORAL
Refills: 0 | DISCHARGE

## 2025-02-19 RX ORDER — METFORMIN HYDROCHLORIDE 1000 MG/1
1 TABLET, COATED ORAL
Refills: 0 | DISCHARGE

## 2025-02-19 RX ORDER — PREGABALIN CAPSULES, CV 225 MG/1
1 CAPSULE ORAL
Refills: 0 | DISCHARGE

## 2025-02-19 RX ORDER — RANOLAZINE 500 MG/1
1 TABLET, FILM COATED, EXTENDED RELEASE ORAL
Refills: 0 | DISCHARGE

## 2025-02-19 RX ORDER — SEMAGLUTIDE 0.25 MG/.5ML
1 INJECTION, SOLUTION SUBCUTANEOUS
Refills: 0 | DISCHARGE

## 2025-02-19 RX ORDER — ATORVASTATIN CALCIUM 80 MG/1
1 TABLET, FILM COATED ORAL
Refills: 0 | DISCHARGE

## 2025-02-19 RX ORDER — INSULIN GLARGINE-YFGN 100 [IU]/ML
40 INJECTION, SOLUTION SUBCUTANEOUS
Refills: 0 | DISCHARGE

## 2025-02-19 RX ORDER — ROSUVASTATIN CALCIUM 10 MG/1
1 TABLET, FILM COATED ORAL
Refills: 0 | DISCHARGE

## 2025-02-19 RX ORDER — ERTUGLIFLOZIN 15 MG/1
1 TABLET, FILM COATED ORAL
Refills: 0 | DISCHARGE

## 2025-02-19 RX ORDER — EMPAGLIFLOZIN 10 MG/1
1 TABLET, FILM COATED ORAL
Refills: 0 | DISCHARGE

## 2025-02-19 RX ORDER — ASPIRIN 81 MG/1
1 TABLET, COATED ORAL
Refills: 0 | DISCHARGE